# Patient Record
Sex: MALE | Race: WHITE | NOT HISPANIC OR LATINO | Employment: OTHER | RURAL
[De-identification: names, ages, dates, MRNs, and addresses within clinical notes are randomized per-mention and may not be internally consistent; named-entity substitution may affect disease eponyms.]

---

## 2017-05-25 ENCOUNTER — OFFICE VISIT (OUTPATIENT)
Dept: CARDIOLOGY | Facility: CLINIC | Age: 67
End: 2017-05-25

## 2017-05-25 VITALS
HEIGHT: 72 IN | BODY MASS INDEX: 33.51 KG/M2 | SYSTOLIC BLOOD PRESSURE: 120 MMHG | HEART RATE: 78 BPM | DIASTOLIC BLOOD PRESSURE: 60 MMHG | WEIGHT: 247.4 LBS

## 2017-05-25 DIAGNOSIS — I50.22 SYSTOLIC CHF, CHRONIC (HCC): ICD-10-CM

## 2017-05-25 DIAGNOSIS — I10 HTN (HYPERTENSION), BENIGN: ICD-10-CM

## 2017-05-25 DIAGNOSIS — I25.5 ISCHEMIC CARDIOMYOPATHY: Primary | ICD-10-CM

## 2017-05-25 PROCEDURE — 99213 OFFICE O/P EST LOW 20 MIN: CPT | Performed by: INTERNAL MEDICINE

## 2017-05-25 RX ORDER — LISINOPRIL 10 MG/1
10 TABLET ORAL DAILY
COMMUNITY
End: 2017-07-24 | Stop reason: SDUPTHER

## 2017-05-25 RX ORDER — ALBUTEROL SULFATE 2.5 MG/3ML
2.5 SOLUTION RESPIRATORY (INHALATION) DAILY PRN
Refills: 2 | COMMUNITY
Start: 2017-04-05

## 2017-05-25 RX ORDER — ASPIRIN 81 MG/1
81 TABLET ORAL DAILY
COMMUNITY
End: 2017-07-24 | Stop reason: SDUPTHER

## 2017-05-25 RX ORDER — HYDROCODONE BITARTRATE AND ACETAMINOPHEN 7.5; 325 MG/1; MG/1
1 TABLET ORAL DAILY PRN
Refills: 0 | COMMUNITY
Start: 2017-04-26 | End: 2021-07-22

## 2017-05-25 RX ORDER — NITROGLYCERIN 0.4 MG/1
0.4 TABLET SUBLINGUAL
COMMUNITY
End: 2017-07-24 | Stop reason: SDUPTHER

## 2017-07-24 DIAGNOSIS — I25.5 ISCHEMIC CARDIOMYOPATHY: ICD-10-CM

## 2017-07-24 DIAGNOSIS — I10 HTN (HYPERTENSION), BENIGN: ICD-10-CM

## 2017-07-24 DIAGNOSIS — E78.00 HYPERCHOLESTEREMIA: ICD-10-CM

## 2017-07-24 DIAGNOSIS — I50.22 SYSTOLIC CHF, CHRONIC (HCC): ICD-10-CM

## 2017-07-24 RX ORDER — CARVEDILOL 6.25 MG/1
6.25 TABLET ORAL 2 TIMES DAILY WITH MEALS
Qty: 60 TABLET | Refills: 11 | Status: SHIPPED | OUTPATIENT
Start: 2017-07-24 | End: 2018-02-08

## 2017-07-24 RX ORDER — ATORVASTATIN CALCIUM 10 MG/1
10 TABLET, FILM COATED ORAL NIGHTLY
Qty: 30 TABLET | Refills: 11 | Status: SHIPPED | OUTPATIENT
Start: 2017-07-24 | End: 2018-09-06 | Stop reason: SDUPTHER

## 2017-07-24 RX ORDER — ASPIRIN 81 MG/1
81 TABLET ORAL DAILY
Qty: 30 TABLET | Refills: 11 | Status: SHIPPED | OUTPATIENT
Start: 2017-07-24 | End: 2021-05-21

## 2017-07-24 RX ORDER — ISOSORBIDE DINITRATE 40 MG/1
40 TABLET ORAL DAILY
Qty: 30 TABLET | Refills: 11 | Status: SHIPPED | OUTPATIENT
Start: 2017-07-24 | End: 2018-03-22 | Stop reason: CLARIF

## 2017-07-24 RX ORDER — CLOPIDOGREL BISULFATE 75 MG/1
75 TABLET ORAL DAILY
Qty: 30 TABLET | Refills: 11 | Status: SHIPPED | OUTPATIENT
Start: 2017-07-24 | End: 2018-08-23

## 2017-07-24 RX ORDER — NITROGLYCERIN 0.4 MG/1
0.4 TABLET SUBLINGUAL
Qty: 30 TABLET | Refills: 3 | Status: SHIPPED | OUTPATIENT
Start: 2017-07-24 | End: 2020-04-21 | Stop reason: SDUPTHER

## 2017-07-24 RX ORDER — LISINOPRIL 10 MG/1
10 TABLET ORAL DAILY
Qty: 30 TABLET | Refills: 11 | Status: SHIPPED | OUTPATIENT
Start: 2017-07-24 | End: 2018-09-06 | Stop reason: SDUPTHER

## 2017-07-24 RX ORDER — FUROSEMIDE 20 MG/1
20 TABLET ORAL DAILY
Qty: 30 TABLET | Refills: 11 | Status: SHIPPED | OUTPATIENT
Start: 2017-07-24 | End: 2018-09-06 | Stop reason: SDUPTHER

## 2017-08-31 ENCOUNTER — CLINICAL SUPPORT NO REQUIREMENTS (OUTPATIENT)
Dept: CARDIOLOGY | Facility: CLINIC | Age: 67
End: 2017-08-31

## 2017-08-31 DIAGNOSIS — I25.5 ISCHEMIC CARDIOMYOPATHY: ICD-10-CM

## 2017-08-31 PROCEDURE — 93296 REM INTERROG EVL PM/IDS: CPT | Performed by: PHYSICIAN ASSISTANT

## 2017-08-31 PROCEDURE — 93295 DEV INTERROG REMOTE 1/2/MLT: CPT | Performed by: PHYSICIAN ASSISTANT

## 2018-02-08 ENCOUNTER — HOSPITAL ENCOUNTER (OUTPATIENT)
Dept: OTHER | Age: 68
Discharge: HOME | End: 2018-02-08
Attending: INTERNAL MEDICINE | Admitting: INTERNAL MEDICINE

## 2018-02-08 ENCOUNTER — OFFICE VISIT (OUTPATIENT)
Dept: CARDIOLOGY | Facility: CLINIC | Age: 68
End: 2018-02-08

## 2018-02-08 VITALS
HEART RATE: 70 BPM | WEIGHT: 245.9 LBS | HEIGHT: 72 IN | SYSTOLIC BLOOD PRESSURE: 120 MMHG | OXYGEN SATURATION: 95 % | DIASTOLIC BLOOD PRESSURE: 70 MMHG | BODY MASS INDEX: 33.31 KG/M2

## 2018-02-08 DIAGNOSIS — E78.00 HYPERCHOLESTEREMIA: ICD-10-CM

## 2018-02-08 DIAGNOSIS — I25.5 ISCHEMIC CARDIOMYOPATHY: Primary | ICD-10-CM

## 2018-02-08 DIAGNOSIS — I10 HTN (HYPERTENSION), BENIGN: ICD-10-CM

## 2018-02-08 LAB
A/G RATIO: 1.3 (ref 0.8–2)
ALBUMIN SERPL-MCNC: 4.4 G/DL (ref 3.4–4.8)
ALP BLD-CCNC: 102 U/L (ref 25–100)
ALT SERPL-CCNC: 12 U/L (ref 4–36)
ANION GAP SERPL CALCULATED.3IONS-SCNC: 12 MMOL/L (ref 3–16)
AST SERPL-CCNC: 15 U/L (ref 8–33)
BILIRUB SERPL-MCNC: 0.8 MG/DL (ref 0.3–1.2)
BUN BLDV-MCNC: 14 MG/DL (ref 6–20)
CALCIUM SERPL-MCNC: 9.3 MG/DL (ref 8.5–10.5)
CHLORIDE BLD-SCNC: 99 MMOL/L (ref 98–107)
CHOLESTEROL, TOTAL: 134 MG/DL (ref 0–200)
CO2: 26 MMOL/L (ref 20–30)
CREAT SERPL-MCNC: 1.3 MG/DL (ref 0.4–1.2)
GFR AFRICAN AMERICAN: >59
GFR NON-AFRICAN AMERICAN: 55
GLOBULIN: 3.3 G/DL
GLUCOSE BLD-MCNC: 101 MG/DL (ref 74–106)
HDLC SERPL-MCNC: 41 MG/DL (ref 40–60)
LDL CHOLESTEROL CALCULATED: 66 MG/DL
POTASSIUM SERPL-SCNC: 4.6 MMOL/L (ref 3.4–5.1)
SODIUM BLD-SCNC: 137 MMOL/L (ref 136–145)
TOTAL PROTEIN: 7.7 G/DL (ref 6.4–8.3)
TRIGL SERPL-MCNC: 133 MG/DL (ref 0–249)
VLDLC SERPL CALC-MCNC: 27 MG/DL

## 2018-02-08 PROCEDURE — 99214 OFFICE O/P EST MOD 30 MIN: CPT | Performed by: INTERNAL MEDICINE

## 2018-02-08 PROCEDURE — 93284 PRGRMG EVAL IMPLANTABLE DFB: CPT | Performed by: INTERNAL MEDICINE

## 2018-02-08 RX ORDER — MOMETASONE FUROATE AND FORMOTEROL FUMARATE DIHYDRATE 200; 5 UG/1; UG/1
2 AEROSOL RESPIRATORY (INHALATION) DAILY
Refills: 2 | COMMUNITY
Start: 2018-01-30 | End: 2019-10-10

## 2018-02-08 RX ORDER — CARVEDILOL 12.5 MG/1
12.5 TABLET ORAL 2 TIMES DAILY
Qty: 180 TABLET | Refills: 3 | Status: SHIPPED | OUTPATIENT
Start: 2018-02-08 | End: 2018-08-23 | Stop reason: DRUGHIGH

## 2018-02-08 NOTE — PROGRESS NOTES
[facility]  Office visit  Morgan Luna  1950  [phone]    VISIT DATE:  02/08/2018    PCP: Risa Jhaveri MD  [address] [place] [address] [address] [place] [address]    CC:  No chief complaint on file.      PROBLEM LIST:  1. Ischemic cardiomyopathy:  a. History of 4-vessel CABG in 1996, incomplete database.  b. History of post-CABG stenting, 2004, incomplete database.  c. Status post pacemaker defibrillator implantation, 2015, Dr. Fernandez.  2. Black lung disease/chronic obstructive pulmonary disease/ongoing tobacco.  3. Anxiety.  4. Benign hypertension.  5. Hypercholesterolemia.  6. Acid reflux.  7. Family history.  8. Surgical history:  a. Coronary artery bypass grafting.  b. Back surgery.  c. ICD implantation.    ASSESSMENT:   Diagnosis Plan   1. Ischemic cardiomyopathy     2. HTN (hypertension), benign     3. Hypercholesteremia       Device interrogation:  [employer] biventricular ICD  15% atrial pacing, sensed P-wave 3.8 mV, threshold 0.7 V at 0.5 ms, impedance 616 ohms  98% biventricular pacing.  Sensed R wave 13.3 mV, threshold 1 V at 0.5 ms, impedance 497 ohms  Sensed LV 13.7 mV, threshold 2 V at 2 ms, impedance 587 ohms  110 beat run of nonsustained ventricular tachycardia 203 bpm.    PLAN:  Coronary artery disease: Currently stable and asymptomatic.  Has been on dual antiplatelet therapy for 20 years.  Tolerating well without complications.  Discussed that we may discontinue this in the future.  Afterload is well-controlled.  Discussed titrating statin therapy as well.    Congestive heart failure, chronic, systolic: Currently euvolemic and compensated.  On Max tolerated medical therapy.  Continue routine follow-up in device clinic    Hyperlipidemia: Goal LDL less than 70.  Lipid panel pending today, nonfasting.    Nonsustained ventricular tachycardia: Titrating beta blockade.    Subjective  Reports stable functional capacity.  Denies chest pain, palpitations.  Has  stable shortness of breath and a class III pattern due to underlying cold minors pneumoconiosis.  Was able stop smoking about a year and half ago.  Has gained 30 pounds since that time.  He is compliant with medical therapy.  Blood pressures running less than 130/80 mmHg.    PHYSICAL EXAMINATION:  There were no vitals filed for this visit.  General Appearance:    Alert, cooperative, no distress, appears stated age   Head:    Normocephalic, without obvious abnormality, atraumatic   Eyes:    conjunctiva/corneas clear   Nose:   Nares normal, septum midline, mucosa normal, no drainage   Throat:   Lips, teeth and gums normal   Neck:   Supple, symmetrical, trachea midline, no carotid    bruit or JVD   Lungs:     Clear to auscultation bilaterally, respirations unlabored   Chest Wall:    No tenderness or deformity    Heart:    Regular rate and rhythm, S1 and S2 normal, no murmur, rub   or gallop, normal carotid impulse bilaterally without bruit.   Abdomen:     Soft, non-tender   Extremities:   Extremities normal, atraumatic, no cyanosis or edema   Pulses:   2+ and symmetric all extremities   Skin:   Skin color, texture, turgor normal, no rashes or lesions       Diagnostic Data:  Procedures  No results found for: CHLPL, TRIG, HDL, LDLDIRECT  No results found for: GLUCOSE, BUN, CREATININE, NA, K, CL, CO2, CREATININE, BUN  No results found for: HGBA1C  No results found for: WBC, HGB, HCT, PLT    Allergies  Allergies   Allergen Reactions   • Codeine      GI UPSET       Current Medications    Current Outpatient Prescriptions:   •  albuterol (PROVENTIL HFA;VENTOLIN HFA) 108 (90 BASE) MCG/ACT inhaler, Inhale 2 puffs every 4 (four) hours as needed for wheezing., Disp: , Rfl:   •  albuterol (PROVENTIL) (2.5 MG/3ML) 0.083% nebulizer solution, Daily As Needed., Disp: , Rfl: 2  •  aspirin 81 MG EC tablet, Take 1 tablet by mouth Daily., Disp: 30 tablet, Rfl: 11  •  atorvastatin (LIPITOR) 10 MG tablet, Take 1 tablet by mouth Every  Night., Disp: 30 tablet, Rfl: 11  •  carvedilol (COREG) 6.25 MG tablet, Take 1 tablet by mouth 2 (Two) Times a Day With Meals., Disp: 60 tablet, Rfl: 11  •  clopidogrel (PLAVIX) 75 MG tablet, Take 1 tablet by mouth Daily., Disp: 30 tablet, Rfl: 11  •  diazepam (VALIUM) 5 MG tablet, Take 5 mg by mouth Daily., Disp: , Rfl:   •  furosemide (LASIX) 20 MG tablet, Take 1 tablet by mouth Daily., Disp: 30 tablet, Rfl: 11  •  HYDROcodone-acetaminophen (NORCO) 7.5-325 MG per tablet, Daily As Needed., Disp: , Rfl: 0  •  isosorbide dinitrate (ISORDIL) 40 MG tablet, Take 1 tablet by mouth Daily., Disp: 30 tablet, Rfl: 11  •  lisinopril (PRINIVIL,ZESTRIL) 10 MG tablet, Take 1 tablet by mouth Daily., Disp: 30 tablet, Rfl: 11  •  Mometasone Furo-Formoterol Fum (DULERA IN), Inhale as needed., Disp: , Rfl:   •  nitroglycerin (NITROSTAT) 0.4 MG SL tablet, Place 1 tablet under the tongue Every 5 (Five) Minutes As Needed for Chest Pain. Take no more than 3 doses in 15 minutes., Disp: 30 tablet, Rfl: 3  •  O2 (OXYGEN), Inhale 2.5 L/min Daily. Pt uses oxygen at home only, Disp: , Rfl:   •  ranitidine (ZANTAC) 150 MG tablet, Take 150 mg by mouth 2 (two) times a day., Disp: , Rfl:   •  tamsulosin (FLOMAX) 0.4 MG capsule 24 hr capsule, Take 1 capsule by mouth Every Night., Disp: , Rfl:   •  tiotropium (SPIRIVA) 18 MCG per inhalation capsule, Place 1 capsule into inhaler and inhale 1 (one) time daily., Disp: , Rfl:           ROS  Review of Systems   Cardiovascular: Positive for irregular heartbeat and orthopnea.   Respiratory: Positive for shortness of breath, sleep disturbances due to breathing and snoring.    Neurological: Positive for dizziness.       SOCIAL HX  Social History     Social History   • Marital status:      Spouse name: N/A   • Number of children: N/A   • Years of education: N/A     Occupational History   • Not on file.     Social History Main Topics   • Smoking status: Former Smoker     Packs/day: 1.00     Quit date:  11/2016   • Smokeless tobacco: Never Used   • Alcohol use No   • Drug use: No   • Sexual activity: Defer     Other Topics Concern   • Not on file     Social History Narrative       FAMILY HX  No family history on file.          Vinayak Felix III, MD, FACC

## 2018-03-22 RX ORDER — ISOSORBIDE DINITRATE 20 MG/1
TABLET ORAL
Qty: 60 TABLET | Refills: 5 | Status: SHIPPED | OUTPATIENT
Start: 2018-03-22 | End: 2018-09-06 | Stop reason: SDUPTHER

## 2018-03-22 NOTE — TELEPHONE ENCOUNTER
Pharmacy called stating that isosorbide 40 mg will not be made anymore. Had to change to 20mg 2 daily

## 2018-05-10 ENCOUNTER — CLINICAL SUPPORT NO REQUIREMENTS (OUTPATIENT)
Dept: CARDIOLOGY | Facility: CLINIC | Age: 68
End: 2018-05-10

## 2018-05-10 DIAGNOSIS — I25.5 ISCHEMIC CARDIOMYOPATHY: ICD-10-CM

## 2018-05-10 PROCEDURE — 93296 REM INTERROG EVL PM/IDS: CPT | Performed by: INTERNAL MEDICINE

## 2018-05-10 PROCEDURE — 93295 DEV INTERROG REMOTE 1/2/MLT: CPT | Performed by: INTERNAL MEDICINE

## 2018-08-23 ENCOUNTER — OFFICE VISIT (OUTPATIENT)
Dept: CARDIOLOGY | Facility: CLINIC | Age: 68
End: 2018-08-23

## 2018-08-23 VITALS — HEIGHT: 72 IN | BODY MASS INDEX: 33.46 KG/M2 | HEART RATE: 71 BPM | WEIGHT: 247 LBS

## 2018-08-23 DIAGNOSIS — E78.00 HYPERCHOLESTEREMIA: ICD-10-CM

## 2018-08-23 DIAGNOSIS — I25.10 CORONARY ARTERY DISEASE INVOLVING NATIVE CORONARY ARTERY OF NATIVE HEART WITHOUT ANGINA PECTORIS: ICD-10-CM

## 2018-08-23 DIAGNOSIS — I10 HTN (HYPERTENSION), BENIGN: ICD-10-CM

## 2018-08-23 DIAGNOSIS — I50.22 SYSTOLIC CHF, CHRONIC (HCC): Primary | ICD-10-CM

## 2018-08-23 PROCEDURE — 93284 PRGRMG EVAL IMPLANTABLE DFB: CPT | Performed by: INTERNAL MEDICINE

## 2018-08-23 PROCEDURE — 99214 OFFICE O/P EST MOD 30 MIN: CPT | Performed by: INTERNAL MEDICINE

## 2018-08-23 RX ORDER — CARVEDILOL 6.25 MG/1
6.25 TABLET ORAL 2 TIMES DAILY WITH MEALS
COMMUNITY
End: 2018-09-06 | Stop reason: SDUPTHER

## 2018-08-23 NOTE — PROGRESS NOTES
[facility]  Office visit  Morgan Luna  1950  [phone]    VISIT DATE:  02/08/2018    PCP: Risa Morales MD  [address] [place] [address] [address] [place] [address]    CC:  Chief Complaint   Patient presents with   • Cardiomyopathy   • Shortness of Breath       PROBLEM LIST:  1. Ischemic cardiomyopathy:  a. History of 4-vessel CABG in 1996, incomplete database.  b. History of post-CABG stenting, 2004, incomplete database.  c. Status post pacemaker defibrillator implantation, 2015, Dr. Fernandez.  2. Black lung disease/chronic obstructive pulmonary disease/ongoing tobacco.  3. Anxiety.  4. Benign hypertension.  5. Hypercholesterolemia.  6. Acid reflux.  7. Family history.  8. Surgical history:  a. Coronary artery bypass grafting.  b. Back surgery.  c. ICD implantation.    ASSESSMENT:   Diagnosis Plan   1. Systolic CHF, chronic (CMS/HCC)     2. Hypercholesteremia     3. HTN (hypertension), benign     4. Coronary artery disease involving native coronary artery of native heart without angina pectoris       Device interrogation:  [employer] biventricular ICD  11% atrial pacing, sensed P-wave 3.4 mV, threshold 0.8V at 0.5 ms, impedance 593 ohms  99 % biventricular pacing.  Sensed R wave 11 mV, threshold 0.9 V at 0.5 ms, impedance 485 ohms  Sensed LV 13.1 mV, threshold 2 V at 2 ms, impedance 570 ohms  No significant arrhythmia  Estimated battery life 5 years    PLAN:  Coronary artery disease: Currently stable and asymptomatic.  Discontinuing dual and a platelet therapy, stopped clopidogrel.  Afterload is well-controlled.  Continue statin    Congestive heart failure, chronic, systolic: Currently euvolemic and compensated.  On Max tolerated medical therapy.  Continue routine follow-up in device clinic    Hyperlipidemia: Goal LDL less than 70.  Currently well controlled. Continue current medical therapy    Nonsustained ventricular tachycardia: Continue  "beta-blockade    Subjective  Reports stable functional capacity.  Denies chest pain, palpitations.  Has stable shortness of breath and a class III pattern due to underlying 's pneumoconiosis.  Was able stop smoking.  He is compliant with medical therapy.  Blood pressures running less than 130/80 mmHg.    PHYSICAL EXAMINATION:  Vitals:    08/23/18 1104   Pulse: 71   Weight: 112 kg (247 lb)   Height: 182.9 cm (72\")     General Appearance:    Alert, cooperative, no distress, appears stated age   Head:    Normocephalic, without obvious abnormality, atraumatic   Eyes:    conjunctiva/corneas clear   Nose:   Nares normal, septum midline, mucosa normal, no drainage   Throat:   Lips, teeth and gums normal   Neck:   Supple, symmetrical, trachea midline, no carotid    bruit or JVD   Lungs:     Clear to auscultation bilaterally, respirations unlabored   Chest Wall:    No tenderness or deformity    Heart:    Regular rate and rhythm, S1 and S2 normal, no murmur, rub   or gallop, normal carotid impulse bilaterally without bruit.   Abdomen:     Soft, non-tender   Extremities:   Extremities normal, atraumatic, no cyanosis or edema   Pulses:   2+ and symmetric all extremities   Skin:   Skin color, texture, turgor normal, no rashes or lesions       Diagnostic Data:  Procedures  No results found for: CHLPL, TRIG, HDL, LDLDIRECT  No results found for: GLUCOSE, BUN, CREATININE, NA, K, CL, CO2, CREATININE, BUN  No results found for: HGBA1C  No results found for: WBC, HGB, HCT, PLT    Allergies  Allergies   Allergen Reactions   • Codeine      GI UPSET       Current Medications    Current Outpatient Prescriptions:   •  albuterol (PROVENTIL HFA;VENTOLIN HFA) 108 (90 BASE) MCG/ACT inhaler, Inhale 2 puffs every 4 (four) hours as needed for wheezing., Disp: , Rfl:   •  albuterol (PROVENTIL) (2.5 MG/3ML) 0.083% nebulizer solution, Daily As Needed., Disp: , Rfl: 2  •  ANORO ELLIPTA 62.5-25 MCG/INH aerosol powder  inhaler, Inhale 1 puff " Daily., Disp: , Rfl: 0  •  aspirin 81 MG EC tablet, Take 1 tablet by mouth Daily., Disp: 30 tablet, Rfl: 11  •  atorvastatin (LIPITOR) 10 MG tablet, Take 1 tablet by mouth Every Night., Disp: 30 tablet, Rfl: 11  •  carvedilol (COREG) 6.25 MG tablet, Take 6.25 mg by mouth 2 (Two) Times a Day With Meals., Disp: , Rfl:   •  clopidogrel (PLAVIX) 75 MG tablet, Take 1 tablet by mouth Daily., Disp: 30 tablet, Rfl: 11  •  diazepam (VALIUM) 5 MG tablet, Take 5 mg by mouth Daily., Disp: , Rfl:   •  DULERA 200-5 MCG/ACT inhaler, Inhale 2 puffs Daily., Disp: , Rfl: 2  •  furosemide (LASIX) 20 MG tablet, Take 1 tablet by mouth Daily., Disp: 30 tablet, Rfl: 11  •  HYDROcodone-acetaminophen (NORCO) 7.5-325 MG per tablet, Take 1 tablet by mouth Daily As Needed., Disp: , Rfl: 0  •  isosorbide dinitrate (ISORDIL) 20 MG tablet, TAKE 2 TABS DAILY, Disp: 60 tablet, Rfl: 5  •  lisinopril (PRINIVIL,ZESTRIL) 10 MG tablet, Take 1 tablet by mouth Daily., Disp: 30 tablet, Rfl: 11  •  nitroglycerin (NITROSTAT) 0.4 MG SL tablet, Place 1 tablet under the tongue Every 5 (Five) Minutes As Needed for Chest Pain. Take no more than 3 doses in 15 minutes., Disp: 30 tablet, Rfl: 3  •  O2 (OXYGEN), Inhale 2.5 L/min Daily. Pt uses oxygen at home only, Disp: , Rfl:   •  ranitidine (ZANTAC) 150 MG tablet, Take 150 mg by mouth 2 (two) times a day., Disp: , Rfl:   •  tamsulosin (FLOMAX) 0.4 MG capsule 24 hr capsule, Take 1 capsule by mouth Every Night., Disp: , Rfl:           ROS  Review of Systems   Cardiovascular: Positive for dyspnea on exertion and orthopnea. Negative for irregular heartbeat.   Respiratory: Positive for shortness of breath, sleep disturbances due to breathing, snoring, sputum production and wheezing.    Neurological: Positive for dizziness.       SOCIAL HX  Social History     Social History   • Marital status:      Spouse name: N/A   • Number of children: N/A   • Years of education: N/A     Occupational History   • Not on file.      Social History Main Topics   • Smoking status: Former Smoker     Packs/day: 1.00     Quit date: 11/2016   • Smokeless tobacco: Never Used   • Alcohol use No   • Drug use: No   • Sexual activity: Defer     Other Topics Concern   • Not on file     Social History Narrative   • No narrative on file       FAMILY HX  No family history on file.          Vinayak Felix III, MD, FACC

## 2018-09-06 DIAGNOSIS — I10 HTN (HYPERTENSION), BENIGN: ICD-10-CM

## 2018-09-06 DIAGNOSIS — E78.00 HYPERCHOLESTEREMIA: ICD-10-CM

## 2018-09-06 DIAGNOSIS — I25.5 ISCHEMIC CARDIOMYOPATHY: ICD-10-CM

## 2018-09-06 DIAGNOSIS — I50.22 SYSTOLIC CHF, CHRONIC (HCC): ICD-10-CM

## 2018-09-06 RX ORDER — FUROSEMIDE 20 MG/1
20 TABLET ORAL DAILY
Qty: 30 TABLET | Refills: 5 | Status: SHIPPED | OUTPATIENT
Start: 2018-09-06 | End: 2019-03-19 | Stop reason: SDUPTHER

## 2018-09-06 RX ORDER — ATORVASTATIN CALCIUM 10 MG/1
10 TABLET, FILM COATED ORAL NIGHTLY
Qty: 30 TABLET | Refills: 5 | Status: SHIPPED | OUTPATIENT
Start: 2018-09-06 | End: 2019-03-19 | Stop reason: SDUPTHER

## 2018-09-06 RX ORDER — CARVEDILOL 6.25 MG/1
6.25 TABLET ORAL 2 TIMES DAILY WITH MEALS
Qty: 60 TABLET | Refills: 5 | Status: SHIPPED | OUTPATIENT
Start: 2018-09-06 | End: 2019-03-19 | Stop reason: SDUPTHER

## 2018-09-06 RX ORDER — ISOSORBIDE DINITRATE 20 MG/1
TABLET ORAL
Qty: 60 TABLET | Refills: 5 | Status: SHIPPED | OUTPATIENT
Start: 2018-09-06 | End: 2019-03-19 | Stop reason: SDUPTHER

## 2018-09-06 RX ORDER — LISINOPRIL 10 MG/1
10 TABLET ORAL DAILY
Qty: 30 TABLET | Refills: 5 | Status: SHIPPED | OUTPATIENT
Start: 2018-09-06 | End: 2019-03-19 | Stop reason: SDUPTHER

## 2018-10-25 ENCOUNTER — CLINICAL SUPPORT NO REQUIREMENTS (OUTPATIENT)
Dept: CARDIOLOGY | Facility: CLINIC | Age: 68
End: 2018-10-25

## 2018-10-25 DIAGNOSIS — I25.5 ISCHEMIC CARDIOMYOPATHY: ICD-10-CM

## 2018-10-25 PROCEDURE — 93295 DEV INTERROG REMOTE 1/2/MLT: CPT | Performed by: INTERNAL MEDICINE

## 2018-10-25 PROCEDURE — 93296 REM INTERROG EVL PM/IDS: CPT | Performed by: INTERNAL MEDICINE

## 2019-01-24 ENCOUNTER — CLINICAL SUPPORT NO REQUIREMENTS (OUTPATIENT)
Dept: CARDIOLOGY | Facility: CLINIC | Age: 69
End: 2019-01-24

## 2019-01-24 DIAGNOSIS — I25.5 ISCHEMIC CARDIOMYOPATHY: ICD-10-CM

## 2019-01-24 PROCEDURE — 93296 REM INTERROG EVL PM/IDS: CPT | Performed by: INTERNAL MEDICINE

## 2019-01-24 PROCEDURE — 93295 DEV INTERROG REMOTE 1/2/MLT: CPT | Performed by: INTERNAL MEDICINE

## 2019-03-19 ENCOUNTER — OFFICE VISIT (OUTPATIENT)
Dept: CARDIOLOGY | Facility: CLINIC | Age: 69
End: 2019-03-19

## 2019-03-19 VITALS
HEIGHT: 72 IN | OXYGEN SATURATION: 94 % | HEART RATE: 62 BPM | DIASTOLIC BLOOD PRESSURE: 70 MMHG | WEIGHT: 250.8 LBS | BODY MASS INDEX: 33.97 KG/M2 | SYSTOLIC BLOOD PRESSURE: 116 MMHG

## 2019-03-19 DIAGNOSIS — I10 HTN (HYPERTENSION), BENIGN: ICD-10-CM

## 2019-03-19 DIAGNOSIS — E78.00 HYPERCHOLESTEREMIA: ICD-10-CM

## 2019-03-19 DIAGNOSIS — I50.22 SYSTOLIC CHF, CHRONIC (HCC): Primary | ICD-10-CM

## 2019-03-19 DIAGNOSIS — I25.10 CORONARY ARTERY DISEASE INVOLVING NATIVE CORONARY ARTERY OF NATIVE HEART WITHOUT ANGINA PECTORIS: ICD-10-CM

## 2019-03-19 DIAGNOSIS — I25.5 ISCHEMIC CARDIOMYOPATHY: ICD-10-CM

## 2019-03-19 PROCEDURE — 99214 OFFICE O/P EST MOD 30 MIN: CPT | Performed by: INTERNAL MEDICINE

## 2019-03-19 RX ORDER — CARVEDILOL 6.25 MG/1
6.25 TABLET ORAL 2 TIMES DAILY WITH MEALS
Qty: 180 TABLET | Refills: 1 | Status: SHIPPED | OUTPATIENT
Start: 2019-03-19 | End: 2019-10-10

## 2019-03-19 RX ORDER — AMOXICILLIN AND CLAVULANATE POTASSIUM 875; 125 MG/1; MG/1
TABLET, FILM COATED ORAL
Refills: 0 | COMMUNITY
Start: 2019-03-14 | End: 2019-10-10

## 2019-03-19 RX ORDER — ISOSORBIDE DINITRATE 20 MG/1
TABLET ORAL
Qty: 180 TABLET | Refills: 1 | Status: SHIPPED | OUTPATIENT
Start: 2019-03-19 | End: 2019-11-04 | Stop reason: SDUPTHER

## 2019-03-19 RX ORDER — LISINOPRIL 10 MG/1
10 TABLET ORAL DAILY
Qty: 90 TABLET | Refills: 1 | Status: SHIPPED | OUTPATIENT
Start: 2019-03-19 | End: 2019-10-10

## 2019-03-19 RX ORDER — FUROSEMIDE 20 MG/1
20 TABLET ORAL DAILY
Qty: 90 TABLET | Refills: 1 | Status: SHIPPED | OUTPATIENT
Start: 2019-03-19 | End: 2020-04-21 | Stop reason: SDUPTHER

## 2019-03-19 RX ORDER — ATORVASTATIN CALCIUM 10 MG/1
10 TABLET, FILM COATED ORAL NIGHTLY
Qty: 90 TABLET | Refills: 1 | Status: SHIPPED | OUTPATIENT
Start: 2019-03-19 | End: 2019-11-04 | Stop reason: SDUPTHER

## 2019-03-19 NOTE — PROGRESS NOTES
[facility]  Office visit  Morgan Luna  1950  [phone]    VISIT DATE:  02/08/2018    PCP: Nini Jhaveri, Risa Staton MD  [address] [place] [address] [address] [place] [address]    CC:  Chief Complaint   Patient presents with   • Coronary Artery Disease   • Systolic CHF, chronic       PROBLEM LIST:  1. Ischemic cardiomyopathy:  a. History of 4-vessel CABG in 1996, incomplete database.  b. History of post-CABG stenting, 2004, incomplete database.  c. Status post pacemaker defibrillator implantation, 2015, Dr. Fernandez.  2. Black lung disease/chronic obstructive pulmonary disease/ongoing tobacco.  3. Anxiety.  4. Benign hypertension.  5. Hypercholesterolemia.  6. Acid reflux.  7. Family history.  8. Surgical history:  a. Coronary artery bypass grafting.  b. Back surgery.  c. ICD implantation.    ASSESSMENT:   Diagnosis Plan   1. Systolic CHF, chronic (CMS/HCC)     2. Coronary artery disease involving native coronary artery of native heart without angina pectoris     3. HTN (hypertension), benign     4. Hypercholesteremia       Device interrogation:  [employer] biventricular ICD  9 % atrial pacing, sensed P-wave 2.7 mV, threshold 0.8V at 0.5 ms, impedance 616 ohms  99 % biventricular pacing.  Sensed R wave 14.1 mV, threshold 0.9 V at 0.5 ms, impedance 490 ohms  Sensed LV 12.2 mV, threshold 1.8 V at 2 ms, impedance 605 ohms  2 mode switches less than 1 second in duration, 2 nonsustained ventricular tachycardia episodes, 3-8 beats in duration.  Estimated battery life 4.5 years    PLAN:  Coronary artery disease: Currently stable and asymptomatic.  Afterload is well-controlled.  Continue statin and low-dose aspirin    Congestive heart failure, chronic, systolic: Currently euvolemic and compensated.  On Max tolerated medical therapy.  Continue routine follow-up in device clinic.  Echo pending in setting of recent mild congestive heart failure exacerbation.    Hyperlipidemia: Goal LDL less than  70.  Currently well controlled. Continue current medical therapy    Nonsustained ventricular tachycardia: Continue beta-blockade    Subjective  Recent episode of worsening dyspnea and fluid retention after upper respiratory tract infection requiring a brief course of increase Lasix.  Was noted to have a change in baseline renal function, previous creatinine 1.3-1.5, most recently 1.9.  Has follow-up with nephrology..  Denies chest pain, palpitations.  Has stable shortness of breath and a class III pattern due to underlying 's pneumoconiosis.  Was able stop smoking.  He is compliant with medical therapy.  Compliant with supplemental oxygen as well.  Blood pressures running less than 130/80 mmHg.    PHYSICAL EXAMINATION:  There were no vitals filed for this visit.  General Appearance:    Alert, cooperative, no distress, appears stated age   Head:    Normocephalic, without obvious abnormality, atraumatic   Eyes:    conjunctiva/corneas clear   Nose:   Nares normal, septum midline, mucosa normal, no drainage   Throat:   Lips, teeth and gums normal   Neck:   Supple, symmetrical, trachea midline, no carotid    bruit or JVD   Lungs:     Clear to auscultation bilaterally, respirations unlabored   Chest Wall:    No tenderness or deformity    Heart:    Regular rate and rhythm, S1 and S2 normal, no murmur, rub   or gallop, normal carotid impulse bilaterally without bruit.   Abdomen:     Soft, non-tender   Extremities:   Extremities normal, atraumatic, no cyanosis or edema   Pulses:   2+ and symmetric all extremities   Skin:   Skin color, texture, turgor normal, no rashes or lesions       Diagnostic Data:  Procedures  No results found for: CHLPL, TRIG, HDL, LDLDIRECT  No results found for: GLUCOSE, BUN, CREATININE, NA, K, CL, CO2, CREATININE, BUN  No results found for: HGBA1C  No results found for: WBC, HGB, HCT, PLT    Allergies  Allergies   Allergen Reactions   • Codeine      GI UPSET       Current  Medications    Current Outpatient Medications:   •  albuterol (PROVENTIL HFA;VENTOLIN HFA) 108 (90 BASE) MCG/ACT inhaler, Inhale 2 puffs every 4 (four) hours as needed for wheezing., Disp: , Rfl:   •  albuterol (PROVENTIL) (2.5 MG/3ML) 0.083% nebulizer solution, Daily As Needed., Disp: , Rfl: 2  •  ANORO ELLIPTA 62.5-25 MCG/INH aerosol powder  inhaler, Inhale 1 puff Daily., Disp: , Rfl: 0  •  aspirin 81 MG EC tablet, Take 1 tablet by mouth Daily., Disp: 30 tablet, Rfl: 11  •  atorvastatin (LIPITOR) 10 MG tablet, Take 1 tablet by mouth Every Night., Disp: 30 tablet, Rfl: 5  •  carvedilol (COREG) 6.25 MG tablet, Take 1 tablet by mouth 2 (Two) Times a Day With Meals., Disp: 60 tablet, Rfl: 5  •  diazepam (VALIUM) 5 MG tablet, Take 5 mg by mouth Daily., Disp: , Rfl:   •  DULERA 200-5 MCG/ACT inhaler, Inhale 2 puffs Daily., Disp: , Rfl: 2  •  furosemide (LASIX) 20 MG tablet, Take 1 tablet by mouth Daily., Disp: 30 tablet, Rfl: 5  •  HYDROcodone-acetaminophen (NORCO) 7.5-325 MG per tablet, Take 1 tablet by mouth Daily As Needed., Disp: , Rfl: 0  •  isosorbide dinitrate (ISORDIL) 20 MG tablet, TAKE 2 TABS DAILY, Disp: 60 tablet, Rfl: 5  •  lisinopril (PRINIVIL,ZESTRIL) 10 MG tablet, Take 1 tablet by mouth Daily., Disp: 30 tablet, Rfl: 5  •  nitroglycerin (NITROSTAT) 0.4 MG SL tablet, Place 1 tablet under the tongue Every 5 (Five) Minutes As Needed for Chest Pain. Take no more than 3 doses in 15 minutes., Disp: 30 tablet, Rfl: 3  •  O2 (OXYGEN), Inhale 2.5 L/min Daily. Pt uses oxygen at home only, Disp: , Rfl:   •  ranitidine (ZANTAC) 150 MG tablet, Take 150 mg by mouth 2 (two) times a day., Disp: , Rfl:   •  tamsulosin (FLOMAX) 0.4 MG capsule 24 hr capsule, Take 1 capsule by mouth Every Night., Disp: , Rfl:           ROS  Review of Systems   Cardiovascular: Positive for orthopnea. Negative for irregular heartbeat.   Respiratory: Positive for shortness of breath, sleep disturbances due to breathing, snoring and sputum  production. Negative for wheezing.    Neurological: Positive for dizziness.       SOCIAL HX  Social History     Socioeconomic History   • Marital status:      Spouse name: Not on file   • Number of children: Not on file   • Years of education: Not on file   • Highest education level: Not on file   Social Needs   • Financial resource strain: Not on file   • Food insecurity - worry: Not on file   • Food insecurity - inability: Not on file   • Transportation needs - medical: Not on file   • Transportation needs - non-medical: Not on file   Occupational History   • Not on file   Tobacco Use   • Smoking status: Former Smoker     Packs/day: 1.00     Last attempt to quit: 2016     Years since quittin.3   • Smokeless tobacco: Never Used   Substance and Sexual Activity   • Alcohol use: No   • Drug use: No   • Sexual activity: Defer   Other Topics Concern   • Not on file   Social History Narrative   • Not on file       FAMILY HX  No family history on file.          Vinayak Felix III, MD, FACC

## 2019-03-25 ENCOUNTER — HOSPITAL ENCOUNTER (OUTPATIENT)
Dept: NON INVASIVE DIAGNOSTICS | Facility: HOSPITAL | Age: 69
Discharge: HOME | End: 2019-03-25
Payer: MEDICARE

## 2019-03-25 ENCOUNTER — OUTSIDE FACILITY SERVICE (OUTPATIENT)
Dept: CARDIOLOGY | Facility: CLINIC | Age: 69
End: 2019-03-25

## 2019-03-25 PROCEDURE — 93306 TTE W/DOPPLER COMPLETE: CPT | Performed by: INTERNAL MEDICINE

## 2019-03-25 PROCEDURE — 93306 TTE W/DOPPLER COMPLETE: CPT

## 2019-04-25 ENCOUNTER — CLINICAL SUPPORT NO REQUIREMENTS (OUTPATIENT)
Dept: CARDIOLOGY | Facility: CLINIC | Age: 69
End: 2019-04-25

## 2019-04-25 DIAGNOSIS — I25.5 ISCHEMIC CARDIOMYOPATHY: ICD-10-CM

## 2019-04-25 PROCEDURE — 93295 DEV INTERROG REMOTE 1/2/MLT: CPT | Performed by: INTERNAL MEDICINE

## 2019-04-25 PROCEDURE — 93296 REM INTERROG EVL PM/IDS: CPT | Performed by: INTERNAL MEDICINE

## 2019-07-25 ENCOUNTER — CLINICAL SUPPORT NO REQUIREMENTS (OUTPATIENT)
Dept: CARDIOLOGY | Facility: CLINIC | Age: 69
End: 2019-07-25

## 2019-07-25 DIAGNOSIS — I50.22 SYSTOLIC CHF, CHRONIC (HCC): ICD-10-CM

## 2019-07-25 DIAGNOSIS — I25.5 ISCHEMIC CARDIOMYOPATHY: Primary | ICD-10-CM

## 2019-07-25 PROCEDURE — 93295 DEV INTERROG REMOTE 1/2/MLT: CPT | Performed by: INTERNAL MEDICINE

## 2019-07-25 PROCEDURE — 93296 REM INTERROG EVL PM/IDS: CPT | Performed by: INTERNAL MEDICINE

## 2019-10-10 ENCOUNTER — OFFICE VISIT (OUTPATIENT)
Dept: CARDIOLOGY | Facility: CLINIC | Age: 69
End: 2019-10-10

## 2019-10-10 VITALS
OXYGEN SATURATION: 96 % | HEART RATE: 76 BPM | SYSTOLIC BLOOD PRESSURE: 118 MMHG | HEIGHT: 72 IN | WEIGHT: 248 LBS | BODY MASS INDEX: 33.59 KG/M2 | DIASTOLIC BLOOD PRESSURE: 64 MMHG

## 2019-10-10 DIAGNOSIS — I25.5 ISCHEMIC CARDIOMYOPATHY: Primary | ICD-10-CM

## 2019-10-10 DIAGNOSIS — E78.00 HYPERCHOLESTEREMIA: ICD-10-CM

## 2019-10-10 DIAGNOSIS — I10 HTN (HYPERTENSION), BENIGN: ICD-10-CM

## 2019-10-10 PROCEDURE — 99214 OFFICE O/P EST MOD 30 MIN: CPT | Performed by: INTERNAL MEDICINE

## 2019-10-10 PROCEDURE — 93284 PRGRMG EVAL IMPLANTABLE DFB: CPT | Performed by: INTERNAL MEDICINE

## 2019-10-10 RX ORDER — METOPROLOL SUCCINATE 25 MG/1
25 TABLET, EXTENDED RELEASE ORAL DAILY
Qty: 30 TABLET | Refills: 11 | Status: SHIPPED | OUTPATIENT
Start: 2019-10-10 | End: 2020-04-21 | Stop reason: SDUPTHER

## 2019-10-10 NOTE — PROGRESS NOTES
[facility]  Office visit  Morgan Luna  1950  [phone]    VISIT DATE:  10/10/2019      PCP: Nini Jhaveri, Risa Staton MD  [address] [place] [address] [address] [place] [address]    CC:  Chief Complaint   Patient presents with   • systolic CHF chronic   • Cardiomyopathy       PROBLEM LIST:  1. Ischemic cardiomyopathy:  a. History of 4-vessel CABG in 1996, incomplete database.  b. History of post-CABG stenting, 2004, incomplete database.  c. Status post pacemaker defibrillator implantation, 2015, Dr. Fernandez.  2. Black lung disease/chronic obstructive pulmonary disease/ongoing tobacco.  3. Anxiety.  4. Benign hypertension.  5. Hypercholesterolemia.  6. Acid reflux.  7. Family history.  8. Surgical history:  a. Coronary artery bypass grafting.  b. Back surgery.  c. ICD implantation.    March 2019 echo   Endocardial definition too poor to make an accurate estimation of LVEF,   would require IV echocontrast or alternative imaging modality.   Moderately increased left ventricular cavity size.   There is mild concentric left ventricular hypertrophy.   Diastolic filling parameters suggests grade I diastolic dysfunction .    ASSESSMENT:   Diagnosis Plan   1. Ischemic cardiomyopathy     2. HTN (hypertension), benign     3. Hypercholesteremia       Device interrogation:  [place] [employer] biventricular ICD  15 % atrial pacing, sensed P-wave 5.2 mV, threshold 0.6V at 0.5 ms, impedance 624 ohms  99 % biventricular pacing.  Sensed R wave 18.2 mV, threshold 0.9 V at 0.5 ms, impedance 515 ohms  Sensed LV 15 mV, threshold 1.8 V at 2 ms, impedance 630 ohms  2 episodes of nonsustained ventricular tachycardia, less than 8 seconds., did not require antitachycardia pacing.  One episode of transient rate controlled atrial flutter.    Estimated battery life 4 years    PLAN:  Coronary artery disease: Currently stable and asymptomatic.  Afterload is well-controlled.  Continue statin and low-dose aspirin    Congestive  "heart failure, chronic, systolic: Currently euvolemic and compensated.  On Max tolerated medical therapy.  Continue routine follow-up in device clinic.      Hyperlipidemia: Goal LDL less than 70.  Currently well controlled. Continue current medical therapy    Nonsustained ventricular tachycardia: Continue beta-blockade, transitioning to metoprolol succinate 25 mg p.o. Daily.    Paroxysmal atrial flutter: Minimal burden of arrhythmia, not recommending anticoagulation or antiarrhythmic therapy at this time.  Currently asymptomatic.    Subjective  Most of his limitations are related to underlying black lung, currently on 3 L nasal cannula oxygen.  Following with nephrology routinely, recent medication changes made by nephrology include switching carvedilol to metoprolol tartrate, and discontinuation of Imdur and lisinopril in order to improve renal perfusion.  He is compliant with medical therapy.  Compliant with supplemental oxygen as well.  Blood pressures running less than 130/80 mmHg.    PHYSICAL EXAMINATION:  Vitals:    10/10/19 1020   BP: 118/64   BP Location: Left arm   Patient Position: Sitting   Pulse: 76   SpO2: 96%   Weight: 112 kg (248 lb)   Height: 182.9 cm (72\")     General Appearance:    Alert, cooperative, no distress, appears stated age   Head:    Normocephalic, without obvious abnormality, atraumatic   Eyes:    conjunctiva/corneas clear   Nose:   Nares normal, septum midline, mucosa normal, no drainage   Throat:   Lips, teeth and gums normal   Neck:   Supple, symmetrical, trachea midline, no carotid    bruit or JVD   Lungs:     Clear to auscultation bilaterally, respirations unlabored   Chest Wall:    No tenderness or deformity    Heart:    Regular rate and rhythm, S1 and S2 normal, no murmur, rub   or gallop, normal carotid impulse bilaterally without bruit.   Abdomen:     Soft, non-tender   Extremities:   Extremities normal, atraumatic, no cyanosis or edema   Pulses:   2+ and symmetric all " extremities   Skin:   Skin color, texture, turgor normal, no rashes or lesions       Diagnostic Data:  Procedures  No results found for: CHLPL, TRIG, HDL, LDLDIRECT  No results found for: GLUCOSE, BUN, CREATININE, NA, K, CL, CO2, CREATININE, BUN  No results found for: HGBA1C  No results found for: WBC, HGB, HCT, PLT    Allergies  Allergies   Allergen Reactions   • Codeine      GI UPSET       Current Medications    Current Outpatient Medications:   •  albuterol (PROVENTIL HFA;VENTOLIN HFA) 108 (90 BASE) MCG/ACT inhaler, Inhale 2 puffs every 4 (four) hours as needed for wheezing., Disp: , Rfl:   •  albuterol (PROVENTIL) (2.5 MG/3ML) 0.083% nebulizer solution, Take 2.5 mg by nebulization Daily As Needed., Disp: , Rfl: 2  •  ANORO ELLIPTA 62.5-25 MCG/INH aerosol powder  inhaler, Inhale 1 puff Daily., Disp: , Rfl: 0  •  aspirin 81 MG EC tablet, Take 1 tablet by mouth Daily., Disp: 30 tablet, Rfl: 11  •  atorvastatin (LIPITOR) 10 MG tablet, Take 1 tablet by mouth Every Night., Disp: 90 tablet, Rfl: 1  •  diazepam (VALIUM) 5 MG tablet, Take 5 mg by mouth Daily., Disp: , Rfl:   •  furosemide (LASIX) 20 MG tablet, Take 1 tablet by mouth Daily., Disp: 90 tablet, Rfl: 1  •  HYDROcodone-acetaminophen (NORCO) 7.5-325 MG per tablet, Take 1 tablet by mouth Daily As Needed., Disp: , Rfl: 0  •  isosorbide dinitrate (ISORDIL) 20 MG tablet, TAKE 2 TABS DAILY, Disp: 180 tablet, Rfl: 1  •  metoprolol tartrate (LOPRESSOR) 25 MG tablet, Take 25 mg by mouth 2 (Two) Times a Day., Disp: , Rfl:   •  nitroglycerin (NITROSTAT) 0.4 MG SL tablet, Place 1 tablet under the tongue Every 5 (Five) Minutes As Needed for Chest Pain. Take no more than 3 doses in 15 minutes., Disp: 30 tablet, Rfl: 3  •  O2 (OXYGEN), Inhale 3 L/min Daily. Pt uses oxygen at home only , Disp: , Rfl:   •  ranitidine (ZANTAC) 150 MG tablet, Take 150 mg by mouth 2 (two) times a day., Disp: , Rfl:   •  tamsulosin (FLOMAX) 0.4 MG capsule 24 hr capsule, Take 1 capsule by mouth  Every Night., Disp: , Rfl:           ROS  Review of Systems   Cardiovascular: Positive for dyspnea on exertion, orthopnea and paroxysmal nocturnal dyspnea. Negative for irregular heartbeat.   Respiratory: Positive for cough, shortness of breath, sleep disturbances due to breathing and sputum production. Negative for snoring and wheezing.    Neurological: Positive for dizziness.       SOCIAL HX  Social History     Socioeconomic History   • Marital status:      Spouse name: Not on file   • Number of children: Not on file   • Years of education: Not on file   • Highest education level: Not on file   Tobacco Use   • Smoking status: Former Smoker     Packs/day: 1.00     Types: Cigarettes     Last attempt to quit: 2016     Years since quittin.9   • Smokeless tobacco: Never Used   Substance and Sexual Activity   • Alcohol use: No   • Drug use: No   • Sexual activity: Defer       FAMILY HX  History reviewed. No pertinent family history.          Vinayak Felix III, MD, FACC

## 2019-11-04 DIAGNOSIS — E78.00 HYPERCHOLESTEREMIA: ICD-10-CM

## 2019-11-04 RX ORDER — ISOSORBIDE DINITRATE 20 MG/1
TABLET ORAL
Qty: 180 TABLET | Refills: 0 | Status: SHIPPED | OUTPATIENT
Start: 2019-11-04 | End: 2020-03-02 | Stop reason: SDUPTHER

## 2019-11-04 RX ORDER — ATORVASTATIN CALCIUM 10 MG/1
10 TABLET, FILM COATED ORAL NIGHTLY
Qty: 90 TABLET | Refills: 0 | Status: SHIPPED | OUTPATIENT
Start: 2019-11-04 | End: 2020-03-02 | Stop reason: SDUPTHER

## 2020-01-15 ENCOUNTER — CLINICAL SUPPORT NO REQUIREMENTS (OUTPATIENT)
Dept: CARDIOLOGY | Facility: CLINIC | Age: 70
End: 2020-01-15

## 2020-01-15 DIAGNOSIS — I25.5 ISCHEMIC CARDIOMYOPATHY: ICD-10-CM

## 2020-01-15 PROCEDURE — 93295 DEV INTERROG REMOTE 1/2/MLT: CPT | Performed by: INTERNAL MEDICINE

## 2020-01-15 PROCEDURE — 93296 REM INTERROG EVL PM/IDS: CPT | Performed by: INTERNAL MEDICINE

## 2020-03-02 DIAGNOSIS — E78.00 HYPERCHOLESTEREMIA: ICD-10-CM

## 2020-03-02 RX ORDER — ATORVASTATIN CALCIUM 10 MG/1
10 TABLET, FILM COATED ORAL NIGHTLY
Qty: 90 TABLET | Refills: 0 | Status: SHIPPED | OUTPATIENT
Start: 2020-03-02 | End: 2020-04-21 | Stop reason: SDUPTHER

## 2020-03-02 RX ORDER — ISOSORBIDE DINITRATE 20 MG/1
TABLET ORAL
Qty: 180 TABLET | Refills: 0 | Status: SHIPPED | OUTPATIENT
Start: 2020-03-02 | End: 2020-04-21 | Stop reason: SDUPTHER

## 2020-04-21 ENCOUNTER — OFFICE VISIT (OUTPATIENT)
Dept: CARDIOLOGY | Facility: CLINIC | Age: 70
End: 2020-04-21

## 2020-04-21 VITALS
SYSTOLIC BLOOD PRESSURE: 102 MMHG | HEIGHT: 72 IN | OXYGEN SATURATION: 80 % | WEIGHT: 246 LBS | DIASTOLIC BLOOD PRESSURE: 73 MMHG | HEART RATE: 88 BPM | BODY MASS INDEX: 33.32 KG/M2

## 2020-04-21 DIAGNOSIS — I25.5 ISCHEMIC CARDIOMYOPATHY: ICD-10-CM

## 2020-04-21 DIAGNOSIS — E78.00 HYPERCHOLESTEREMIA: ICD-10-CM

## 2020-04-21 DIAGNOSIS — I10 HTN (HYPERTENSION), BENIGN: ICD-10-CM

## 2020-04-21 DIAGNOSIS — I25.10 CORONARY ARTERY DISEASE INVOLVING NATIVE CORONARY ARTERY OF NATIVE HEART WITHOUT ANGINA PECTORIS: Primary | ICD-10-CM

## 2020-04-21 DIAGNOSIS — I50.22 SYSTOLIC CHF, CHRONIC (HCC): ICD-10-CM

## 2020-04-21 PROCEDURE — 99442 PR PHYS/QHP TELEPHONE EVALUATION 11-20 MIN: CPT | Performed by: INTERNAL MEDICINE

## 2020-04-21 RX ORDER — FUROSEMIDE 20 MG/1
20 TABLET ORAL AS NEEDED
Qty: 90 TABLET | Refills: 1 | Status: SHIPPED | OUTPATIENT
Start: 2020-04-21 | End: 2020-11-06 | Stop reason: SDUPTHER

## 2020-04-21 RX ORDER — ATORVASTATIN CALCIUM 10 MG/1
10 TABLET, FILM COATED ORAL NIGHTLY
Qty: 90 TABLET | Refills: 3 | Status: SHIPPED | OUTPATIENT
Start: 2020-04-21 | End: 2021-04-08 | Stop reason: SDUPTHER

## 2020-04-21 RX ORDER — METOPROLOL SUCCINATE 25 MG/1
25 TABLET, EXTENDED RELEASE ORAL DAILY
Qty: 90 TABLET | Refills: 3 | Status: SHIPPED | OUTPATIENT
Start: 2020-04-21 | End: 2020-11-06 | Stop reason: SDUPTHER

## 2020-04-21 RX ORDER — ISOSORBIDE DINITRATE 20 MG/1
TABLET ORAL
Qty: 180 TABLET | Refills: 3 | Status: SHIPPED | OUTPATIENT
Start: 2020-04-21 | End: 2020-11-06 | Stop reason: SDUPTHER

## 2020-04-21 RX ORDER — NITROGLYCERIN 0.4 MG/1
0.4 TABLET SUBLINGUAL
Qty: 30 TABLET | Refills: 3 | Status: SHIPPED | OUTPATIENT
Start: 2020-04-21 | End: 2021-04-08 | Stop reason: SDUPTHER

## 2020-04-21 NOTE — PROGRESS NOTES
[facility]  Office visit  Morgan Luna  1950  [phone]    VISIT DATE:  4/21/2020    This patient has consented to a telehealth visit via telephone. The visit was scheduled as a routine visit to comply with patient safety concerns in accordance with CDC recommendations.  All vitals recorded within this visit are reported by the patient.  I spent 15 minutes in total including but not limited to the 11 minutes spent in direct conversation with this patient.     PCP: Risa Morales MD  [address] [place] [address] [address] [place] [address]    CC:  Chief Complaint   Patient presents with   • Cardiomyopathy       PROBLEM LIST:  1. Ischemic cardiomyopathy:  a. History of 4-vessel CABG in 1996, incomplete database.  b. History of post-CABG stenting, 2004, incomplete database.  c. Status post pacemaker defibrillator implantation, 2015, Dr. Fernandez.  2. Black lung disease/chronic obstructive pulmonary disease/ongoing tobacco.  3. Anxiety.  4. Benign hypertension.  5. Hypercholesterolemia.  6. Acid reflux.  7. Family history.  8. Surgical history:  a. Coronary artery bypass grafting.  b. Back surgery.  c. ICD implantation.    March 2019 echo   Endocardial definition too poor to make an accurate estimation of LVEF,   would require IV echocontrast or alternative imaging modality.   Moderately increased left ventricular cavity size.   There is mild concentric left ventricular hypertrophy.   Diastolic filling parameters suggests grade I diastolic dysfunction .    ASSESSMENT:   Diagnosis Plan   1. Coronary artery disease involving native coronary artery of native heart without angina pectoris     2. Systolic CHF, chronic (CMS/HCC)     3. Hypercholesteremia     4. HTN (hypertension), benign       PLAN:  Coronary artery disease: Currently stable and asymptomatic.  Afterload is well-controlled.  Continue statin and low-dose aspirin    Congestive heart failure, chronic, systolic: Currently euvolemic  "and compensated.  On Max tolerated medical therapy.  Continue routine follow-up in device clinic.      Hyperlipidemia: Goal LDL less than 70.  Currently well controlled. Continue current medical therapy    Nonsustained ventricular tachycardia: Continue beta-blockade.    Paroxysmal atrial flutter: Minimal burden of arrhythmia, not recommending anticoagulation or antiarrhythmic therapy at this time.  Currently asymptomatic.    Subjective  Most of his limitations are related to underlying black lung, currently on 3 L nasal cannula oxygen.  Following with nephrology routinely.  He is compliant with medical therapy.  Compliant with supplemental oxygen as well.  Blood pressures running less than 130/80 mmHg.  Maintaining social distancing and essentially homebound during coronavirus quarantine.    PHYSICAL EXAMINATION:  Vitals:    04/21/20 1109   BP: 102/73   BP Location: Right arm   Patient Position: Sitting   Pulse: 88   SpO2: (!) 80%   Weight: 112 kg (246 lb)   Height: 182.9 cm (72\")       Diagnostic Data:  Procedures  Lab Results   Component Value Date    CHLPL 134 02/08/2018    TRIG 133 02/08/2018    HDL 41 02/08/2018     No results found for: GLUCOSE, BUN, CREATININE, NA, K, CL, CO2, CREATININE, BUN  No results found for: HGBA1C  No results found for: WBC, HGB, HCT, PLT    Allergies  Allergies   Allergen Reactions   • Codeine      GI UPSET       Current Medications    Current Outpatient Medications:   •  albuterol (PROVENTIL HFA;VENTOLIN HFA) 108 (90 BASE) MCG/ACT inhaler, Inhale 2 puffs every 4 (four) hours as needed for wheezing., Disp: , Rfl:   •  albuterol (PROVENTIL) (2.5 MG/3ML) 0.083% nebulizer solution, Take 2.5 mg by nebulization Daily As Needed., Disp: , Rfl: 2  •  ANORO ELLIPTA 62.5-25 MCG/INH aerosol powder  inhaler, Inhale 1 puff Daily., Disp: , Rfl: 0  •  aspirin 81 MG EC tablet, Take 1 tablet by mouth Daily., Disp: 30 tablet, Rfl: 11  •  atorvastatin (LIPITOR) 10 MG tablet, Take 1 tablet by mouth " Every Night., Disp: 90 tablet, Rfl: 0  •  diazepam (VALIUM) 5 MG tablet, Take 5 mg by mouth Daily., Disp: , Rfl:   •  furosemide (LASIX) 20 MG tablet, Take 1 tablet by mouth Daily. (Patient taking differently: Take 20 mg by mouth As Needed.), Disp: 90 tablet, Rfl: 1  •  HYDROcodone-acetaminophen (NORCO) 7.5-325 MG per tablet, Take 1 tablet by mouth Daily As Needed., Disp: , Rfl: 0  •  isosorbide dinitrate (ISORDIL) 20 MG tablet, TAKE 2 TABS DAILY, Disp: 180 tablet, Rfl: 0  •  metoprolol succinate XL (TOPROL-XL) 25 MG 24 hr tablet, Take 1 tablet by mouth Daily., Disp: 30 tablet, Rfl: 11  •  nitroglycerin (NITROSTAT) 0.4 MG SL tablet, Place 1 tablet under the tongue Every 5 (Five) Minutes As Needed for Chest Pain. Take no more than 3 doses in 15 minutes., Disp: 30 tablet, Rfl: 3  •  O2 (OXYGEN), Inhale 3 L/min Daily. Pt uses oxygen at home only , Disp: , Rfl:   •  ranitidine (ZANTAC) 150 MG tablet, Take 150 mg by mouth 2 (two) times a day., Disp: , Rfl:   •  tamsulosin (FLOMAX) 0.4 MG capsule 24 hr capsule, Take 1 capsule by mouth Every Night., Disp: , Rfl:           ROS  Review of Systems   Cardiovascular: Positive for dyspnea on exertion, orthopnea and paroxysmal nocturnal dyspnea. Negative for irregular heartbeat.   Respiratory: Positive for cough, shortness of breath, sleep disturbances due to breathing and sputum production. Negative for snoring and wheezing.    Neurological: Positive for dizziness.       SOCIAL HX  Social History     Socioeconomic History   • Marital status:      Spouse name: Not on file   • Number of children: Not on file   • Years of education: Not on file   • Highest education level: Not on file   Tobacco Use   • Smoking status: Former Smoker     Packs/day: 1.00     Types: Cigarettes     Last attempt to quit: 11/2016     Years since quitting: 3.4   • Smokeless tobacco: Never Used   Substance and Sexual Activity   • Alcohol use: No   • Drug use: No   • Sexual activity: Defer        FAMILY HX  History reviewed. No pertinent family history.          Vinayak Felix III, MD, FACC

## 2020-07-23 ENCOUNTER — OFFICE VISIT (OUTPATIENT)
Dept: CARDIOLOGY | Facility: CLINIC | Age: 70
End: 2020-07-23

## 2020-07-23 VITALS
HEART RATE: 87 BPM | DIASTOLIC BLOOD PRESSURE: 70 MMHG | BODY MASS INDEX: 33.59 KG/M2 | SYSTOLIC BLOOD PRESSURE: 118 MMHG | WEIGHT: 248 LBS | OXYGEN SATURATION: 98 % | HEIGHT: 72 IN

## 2020-07-23 DIAGNOSIS — I25.5 ISCHEMIC CARDIOMYOPATHY: Primary | ICD-10-CM

## 2020-07-23 DIAGNOSIS — I50.22 SYSTOLIC CHF, CHRONIC (HCC): ICD-10-CM

## 2020-07-23 DIAGNOSIS — I10 HTN (HYPERTENSION), BENIGN: ICD-10-CM

## 2020-07-23 DIAGNOSIS — E78.00 HYPERCHOLESTEREMIA: ICD-10-CM

## 2020-07-23 DIAGNOSIS — I25.10 CORONARY ARTERY DISEASE INVOLVING NATIVE CORONARY ARTERY OF NATIVE HEART WITHOUT ANGINA PECTORIS: ICD-10-CM

## 2020-07-23 PROCEDURE — 93284 PRGRMG EVAL IMPLANTABLE DFB: CPT | Performed by: INTERNAL MEDICINE

## 2020-07-23 PROCEDURE — 99214 OFFICE O/P EST MOD 30 MIN: CPT | Performed by: INTERNAL MEDICINE

## 2020-07-23 RX ORDER — FAMOTIDINE 20 MG/1
1 TABLET, FILM COATED ORAL NIGHTLY PRN
COMMUNITY
Start: 2020-06-19

## 2020-07-23 NOTE — PROGRESS NOTES
[facility]  Office visit  Morgan Luna  1950  [phone]    VISIT DATE:  7/23/2020      PCP: Nini Jhaveri, Risa Staton MD  [address] [place] [address] [address] [place] [address]    CC:  Chief Complaint   Patient presents with   • Shortness of Breath   • Coronary Artery Disease       PROBLEM LIST:  1. Ischemic cardiomyopathy:  a. History of 4-vessel CABG in 1996, incomplete database.  b. History of post-CABG stenting, 2004, incomplete database.  c. Status post pacemaker defibrillator implantation, 2015, Dr. Fernandez.  2. Black lung disease/chronic obstructive pulmonary disease/ongoing tobacco.  3. Anxiety.  4. Benign hypertension.  5. Hypercholesterolemia.  6. Acid reflux.  7. Family history.  8. Surgical history:  a. Coronary artery bypass grafting.  b. Back surgery.  c. ICD implantation.    March 2019 echo   Endocardial definition too poor to make an accurate estimation of LVEF,   would require IV echocontrast or alternative imaging modality.   Moderately increased left ventricular cavity size.   There is mild concentric left ventricular hypertrophy.   Diastolic filling parameters suggests grade I diastolic dysfunction .    ASSESSMENT:   Diagnosis Plan   1. Ischemic cardiomyopathy     2. HTN (hypertension), benign     3. Hypercholesteremia     4. Systolic CHF, chronic (CMS/HCC)     5. Coronary artery disease involving native coronary artery of native heart without angina pectoris       Device interrogation:  [place] scientific biventricular ICD  9 % atrial pacing, sensed P-wave 3.7 mV, threshold 0.5 V at 0.5 ms, impedance 616 ohms  99 % biventricular pacing.  Sensed R wave 14.2 mV, threshold 1 V at 0.5 ms, impedance 500 ohms  Sensed LV 15 mV, threshold 2.2 V at 2 ms, impedance 591 ohms  Less than 1% mode switching.  Estimated battery life 3 years  Chronic positional diaphragmatic stimulation    PLAN:  Coronary artery disease: Currently stable and asymptomatic.  Afterload is well-controlled.   "Continue statin and low-dose aspirin    Congestive heart failure, chronic, systolic: Currently euvolemic and compensated.  On Max tolerated medical therapy.  Continue routine follow-up in device clinic.      Hyperlipidemia: Goal LDL less than 70.  Currently well controlled. Continue current medical therapy    Nonsustained ventricular tachycardia: Continue beta-blockade, continue metoprolol succinate 25 mg p.o. Daily.    Paroxysmal atrial flutter: Minimal burden of arrhythmia, not recommending anticoagulation or antiarrhythmic therapy at this time.  Currently asymptomatic.    Subjective  Most of his limitations are related to underlying black lung, currently on 3 L nasal cannula oxygen.  Following with nephrology routinely, previous medication changes made by nephrology include switching carvedilol to metoprolol tartrate, and discontinuation of Imdur and lisinopril in order to improve renal perfusion.  He is compliant with medical therapy.  Compliant with supplemental oxygen as well.  Blood pressures running less than 130/80 mmHg.    PHYSICAL EXAMINATION:  Vitals:    07/23/20 1007   BP: 118/70   BP Location: Left arm   Patient Position: Sitting   Pulse: 87   SpO2: 98%   Weight: 112 kg (248 lb)   Height: 182.9 cm (72\")     General Appearance:    Alert, cooperative, no distress, appears stated age   Head:    Normocephalic, without obvious abnormality, atraumatic   Eyes:    conjunctiva/corneas clear   Nose:   Nares normal, septum midline, mucosa normal, no drainage   Throat:   Lips, teeth and gums normal   Neck:   Supple, symmetrical, trachea midline, no carotid    bruit or JVD   Lungs:     Clear to auscultation bilaterally, respirations unlabored   Chest Wall:    No tenderness or deformity    Heart:    Regular rate and rhythm, S1 and S2 normal, no murmur, rub   or gallop, normal carotid impulse bilaterally without bruit.   Abdomen:     Soft, non-tender   Extremities:   Extremities normal, atraumatic, no cyanosis or " edema   Pulses:   2+ and symmetric all extremities   Skin:   Skin color, texture, turgor normal, no rashes or lesions       Diagnostic Data:  Procedures  Lab Results   Component Value Date    CHLPL 134 02/08/2018    TRIG 133 02/08/2018    HDL 41 02/08/2018     No results found for: GLUCOSE, BUN, CREATININE, NA, K, CL, CO2, CREATININE, BUN  No results found for: HGBA1C  No results found for: WBC, HGB, HCT, PLT    Allergies  Allergies   Allergen Reactions   • Codeine      GI UPSET       Current Medications    Current Outpatient Medications:   •  albuterol (PROVENTIL HFA;VENTOLIN HFA) 108 (90 BASE) MCG/ACT inhaler, Inhale 2 puffs every 4 (four) hours as needed for wheezing., Disp: , Rfl:   •  albuterol (PROVENTIL) (2.5 MG/3ML) 0.083% nebulizer solution, Take 2.5 mg by nebulization Daily As Needed., Disp: , Rfl: 2  •  ANORO ELLIPTA 62.5-25 MCG/INH aerosol powder  inhaler, Inhale 1 puff Daily., Disp: , Rfl: 0  •  aspirin 81 MG EC tablet, Take 1 tablet by mouth Daily., Disp: 30 tablet, Rfl: 11  •  atorvastatin (LIPITOR) 10 MG tablet, Take 1 tablet by mouth Every Night., Disp: 90 tablet, Rfl: 3  •  diazepam (VALIUM) 5 MG tablet, Take 5 mg by mouth Daily., Disp: , Rfl:   •  famotidine (PEPCID) 20 MG tablet, Take 1 tablet by mouth Daily., Disp: , Rfl:   •  furosemide (LASIX) 20 MG tablet, Take 1 tablet by mouth As Needed (edema)., Disp: 90 tablet, Rfl: 1  •  HYDROcodone-acetaminophen (NORCO) 7.5-325 MG per tablet, Take 1 tablet by mouth Daily As Needed., Disp: , Rfl: 0  •  isosorbide dinitrate (ISORDIL) 20 MG tablet, TAKE 2 TABS DAILY, Disp: 180 tablet, Rfl: 3  •  metoprolol succinate XL (TOPROL-XL) 25 MG 24 hr tablet, Take 1 tablet by mouth Daily., Disp: 90 tablet, Rfl: 3  •  nitroglycerin (NITROSTAT) 0.4 MG SL tablet, Place 1 tablet under the tongue Every 5 (Five) Minutes As Needed for Chest Pain. Take no more than 3 doses in 15 minutes., Disp: 30 tablet, Rfl: 3  •  O2 (OXYGEN), Inhale 3 L/min Daily. Pt uses oxygen at home  only , Disp: , Rfl:   •  tamsulosin (FLOMAX) 0.4 MG capsule 24 hr capsule, Take 1 capsule by mouth Every Night., Disp: , Rfl:           ROS  Review of Systems   Cardiovascular: Positive for dyspnea on exertion, orthopnea and paroxysmal nocturnal dyspnea. Negative for irregular heartbeat.   Respiratory: Positive for cough, shortness of breath, sleep disturbances due to breathing and sputum production. Negative for snoring and wheezing.    Neurological: Positive for dizziness.       SOCIAL HX  Social History     Socioeconomic History   • Marital status:      Spouse name: Not on file   • Number of children: Not on file   • Years of education: Not on file   • Highest education level: Not on file   Tobacco Use   • Smoking status: Former Smoker     Packs/day: 1.00     Types: Cigarettes     Last attempt to quit: 11/2016     Years since quitting: 3.7   • Smokeless tobacco: Never Used   Substance and Sexual Activity   • Alcohol use: No   • Drug use: No   • Sexual activity: Defer       FAMILY HX  History reviewed. No pertinent family history.          Vinayak Felix III, MD, FACC

## 2020-11-06 DIAGNOSIS — I50.22 SYSTOLIC CHF, CHRONIC (HCC): ICD-10-CM

## 2020-11-06 DIAGNOSIS — I25.5 ISCHEMIC CARDIOMYOPATHY: ICD-10-CM

## 2020-11-06 DIAGNOSIS — I10 HTN (HYPERTENSION), BENIGN: ICD-10-CM

## 2020-11-06 RX ORDER — ISOSORBIDE DINITRATE 20 MG/1
TABLET ORAL
Qty: 180 TABLET | Refills: 3 | Status: SHIPPED | OUTPATIENT
Start: 2020-11-06 | End: 2021-01-01 | Stop reason: SDUPTHER

## 2020-11-06 RX ORDER — FUROSEMIDE 20 MG/1
20 TABLET ORAL AS NEEDED
Qty: 90 TABLET | Refills: 1 | Status: SHIPPED | OUTPATIENT
Start: 2020-11-06 | End: 2021-04-08 | Stop reason: SDUPTHER

## 2020-11-06 RX ORDER — METOPROLOL SUCCINATE 25 MG/1
25 TABLET, EXTENDED RELEASE ORAL DAILY
Qty: 90 TABLET | Refills: 3 | Status: SHIPPED | OUTPATIENT
Start: 2020-11-06 | End: 2021-01-01 | Stop reason: SDUPTHER

## 2020-11-06 NOTE — TELEPHONE ENCOUNTER
Medication Refill Request    Medication:  Lasix 20 mg daily as needed  Isosorbide 20 mg 2 tablets daily  Toprol XL 25 mg daily      Pertinent Labs: (No updated labs for patient on file)  No results found for: GLUCOSE, BUN, CREATININE, EGFRIFNONA, EGFRIFAFRI, BCR, K, CO2, CALCIUM, ALBUMIN, BILIRUBIN, ALKPHOS, AST, ALT   Lab Results   Component Value Date    CHLPL 134 02/08/2018    TRIG 133 02/08/2018    HDL 41 02/08/2018    LDL 66 02/08/2018     No results found for: HGBA1C  No results found for: WBC, HGB, HCT, MCV, PLT  No results found for: TSH

## 2021-01-01 ENCOUNTER — OFFICE VISIT (OUTPATIENT)
Dept: CARDIOLOGY | Facility: CLINIC | Age: 71
End: 2021-01-01

## 2021-01-01 ENCOUNTER — TELEPHONE (OUTPATIENT)
Dept: CARDIOLOGY | Facility: HOSPITAL | Age: 71
End: 2021-01-01

## 2021-01-01 VITALS
OXYGEN SATURATION: 92 % | HEART RATE: 98 BPM | BODY MASS INDEX: 32.23 KG/M2 | WEIGHT: 238 LBS | HEIGHT: 72 IN | SYSTOLIC BLOOD PRESSURE: 98 MMHG | DIASTOLIC BLOOD PRESSURE: 58 MMHG

## 2021-01-01 DIAGNOSIS — I10 HTN (HYPERTENSION), BENIGN: ICD-10-CM

## 2021-01-01 DIAGNOSIS — E78.00 HYPERCHOLESTEREMIA: ICD-10-CM

## 2021-01-01 DIAGNOSIS — I25.10 CORONARY ARTERY DISEASE INVOLVING NATIVE CORONARY ARTERY OF NATIVE HEART WITHOUT ANGINA PECTORIS: Primary | ICD-10-CM

## 2021-01-01 DIAGNOSIS — I50.20 HEART FAILURE WITH REDUCED EJECTION FRACTION (HCC): ICD-10-CM

## 2021-01-01 PROCEDURE — 99214 OFFICE O/P EST MOD 30 MIN: CPT | Performed by: INTERNAL MEDICINE

## 2021-01-01 RX ORDER — ISOSORBIDE DINITRATE 20 MG/1
TABLET ORAL
Qty: 180 TABLET | Refills: 0 | Status: SHIPPED | OUTPATIENT
Start: 2021-01-01 | End: 2022-01-01 | Stop reason: SDUPTHER

## 2021-01-01 RX ORDER — METOPROLOL SUCCINATE 25 MG/1
25 TABLET, EXTENDED RELEASE ORAL DAILY
Qty: 90 TABLET | Refills: 3 | Status: SHIPPED | OUTPATIENT
Start: 2021-01-01

## 2021-03-22 PROCEDURE — 93295 DEV INTERROG REMOTE 1/2/MLT: CPT | Performed by: INTERNAL MEDICINE

## 2021-03-22 PROCEDURE — 93296 REM INTERROG EVL PM/IDS: CPT | Performed by: INTERNAL MEDICINE

## 2021-04-08 ENCOUNTER — OFFICE VISIT (OUTPATIENT)
Dept: CARDIOLOGY | Facility: CLINIC | Age: 71
End: 2021-04-08

## 2021-04-08 VITALS
WEIGHT: 258 LBS | OXYGEN SATURATION: 94 % | HEART RATE: 88 BPM | HEIGHT: 72 IN | DIASTOLIC BLOOD PRESSURE: 70 MMHG | BODY MASS INDEX: 34.95 KG/M2 | SYSTOLIC BLOOD PRESSURE: 130 MMHG

## 2021-04-08 DIAGNOSIS — I10 HTN (HYPERTENSION), BENIGN: ICD-10-CM

## 2021-04-08 DIAGNOSIS — E78.00 HYPERCHOLESTEREMIA: ICD-10-CM

## 2021-04-08 DIAGNOSIS — I25.5 ISCHEMIC CARDIOMYOPATHY: ICD-10-CM

## 2021-04-08 DIAGNOSIS — I25.10 CORONARY ARTERY DISEASE INVOLVING NATIVE CORONARY ARTERY OF NATIVE HEART WITHOUT ANGINA PECTORIS: Primary | ICD-10-CM

## 2021-04-08 DIAGNOSIS — I50.22 SYSTOLIC CHF, CHRONIC (HCC): ICD-10-CM

## 2021-04-08 PROCEDURE — 93284 PRGRMG EVAL IMPLANTABLE DFB: CPT | Performed by: INTERNAL MEDICINE

## 2021-04-08 PROCEDURE — 99214 OFFICE O/P EST MOD 30 MIN: CPT | Performed by: INTERNAL MEDICINE

## 2021-04-08 RX ORDER — ATORVASTATIN CALCIUM 10 MG/1
10 TABLET, FILM COATED ORAL NIGHTLY
Qty: 30 TABLET | Refills: 11 | Status: SHIPPED | OUTPATIENT
Start: 2021-04-08 | End: 2022-01-01 | Stop reason: SDUPTHER

## 2021-04-08 RX ORDER — FUROSEMIDE 20 MG/1
20 TABLET ORAL AS NEEDED
Qty: 30 TABLET | Refills: 11 | Status: SHIPPED | OUTPATIENT
Start: 2021-04-08

## 2021-04-08 RX ORDER — NITROGLYCERIN 0.4 MG/1
0.4 TABLET SUBLINGUAL
Qty: 30 TABLET | Refills: 3 | Status: ON HOLD | OUTPATIENT
Start: 2021-04-08 | End: 2022-01-01

## 2021-04-08 NOTE — PROGRESS NOTES
[facility]  Office visit  Morgan Luna  1950  [phone]    VISIT DATE:  4/8/2021      PCP: Risa Morales MD  [address] [place] [address] [address] [place] [id number]    CC:  Chief Complaint   Patient presents with   • Cardiomyopathy   • Shortness of Breath       PROBLEM LIST:  1. Ischemic cardiomyopathy:  a. History of 4-vessel CABG in 1996, incomplete database.  b. History of post-CABG stenting, 2004, incomplete database.  c. Status post pacemaker defibrillator implantation, 2015, Dr. Fernandez.  2. Black lung disease/chronic obstructive pulmonary disease/ongoing tobacco.  3. Anxiety.  4. Benign hypertension.  5. Hypercholesterolemia.  6. Acid reflux.  7. Family history.  8. Surgical history:  a. Coronary artery bypass grafting.  b. Back surgery.  c. ICD implantation.    March 2019 echo   Endocardial definition too poor to make an accurate estimation of LVEF,   would require IV echocontrast or alternative imaging modality.   Moderately increased left ventricular cavity size.   There is mild concentric left ventricular hypertrophy.   Diastolic filling parameters suggests grade I diastolic dysfunction .    ASSESSMENT:   Diagnosis Plan   1. Coronary artery disease involving native coronary artery of native heart without angina pectoris     2. Systolic CHF, chronic (CMS/HCC)  furosemide (LASIX) 20 MG tablet   3. HTN (hypertension), benign  furosemide (LASIX) 20 MG tablet   4. Hypercholesteremia  atorvastatin (LIPITOR) 10 MG tablet   5. Ischemic cardiomyopathy  furosemide (LASIX) 20 MG tablet     Device interrogation:  [place] scientific biventricular ICD  13 % atrial pacing, sensed P-wave 5.3 mV, threshold 1 V at 0.5 ms, impedance 660 ohms  99 % biventricular pacing.  Sensed R wave 11.7 mV, threshold 1.1 V at 0.5 ms, impedance 504 ohms  Sensed LV 15 mV, threshold 2.5 V at 2 ms, impedance 626 ohms  Less than 1% mode switching.  Brief episodes of A. fib, 2 brief episodes of NSVT  Estimated  "battery life 2.5 years  Chronic positional diaphragmatic stimulation    PLAN:  Coronary artery disease: Currently stable and asymptomatic.  Afterload is well-controlled.  Continue statin and low-dose aspirin    Congestive heart failure, chronic, systolic: Currently euvolemic and compensated.  On Max tolerated medical therapy.  Continue routine follow-up in device clinic.      Hyperlipidemia: Goal LDL less than 70.  Currently well controlled. Continue current medical therapy    Nonsustained ventricular tachycardia: Continue beta-blockade, continue metoprolol succinate 25 mg p.o. Daily.    Paroxysmal atrial flutter: Minimal burden of arrhythmia, not recommending anticoagulation or antiarrhythmic therapy at this time.  Currently asymptomatic.    Subjective  Most of his limitations are related to underlying black lung, currently on 3 L nasal cannula oxygen.  Following with nephrology routinely, previous medication changes made by nephrology include switching carvedilol to metoprolol tartrate, and discontinuation of Imdur and lisinopril in order to improve renal perfusion.  He is compliant with medical therapy.  Compliant with supplemental oxygen as well.  Blood pressures running less than 130/80 mmHg.    PHYSICAL EXAMINATION:  Vitals:    04/08/21 1259   BP: 130/70   BP Location: Left arm   Patient Position: Sitting   Pulse: 88   SpO2: 94%   Weight: 117 kg (258 lb)   Height: 182.9 cm (72\")     General Appearance:    Alert, cooperative, no distress, appears stated age   Head:    Normocephalic, without obvious abnormality, atraumatic   Eyes:    conjunctiva/corneas clear   Nose:   Nares normal, septum midline, mucosa normal, no drainage   Throat:   Lips, teeth and gums normal   Neck:   Supple, symmetrical, trachea midline, no carotid    bruit or JVD   Lungs:     Clear to auscultation bilaterally, respirations unlabored   Chest Wall:    No tenderness or deformity    Heart:    Regular rate and rhythm, S1 and S2 normal, no " murmur, rub   or gallop, normal carotid impulse bilaterally without bruit.   Abdomen:     Soft, non-tender   Extremities:   Extremities normal, atraumatic, no cyanosis or edema   Pulses:   2+ and symmetric all extremities   Skin:   Skin color, texture, turgor normal, no rashes or lesions       Diagnostic Data:  Procedures  Lab Results   Component Value Date    CHLPL 134 02/08/2018    TRIG 133 02/08/2018    HDL 41 02/08/2018     No results found for: GLUCOSE, BUN, CREATININE, NA, K, CL, CO2, CREATININE, BUN  No results found for: HGBA1C  No results found for: WBC, HGB, HCT, PLT    Allergies  Allergies   Allergen Reactions   • Codeine      GI UPSET       Current Medications    Current Outpatient Medications:   •  albuterol (PROVENTIL HFA;VENTOLIN HFA) 108 (90 BASE) MCG/ACT inhaler, Inhale 2 puffs every 4 (four) hours as needed for wheezing., Disp: , Rfl:   •  albuterol (PROVENTIL) (2.5 MG/3ML) 0.083% nebulizer solution, Take 2.5 mg by nebulization Daily As Needed., Disp: , Rfl: 2  •  ANORO ELLIPTA 62.5-25 MCG/INH aerosol powder  inhaler, Inhale 1 puff Daily., Disp: , Rfl: 0  •  aspirin 81 MG EC tablet, Take 1 tablet by mouth Daily., Disp: 30 tablet, Rfl: 11  •  atorvastatin (LIPITOR) 10 MG tablet, Take 1 tablet by mouth Every Night., Disp: 30 tablet, Rfl: 11  •  diazepam (VALIUM) 5 MG tablet, Take 5 mg by mouth Daily., Disp: , Rfl:   •  famotidine (PEPCID) 20 MG tablet, Take 1 tablet by mouth Daily., Disp: , Rfl:   •  furosemide (LASIX) 20 MG tablet, Take 1 tablet by mouth As Needed (edema)., Disp: 30 tablet, Rfl: 11  •  HYDROcodone-acetaminophen (NORCO) 7.5-325 MG per tablet, Take 1 tablet by mouth Daily As Needed., Disp: , Rfl: 0  •  isosorbide dinitrate (ISORDIL) 20 MG tablet, TAKE 2 TABS DAILY, Disp: 180 tablet, Rfl: 3  •  metoprolol succinate XL (TOPROL-XL) 25 MG 24 hr tablet, Take 1 tablet by mouth Daily., Disp: 90 tablet, Rfl: 3  •  nitroglycerin (NITROSTAT) 0.4 MG SL tablet, Place 1 tablet under the tongue  Every 5 (Five) Minutes As Needed for Chest Pain. Take no more than 3 doses in 15 minutes., Disp: 30 tablet, Rfl: 3  •  O2 (OXYGEN), Inhale 3 L/min Daily. Pt uses oxygen at home only , Disp: , Rfl:   •  tamsulosin (FLOMAX) 0.4 MG capsule 24 hr capsule, Take 1 capsule by mouth Every Night., Disp: , Rfl:           ROS  Review of Systems   Cardiovascular: Positive for dyspnea on exertion, orthopnea and paroxysmal nocturnal dyspnea. Negative for irregular heartbeat.   Respiratory: Positive for cough, shortness of breath, sleep disturbances due to breathing and sputum production. Negative for snoring and wheezing.    Neurological: Positive for dizziness.       SOCIAL HX  Social History     Socioeconomic History   • Marital status:      Spouse name: Not on file   • Number of children: Not on file   • Years of education: Not on file   • Highest education level: Not on file   Tobacco Use   • Smoking status: Former Smoker     Packs/day: 1.00     Types: Cigarettes     Quit date: 2016     Years since quittin.4   • Smokeless tobacco: Never Used   Substance and Sexual Activity   • Alcohol use: No   • Drug use: No   • Sexual activity: Defer       FAMILY HX  History reviewed. No pertinent family history.          Vinayak Felix III, MD, FACC

## 2021-05-21 NOTE — TELEPHONE ENCOUNTER
----- Message from Vinayak Felix III, MD sent at 5/21/2021  8:58 AM EDT -----  Reviewed recent remote pacemaker interrogation.  Episodes of atrial fibrillation are beginning to last longer.  Recommending that he start Eliquis 5 mg p.o. twice daily to prevent strokes.  Would have him stop his aspirin when he starts Eliquis.

## 2021-07-22 ENCOUNTER — HOSPITAL ENCOUNTER (OUTPATIENT)
Facility: HOSPITAL | Age: 71
Discharge: HOME | End: 2021-07-22
Payer: MEDICARE

## 2021-07-22 ENCOUNTER — OFFICE VISIT (OUTPATIENT)
Dept: CARDIOLOGY | Facility: CLINIC | Age: 71
End: 2021-07-22

## 2021-07-22 VITALS
OXYGEN SATURATION: 96 % | BODY MASS INDEX: 33.59 KG/M2 | WEIGHT: 248 LBS | HEIGHT: 72 IN | HEART RATE: 82 BPM | SYSTOLIC BLOOD PRESSURE: 108 MMHG | DIASTOLIC BLOOD PRESSURE: 62 MMHG

## 2021-07-22 DIAGNOSIS — I10 HTN (HYPERTENSION), BENIGN: ICD-10-CM

## 2021-07-22 DIAGNOSIS — I25.10 CORONARY ARTERY DISEASE INVOLVING NATIVE CORONARY ARTERY OF NATIVE HEART WITHOUT ANGINA PECTORIS: ICD-10-CM

## 2021-07-22 DIAGNOSIS — I50.22 SYSTOLIC CHF, CHRONIC (HCC): ICD-10-CM

## 2021-07-22 DIAGNOSIS — R07.89 CHEST PAIN, ATYPICAL: Primary | ICD-10-CM

## 2021-07-22 DIAGNOSIS — E78.00 HYPERCHOLESTEREMIA: ICD-10-CM

## 2021-07-22 PROCEDURE — 93000 ELECTROCARDIOGRAM COMPLETE: CPT | Performed by: INTERNAL MEDICINE

## 2021-07-22 PROCEDURE — 93005 ELECTROCARDIOGRAM TRACING: CPT

## 2021-07-22 PROCEDURE — 99214 OFFICE O/P EST MOD 30 MIN: CPT | Performed by: INTERNAL MEDICINE

## 2021-07-22 RX ORDER — BUDESONIDE, GLYCOPYRROLATE, AND FORMOTEROL FUMARATE 160; 9; 4.8 UG/1; UG/1; UG/1
2 AEROSOL, METERED RESPIRATORY (INHALATION) 2 TIMES DAILY
COMMUNITY

## 2021-07-22 RX ORDER — CEFDINIR 300 MG/1
300 CAPSULE ORAL 2 TIMES DAILY
COMMUNITY
End: 2021-09-01

## 2021-07-22 RX ORDER — SENNOSIDES 8.6 MG
1-2 CAPSULE ORAL AS NEEDED
Status: ON HOLD | COMMUNITY
End: 2022-01-01

## 2021-07-22 NOTE — PROGRESS NOTES
[facility]  Office visit  Morgan Luna  1950  [phone]    VISIT DATE:  7/22/2021      PCP: Risa Morales MD  [address] [place] [address] [address] [place] [address]    CC:  Chief Complaint   Patient presents with   • Coronary Artery Disease       PROBLEM LIST:  1. Ischemic cardiomyopathy:  a. History of 4-vessel CABG in 1996, incomplete database.  b. History of post-CABG stenting, 2004, incomplete database.  c. Status post pacemaker defibrillator implantation, 2015, Dr. Fernandez.  2. Black lung disease/chronic obstructive pulmonary disease/ongoing tobacco.  3. Anxiety.  4. Benign hypertension.  5. Hypercholesterolemia.  6. Acid reflux.  7. Family history.  8. Surgical history:  a. Coronary artery bypass grafting.  b. Back surgery.  c. ICD implantation.    March 2019 echo   Endocardial definition too poor to make an accurate estimation of LVEF,   would require IV echocontrast or alternative imaging modality.   Moderately increased left ventricular cavity size.   There is mild concentric left ventricular hypertrophy.   Diastolic filling parameters suggests grade I diastolic dysfunction .    ASSESSMENT:   Diagnosis Plan   1. Coronary artery disease involving native coronary artery of native heart without angina pectoris     2. HTN (hypertension), benign     3. Hypercholesteremia     4. Systolic CHF, chronic (CMS/HCC)       Device interrogation:  [place] scientific biventricular ICD      PLAN:  Coronary artery disease: Intermittent atypical angina.  Kelli scan myocardial perfusion imaging pending.  Afterload is well-controlled.  Continue statin and low-dose aspirin    Congestive heart failure, chronic, systolic: Currently euvolemic and compensated.  On Max tolerated medical therapy.  Continue routine follow-up in device clinic.      Hyperlipidemia: Goal LDL less than 70.  Currently well controlled. Continue current medical therapy    Nonsustained ventricular tachycardia: Continue  "beta-blockade, continue metoprolol succinate 25 mg p.o. Daily.    Paroxysmal atrial flutter: Minimal burden of arrhythmia, not recommending anticoagulation or antiarrhythmic therapy at this time.  Currently asymptomatic.    Subjective  Most of his limitations are related to underlying black lung, currently on 3 L nasal cannula oxygen.  Following with nephrology routinely, previous medication changes made by nephrology include switching carvedilol to metoprolol tartrate, and discontinuation of lisinopril in order to improve renal perfusion.  He is compliant with medical therapy.  Compliant with supplemental oxygen as well.  Blood pressures running less than 130/80 mmHg.  Recent COPD exacerbation treated with oral antibiotics and steroids.  Now returning back to baseline.  Reporting episodes of left precordial chest discomfort radiating to his left arm.  No obvious triggers.  Mild to moderate intensity.  Rest symptoms lasting less than 10 minutes.    PHYSICAL EXAMINATION:  Vitals:    07/22/21 1042   BP: 108/62   BP Location: Left arm   Patient Position: Sitting   Pulse: 82   SpO2: 96%   Weight: 112 kg (248 lb)   Height: 182.9 cm (72\")     General Appearance:    Alert, cooperative, no distress, appears stated age   Head:    Normocephalic, without obvious abnormality, atraumatic   Eyes:    conjunctiva/corneas clear   Nose:   Nares normal, septum midline, mucosa normal, no drainage   Throat:   Lips, teeth and gums normal   Neck:   Supple, symmetrical, trachea midline, no carotid    bruit or JVD   Lungs:     Clear to auscultation bilaterally, respirations unlabored   Chest Wall:    No tenderness or deformity    Heart:    Regular rate and rhythm, S1 and S2 normal, no murmur, rub   or gallop, normal carotid impulse bilaterally without bruit.   Abdomen:     Soft, non-tender   Extremities:   Extremities normal, atraumatic, no cyanosis or edema   Pulses:   2+ and symmetric all extremities   Skin:   Skin color, texture, turgor " normal, no rashes or lesions       Diagnostic Data:    ECG 12 Lead    Date/Time: 7/22/2021 11:24 AM  Performed by: Vinayak Felix III, MD  Authorized by: Vinayak Felix III, MD   Previous ECG: no previous ECG available  Rhythm: sinus rhythm  Rhythm comments: Atrial sensed, biventricular paced    Clinical impression: abnormal EKG          Lab Results   Component Value Date    CHLPL 134 02/08/2018    TRIG 133 02/08/2018    HDL 41 02/08/2018     No results found for: GLUCOSE, BUN, CREATININE, NA, K, CL, CO2, CREATININE, BUN  No results found for: HGBA1C  No results found for: WBC, HGB, HCT, PLT    Allergies  Allergies   Allergen Reactions   • Codeine      GI UPSET       Current Medications    Current Outpatient Medications:   •  Acetaminophen (TYLENOL 8 HOUR PO), Take  by mouth As Needed., Disp: , Rfl:   •  albuterol (PROVENTIL HFA;VENTOLIN HFA) 108 (90 BASE) MCG/ACT inhaler, Inhale 2 puffs every 4 (four) hours as needed for wheezing., Disp: , Rfl:   •  albuterol (PROVENTIL) (2.5 MG/3ML) 0.083% nebulizer solution, Take 2.5 mg by nebulization Daily As Needed., Disp: , Rfl: 2  •  apixaban (ELIQUIS) 5 MG tablet tablet, Take 1 tablet by mouth 2 (Two) Times a Day. (Stop aspirin when starting Eliquis), Disp: 60 tablet, Rfl: 5  •  atorvastatin (LIPITOR) 10 MG tablet, Take 1 tablet by mouth Every Night., Disp: 30 tablet, Rfl: 11  •  Budeson-Glycopyrrol-Formoterol (Breztri Aerosphere) 160-9-4.8 MCG/ACT aerosol inhaler, Inhale 2 puffs 2 (Two) Times a Day., Disp: , Rfl:   •  cefdinir (OMNICEF) 300 MG capsule, Take 300 mg by mouth 2 (Two) Times a Day., Disp: , Rfl:   •  famotidine (PEPCID) 20 MG tablet, Take 1 tablet by mouth Daily., Disp: , Rfl:   •  furosemide (LASIX) 20 MG tablet, Take 1 tablet by mouth As Needed (edema)., Disp: 30 tablet, Rfl: 11  •  isosorbide dinitrate (ISORDIL) 20 MG tablet, TAKE 2 TABS DAILY, Disp: 180 tablet, Rfl: 3  •  metoprolol succinate XL (TOPROL-XL) 25 MG 24 hr tablet, Take 1 tablet by mouth Daily.,  Disp: 90 tablet, Rfl: 3  •  nitroglycerin (NITROSTAT) 0.4 MG SL tablet, Place 1 tablet under the tongue Every 5 (Five) Minutes As Needed for Chest Pain. Take no more than 3 doses in 15 minutes., Disp: 30 tablet, Rfl: 3  •  O2 (OXYGEN), Inhale 3 L/min Daily. Pt uses oxygen at home only , Disp: , Rfl:   •  tamsulosin (FLOMAX) 0.4 MG capsule 24 hr capsule, Take 1 capsule by mouth Every Night., Disp: , Rfl:           ROS  Review of Systems   Cardiovascular: Positive for dyspnea on exertion, orthopnea and paroxysmal nocturnal dyspnea. Negative for irregular heartbeat.   Respiratory: Positive for cough, shortness of breath, sleep disturbances due to breathing and sputum production. Negative for snoring and wheezing.    Neurological: Positive for dizziness.       SOCIAL HX  Social History     Socioeconomic History   • Marital status:      Spouse name: Not on file   • Number of children: Not on file   • Years of education: Not on file   • Highest education level: Not on file   Tobacco Use   • Smoking status: Former Smoker     Packs/day: 1.00     Types: Cigarettes     Quit date: 2016     Years since quittin.7   • Smokeless tobacco: Never Used   Substance and Sexual Activity   • Alcohol use: No   • Drug use: No   • Sexual activity: Defer       FAMILY HX  History reviewed. No pertinent family history.          Vinayak Felix III, MD, FACC

## 2021-08-12 ENCOUNTER — HOSPITAL ENCOUNTER (OUTPATIENT)
Dept: NON INVASIVE DIAGNOSTICS | Facility: HOSPITAL | Age: 71
Discharge: HOME | End: 2021-08-12
Payer: MEDICARE

## 2021-08-12 ENCOUNTER — TELEPHONE (OUTPATIENT)
Dept: CARDIOLOGY | Facility: CLINIC | Age: 71
End: 2021-08-12

## 2021-08-12 ENCOUNTER — OUTSIDE FACILITY SERVICE (OUTPATIENT)
Dept: CARDIOLOGY | Facility: CLINIC | Age: 71
End: 2021-08-12

## 2021-08-12 LAB
LV EF: 35 %
LVEF MODALITY: NORMAL

## 2021-08-12 PROCEDURE — 78452 HT MUSCLE IMAGE SPECT MULT: CPT | Performed by: INTERNAL MEDICINE

## 2021-08-12 PROCEDURE — 3430000000 HC RX DIAGNOSTIC RADIOPHARMACEUTICAL: Performed by: INTERNAL MEDICINE

## 2021-08-12 PROCEDURE — 93018 CV STRESS TEST I&R ONLY: CPT | Performed by: INTERNAL MEDICINE

## 2021-08-12 PROCEDURE — 6360000002 HC RX W HCPCS: Performed by: INTERNAL MEDICINE

## 2021-08-12 PROCEDURE — 93017 CV STRESS TEST TRACING ONLY: CPT

## 2021-08-12 PROCEDURE — A9500 TC99M SESTAMIBI: HCPCS | Performed by: INTERNAL MEDICINE

## 2021-08-12 PROCEDURE — 93306 TTE W/DOPPLER COMPLETE: CPT

## 2021-08-12 PROCEDURE — 96374 THER/PROPH/DIAG INJ IV PUSH: CPT

## 2021-08-12 PROCEDURE — 78452 HT MUSCLE IMAGE SPECT MULT: CPT

## 2021-08-12 RX ADMIN — TETRAKIS(2-METHOXYISOBUTYLISOCYANIDE)COPPER(I) TETRAFLUOROBORATE 29.7 MILLICURIE: 1 INJECTION, POWDER, LYOPHILIZED, FOR SOLUTION INTRAVENOUS at 11:36

## 2021-08-12 RX ADMIN — TETRAKIS(2-METHOXYISOBUTYLISOCYANIDE)COPPER(I) TETRAFLUOROBORATE 10.15 MILLICURIE: 1 INJECTION, POWDER, LYOPHILIZED, FOR SOLUTION INTRAVENOUS at 09:16

## 2021-08-12 RX ADMIN — REGADENOSON 0.4 MG: 0.08 INJECTION, SOLUTION INTRAVENOUS at 10:46

## 2021-08-12 NOTE — TELEPHONE ENCOUNTER
----- Message from Vinayak Felix III, MD sent at 8/12/2021  1:35 PM EDT -----  Stress test revealed evidence of a previous heart attack but nothing concerning for new blockage.  Overall heart function is moderately depressed.  We will discuss further at follow-up.

## 2021-08-27 ENCOUNTER — TELEPHONE (OUTPATIENT)
Dept: CARDIOLOGY | Facility: CLINIC | Age: 71
End: 2021-08-27

## 2021-08-27 NOTE — TELEPHONE ENCOUNTER
Called Mr Luna to verify he is taking Eliquis.  Office note and medication list were conflicting.  Mrs Luna verified he is taking Eliquis.

## 2021-09-01 ENCOUNTER — APPOINTMENT (OUTPATIENT)
Dept: GENERAL RADIOLOGY | Facility: HOSPITAL | Age: 71
End: 2021-09-01

## 2021-09-01 ENCOUNTER — APPOINTMENT (OUTPATIENT)
Dept: CT IMAGING | Facility: HOSPITAL | Age: 71
End: 2021-09-01

## 2021-09-01 ENCOUNTER — HOSPITAL ENCOUNTER (INPATIENT)
Facility: HOSPITAL | Age: 71
LOS: 5 days | Discharge: HOME | End: 2021-09-06
Attending: EMERGENCY MEDICINE | Admitting: INTERNAL MEDICINE

## 2021-09-01 DIAGNOSIS — U07.1 COVID-19: Primary | ICD-10-CM

## 2021-09-01 DIAGNOSIS — E87.1 HYPONATREMIA: ICD-10-CM

## 2021-09-01 DIAGNOSIS — Z99.81 CHRONIC RESPIRATORY FAILURE WITH HYPOXIA, ON HOME OXYGEN THERAPY (HCC): ICD-10-CM

## 2021-09-01 DIAGNOSIS — J44.1 COPD WITH ACUTE EXACERBATION (HCC): ICD-10-CM

## 2021-09-01 DIAGNOSIS — J96.11 CHRONIC RESPIRATORY FAILURE WITH HYPOXIA, ON HOME OXYGEN THERAPY (HCC): ICD-10-CM

## 2021-09-01 DIAGNOSIS — Z72.0 TOBACCO USE: ICD-10-CM

## 2021-09-01 DIAGNOSIS — J60 BLACK LUNG DISEASE (HCC): ICD-10-CM

## 2021-09-01 PROBLEM — I50.20 HEART FAILURE WITH REDUCED EJECTION FRACTION (HCC): Status: ACTIVE | Noted: 2021-09-01

## 2021-09-01 LAB
ALBUMIN SERPL-MCNC: 3.6 G/DL (ref 3.5–5.2)
ALBUMIN/GLOB SERPL: 1.1 G/DL
ALP SERPL-CCNC: 100 U/L (ref 39–117)
ALT SERPL W P-5'-P-CCNC: 36 U/L (ref 1–41)
ANION GAP SERPL CALCULATED.3IONS-SCNC: 12 MMOL/L (ref 5–15)
ANION GAP SERPL CALCULATED.3IONS-SCNC: 14 MMOL/L (ref 5–15)
ARTERIAL PATENCY WRIST A: ABNORMAL
AST SERPL-CCNC: 63 U/L (ref 1–40)
ATMOSPHERIC PRESS: ABNORMAL MM[HG]
BASE EXCESS BLDA CALC-SCNC: -3.2 MMOL/L (ref 0–2)
BASOPHILS # BLD AUTO: 0.01 10*3/MM3 (ref 0–0.2)
BASOPHILS NFR BLD AUTO: 0.2 % (ref 0–1.5)
BDY SITE: ABNORMAL
BILIRUB SERPL-MCNC: 1.1 MG/DL (ref 0–1.2)
BODY TEMPERATURE: 37 C
BUN SERPL-MCNC: 13 MG/DL (ref 8–23)
BUN SERPL-MCNC: 14 MG/DL (ref 8–23)
BUN/CREAT SERPL: 10.9 (ref 7–25)
BUN/CREAT SERPL: 11.8 (ref 7–25)
CALCIUM SPEC-SCNC: 8.1 MG/DL (ref 8.6–10.5)
CALCIUM SPEC-SCNC: 8.3 MG/DL (ref 8.6–10.5)
CHLORIDE SERPL-SCNC: 83 MMOL/L (ref 98–107)
CHLORIDE SERPL-SCNC: 84 MMOL/L (ref 98–107)
CO2 BLDA-SCNC: 20.9 MMOL/L (ref 22–33)
CO2 SERPL-SCNC: 18 MMOL/L (ref 22–29)
CO2 SERPL-SCNC: 20 MMOL/L (ref 22–29)
COHGB MFR BLD: 0.7 % (ref 0–2)
CORTIS SERPL-MCNC: 28.88 MCG/DL
CREAT SERPL-MCNC: 1.19 MG/DL (ref 0.76–1.27)
CREAT SERPL-MCNC: 1.19 MG/DL (ref 0.76–1.27)
CRP SERPL-MCNC: 0.76 MG/DL (ref 0–0.5)
CRP SERPL-MCNC: 0.87 MG/DL (ref 0–0.5)
D DIMER PPP FEU-MCNC: 0.87 MCGFEU/ML (ref 0–0.56)
D-LACTATE SERPL-SCNC: 1.4 MMOL/L (ref 0.5–2)
DEPRECATED RDW RBC AUTO: 42.5 FL (ref 37–54)
EOSINOPHIL # BLD AUTO: 0 10*3/MM3 (ref 0–0.4)
EOSINOPHIL NFR BLD AUTO: 0 % (ref 0.3–6.2)
EPAP: 0
ERYTHROCYTE [DISTWIDTH] IN BLOOD BY AUTOMATED COUNT: 13.1 % (ref 12.3–15.4)
FERRITIN SERPL-MCNC: 1057 NG/ML (ref 30–400)
FERRITIN SERPL-MCNC: 1078 NG/ML (ref 30–400)
FIBRINOGEN PPP-MCNC: 419 MG/DL (ref 220–470)
FLUAV SUBTYP SPEC NAA+PROBE: NOT DETECTED
FLUBV RNA ISLT QL NAA+PROBE: NOT DETECTED
GFR SERPL CREATININE-BSD FRML MDRD: 60 ML/MIN/1.73
GFR SERPL CREATININE-BSD FRML MDRD: 60 ML/MIN/1.73
GLOBULIN UR ELPH-MCNC: 3.3 GM/DL
GLUCOSE SERPL-MCNC: 100 MG/DL (ref 65–99)
GLUCOSE SERPL-MCNC: 153 MG/DL (ref 65–99)
HBA1C MFR BLD: 5.9 % (ref 4.8–5.6)
HCO3 BLDA-SCNC: 20 MMOL/L (ref 20–26)
HCT VFR BLD AUTO: 38.7 % (ref 37.5–51)
HCT VFR BLD CALC: 42 %
HGB BLD-MCNC: 13.3 G/DL (ref 13–17.7)
HGB BLDA-MCNC: 13.7 G/DL (ref 13.5–17.5)
HOLD SPECIMEN: NORMAL
IMM GRANULOCYTES # BLD AUTO: 0.02 10*3/MM3 (ref 0–0.05)
IMM GRANULOCYTES NFR BLD AUTO: 0.3 % (ref 0–0.5)
INHALED O2 CONCENTRATION: 32 %
IPAP: 0
LDH SERPL-CCNC: 384 U/L (ref 135–225)
LDH SERPL-CCNC: 404 U/L (ref 135–225)
LYMPHOCYTES # BLD AUTO: 1.42 10*3/MM3 (ref 0.7–3.1)
LYMPHOCYTES NFR BLD AUTO: 21.7 % (ref 19.6–45.3)
MAGNESIUM SERPL-MCNC: 1.8 MG/DL (ref 1.6–2.4)
MCH RBC QN AUTO: 30.5 PG (ref 26.6–33)
MCHC RBC AUTO-ENTMCNC: 34.4 G/DL (ref 31.5–35.7)
MCV RBC AUTO: 88.8 FL (ref 79–97)
METHGB BLD QL: 0.3 % (ref 0–1.5)
MODALITY: ABNORMAL
MONOCYTES # BLD AUTO: 0.41 10*3/MM3 (ref 0.1–0.9)
MONOCYTES NFR BLD AUTO: 6.3 % (ref 5–12)
NEUTROPHILS NFR BLD AUTO: 4.67 10*3/MM3 (ref 1.7–7)
NEUTROPHILS NFR BLD AUTO: 71.5 % (ref 42.7–76)
NOTE: ABNORMAL
NRBC BLD AUTO-RTO: 0 /100 WBC (ref 0–0.2)
NT-PROBNP SERPL-MCNC: 2959 PG/ML (ref 0–900)
OSMOLALITY UR: 220 MOSM/KG (ref 300–1100)
OXYHGB MFR BLDV: 88.2 % (ref 94–99)
PAW @ PEAK INSP FLOW SETTING VENT: 0 CMH2O
PCO2 BLDA: 29.9 MM HG (ref 35–45)
PCO2 TEMP ADJ BLD: 29.9 MM HG (ref 35–48)
PH BLDA: 7.43 PH UNITS (ref 7.35–7.45)
PH, TEMP CORRECTED: 7.43 PH UNITS
PLATELET # BLD AUTO: 181 10*3/MM3 (ref 140–450)
PMV BLD AUTO: 10.2 FL (ref 6–12)
PO2 BLDA: 54.7 MM HG (ref 83–108)
PO2 TEMP ADJ BLD: 54.7 MM HG (ref 83–108)
POTASSIUM SERPL-SCNC: 3.5 MMOL/L (ref 3.5–5.2)
POTASSIUM SERPL-SCNC: 3.8 MMOL/L (ref 3.5–5.2)
PROCALCITONIN SERPL-MCNC: 0.1 NG/ML (ref 0–0.25)
PROT SERPL-MCNC: 6.9 G/DL (ref 6–8.5)
QT INTERVAL: 458 MS
QTC INTERVAL: 557 MS
RBC # BLD AUTO: 4.36 10*6/MM3 (ref 4.14–5.8)
SARS-COV-2 RNA PNL SPEC NAA+PROBE: DETECTED
SODIUM SERPL-SCNC: 115 MMOL/L (ref 136–145)
SODIUM SERPL-SCNC: 116 MMOL/L (ref 136–145)
SODIUM UR-SCNC: 57 MMOL/L
TOTAL RATE: 0 BREATHS/MINUTE
TROPONIN T SERPL-MCNC: <0.01 NG/ML (ref 0–0.03)
TSH SERPL DL<=0.05 MIU/L-ACNC: 1.26 UIU/ML (ref 0.27–4.2)
WBC # BLD AUTO: 6.53 10*3/MM3 (ref 3.4–10.8)
WHOLE BLOOD HOLD SPECIMEN: NORMAL
WHOLE BLOOD HOLD SPECIMEN: NORMAL

## 2021-09-01 PROCEDURE — 71250 CT THORAX DX C-: CPT

## 2021-09-01 PROCEDURE — 83520 IMMUNOASSAY QUANT NOS NONAB: CPT | Performed by: INTERNAL MEDICINE

## 2021-09-01 PROCEDURE — 80053 COMPREHEN METABOLIC PANEL: CPT | Performed by: EMERGENCY MEDICINE

## 2021-09-01 PROCEDURE — 83036 HEMOGLOBIN GLYCOSYLATED A1C: CPT | Performed by: INTERNAL MEDICINE

## 2021-09-01 PROCEDURE — 86140 C-REACTIVE PROTEIN: CPT | Performed by: INTERNAL MEDICINE

## 2021-09-01 PROCEDURE — 83935 ASSAY OF URINE OSMOLALITY: CPT | Performed by: INTERNAL MEDICINE

## 2021-09-01 PROCEDURE — 80048 BASIC METABOLIC PNL TOTAL CA: CPT | Performed by: INTERNAL MEDICINE

## 2021-09-01 PROCEDURE — 85384 FIBRINOGEN ACTIVITY: CPT | Performed by: INTERNAL MEDICINE

## 2021-09-01 PROCEDURE — 84484 ASSAY OF TROPONIN QUANT: CPT | Performed by: EMERGENCY MEDICINE

## 2021-09-01 PROCEDURE — 94799 UNLISTED PULMONARY SVC/PX: CPT

## 2021-09-01 PROCEDURE — 83615 LACTATE (LD) (LDH) ENZYME: CPT | Performed by: INTERNAL MEDICINE

## 2021-09-01 PROCEDURE — 84145 PROCALCITONIN (PCT): CPT | Performed by: EMERGENCY MEDICINE

## 2021-09-01 PROCEDURE — 83605 ASSAY OF LACTIC ACID: CPT | Performed by: EMERGENCY MEDICINE

## 2021-09-01 PROCEDURE — 82533 TOTAL CORTISOL: CPT | Performed by: INTERNAL MEDICINE

## 2021-09-01 PROCEDURE — XW033E5 INTRODUCTION OF REMDESIVIR ANTI-INFECTIVE INTO PERIPHERAL VEIN, PERCUTANEOUS APPROACH, NEW TECHNOLOGY GROUP 5: ICD-10-PCS | Performed by: INTERNAL MEDICINE

## 2021-09-01 PROCEDURE — 84300 ASSAY OF URINE SODIUM: CPT | Performed by: INTERNAL MEDICINE

## 2021-09-01 PROCEDURE — 83735 ASSAY OF MAGNESIUM: CPT | Performed by: INTERNAL MEDICINE

## 2021-09-01 PROCEDURE — 84443 ASSAY THYROID STIM HORMONE: CPT | Performed by: INTERNAL MEDICINE

## 2021-09-01 PROCEDURE — 36600 WITHDRAWAL OF ARTERIAL BLOOD: CPT

## 2021-09-01 PROCEDURE — 25010000002 METHYLPREDNISOLONE PER 40 MG: Performed by: EMERGENCY MEDICINE

## 2021-09-01 PROCEDURE — 99291 CRITICAL CARE FIRST HOUR: CPT | Performed by: INTERNAL MEDICINE

## 2021-09-01 PROCEDURE — 99284 EMERGENCY DEPT VISIT MOD MDM: CPT

## 2021-09-01 PROCEDURE — 85379 FIBRIN DEGRADATION QUANT: CPT | Performed by: INTERNAL MEDICINE

## 2021-09-01 PROCEDURE — 86140 C-REACTIVE PROTEIN: CPT | Performed by: EMERGENCY MEDICINE

## 2021-09-01 PROCEDURE — 82728 ASSAY OF FERRITIN: CPT | Performed by: EMERGENCY MEDICINE

## 2021-09-01 PROCEDURE — 71045 X-RAY EXAM CHEST 1 VIEW: CPT

## 2021-09-01 PROCEDURE — 83880 ASSAY OF NATRIURETIC PEPTIDE: CPT | Performed by: EMERGENCY MEDICINE

## 2021-09-01 PROCEDURE — 85025 COMPLETE CBC W/AUTO DIFF WBC: CPT | Performed by: EMERGENCY MEDICINE

## 2021-09-01 PROCEDURE — 83930 ASSAY OF BLOOD OSMOLALITY: CPT | Performed by: INTERNAL MEDICINE

## 2021-09-01 PROCEDURE — 94640 AIRWAY INHALATION TREATMENT: CPT

## 2021-09-01 PROCEDURE — 82805 BLOOD GASES W/O2 SATURATION: CPT

## 2021-09-01 PROCEDURE — 82375 ASSAY CARBOXYHB QUANT: CPT

## 2021-09-01 PROCEDURE — 83050 HGB METHEMOGLOBIN QUAN: CPT

## 2021-09-01 PROCEDURE — 87636 SARSCOV2 & INF A&B AMP PRB: CPT | Performed by: EMERGENCY MEDICINE

## 2021-09-01 PROCEDURE — 93005 ELECTROCARDIOGRAM TRACING: CPT | Performed by: EMERGENCY MEDICINE

## 2021-09-01 PROCEDURE — 83615 LACTATE (LD) (LDH) ENZYME: CPT | Performed by: EMERGENCY MEDICINE

## 2021-09-01 PROCEDURE — 82728 ASSAY OF FERRITIN: CPT | Performed by: INTERNAL MEDICINE

## 2021-09-01 RX ORDER — METHYLPREDNISOLONE SODIUM SUCCINATE 40 MG/ML
40 INJECTION, POWDER, LYOPHILIZED, FOR SOLUTION INTRAMUSCULAR; INTRAVENOUS ONCE
Status: COMPLETED | OUTPATIENT
Start: 2021-09-01 | End: 2021-09-01

## 2021-09-01 RX ORDER — TRAZODONE HYDROCHLORIDE 50 MG/1
50 TABLET ORAL NIGHTLY
COMMUNITY
End: 2021-01-01

## 2021-09-01 RX ORDER — ONDANSETRON 2 MG/ML
4 INJECTION INTRAMUSCULAR; INTRAVENOUS EVERY 6 HOURS PRN
Status: DISCONTINUED | OUTPATIENT
Start: 2021-09-01 | End: 2021-09-06 | Stop reason: HOSPADM

## 2021-09-01 RX ORDER — 3% SODIUM CHLORIDE 3 G/100ML
100 INJECTION, SOLUTION INTRAVENOUS ONCE
Status: DISCONTINUED | OUTPATIENT
Start: 2021-09-01 | End: 2021-09-01

## 2021-09-01 RX ORDER — TOLVAPTAN 15 MG/1
7.5 TABLET ORAL ONCE
Status: COMPLETED | OUTPATIENT
Start: 2021-09-01 | End: 2021-09-01

## 2021-09-01 RX ORDER — TAMSULOSIN HYDROCHLORIDE 0.4 MG/1
0.4 CAPSULE ORAL NIGHTLY
Status: DISCONTINUED | OUTPATIENT
Start: 2021-09-01 | End: 2021-09-06 | Stop reason: HOSPADM

## 2021-09-01 RX ORDER — NITROGLYCERIN 0.4 MG/1
0.4 TABLET SUBLINGUAL
Status: DISCONTINUED | OUTPATIENT
Start: 2021-09-01 | End: 2021-09-06 | Stop reason: HOSPADM

## 2021-09-01 RX ORDER — METOPROLOL SUCCINATE 25 MG/1
25 TABLET, EXTENDED RELEASE ORAL DAILY
Status: DISCONTINUED | OUTPATIENT
Start: 2021-09-02 | End: 2021-09-06 | Stop reason: HOSPADM

## 2021-09-01 RX ORDER — ATORVASTATIN CALCIUM 10 MG/1
10 TABLET, FILM COATED ORAL NIGHTLY
Status: DISCONTINUED | OUTPATIENT
Start: 2021-09-01 | End: 2021-09-06 | Stop reason: HOSPADM

## 2021-09-01 RX ORDER — DEXAMETHASONE SODIUM PHOSPHATE 4 MG/ML
6 INJECTION, SOLUTION INTRA-ARTICULAR; INTRALESIONAL; INTRAMUSCULAR; INTRAVENOUS; SOFT TISSUE DAILY
Status: DISCONTINUED | OUTPATIENT
Start: 2021-09-02 | End: 2021-09-03

## 2021-09-01 RX ORDER — ALBUTEROL SULFATE 90 UG/1
2 AEROSOL, METERED RESPIRATORY (INHALATION) ONCE
Status: COMPLETED | OUTPATIENT
Start: 2021-09-01 | End: 2021-09-01

## 2021-09-01 RX ORDER — SODIUM CHLORIDE 0.9 % (FLUSH) 0.9 %
10 SYRINGE (ML) INJECTION AS NEEDED
Status: DISCONTINUED | OUTPATIENT
Start: 2021-09-01 | End: 2021-09-06 | Stop reason: HOSPADM

## 2021-09-01 RX ORDER — ACETAMINOPHEN 325 MG/1
650 TABLET ORAL EVERY 4 HOURS PRN
Status: DISCONTINUED | OUTPATIENT
Start: 2021-09-01 | End: 2021-09-06 | Stop reason: HOSPADM

## 2021-09-01 RX ORDER — ISOSORBIDE DINITRATE 20 MG/1
20 TABLET ORAL
Status: DISCONTINUED | OUTPATIENT
Start: 2021-09-01 | End: 2021-09-06 | Stop reason: HOSPADM

## 2021-09-01 RX ORDER — ALBUTEROL SULFATE 90 UG/1
2 AEROSOL, METERED RESPIRATORY (INHALATION) EVERY 4 HOURS PRN
Status: DISCONTINUED | OUTPATIENT
Start: 2021-09-01 | End: 2021-09-06 | Stop reason: HOSPADM

## 2021-09-01 RX ORDER — BUMETANIDE 0.25 MG/ML
2.5 INJECTION INTRAMUSCULAR; INTRAVENOUS ONCE
Status: COMPLETED | OUTPATIENT
Start: 2021-09-01 | End: 2021-09-01

## 2021-09-01 RX ORDER — FAMOTIDINE 20 MG/1
20 TABLET, FILM COATED ORAL DAILY
Status: DISCONTINUED | OUTPATIENT
Start: 2021-09-02 | End: 2021-09-05

## 2021-09-01 RX ORDER — POLYETHYLENE GLYCOL 3350 17 G/17G
17 POWDER, FOR SOLUTION ORAL DAILY PRN
Status: ON HOLD | COMMUNITY
End: 2022-01-01

## 2021-09-01 RX ADMIN — METHYLPREDNISOLONE SODIUM SUCCINATE 40 MG: 40 INJECTION, POWDER, FOR SOLUTION INTRAMUSCULAR; INTRAVENOUS at 13:10

## 2021-09-01 RX ADMIN — TOLVAPTAN 7.5 MG: 15 TABLET ORAL at 20:28

## 2021-09-01 RX ADMIN — ATORVASTATIN CALCIUM 10 MG: 10 TABLET, FILM COATED ORAL at 20:29

## 2021-09-01 RX ADMIN — ISOSORBIDE DINITRATE 20 MG: 20 TABLET ORAL at 20:28

## 2021-09-01 RX ADMIN — BUMETANIDE 2.5 MG: 0.25 INJECTION, SOLUTION INTRAMUSCULAR; INTRAVENOUS at 15:11

## 2021-09-01 RX ADMIN — REMDESIVIR 200 MG: 100 INJECTION, POWDER, LYOPHILIZED, FOR SOLUTION INTRAVENOUS at 20:26

## 2021-09-01 RX ADMIN — APIXABAN 5 MG: 5 TABLET, FILM COATED ORAL at 20:29

## 2021-09-01 RX ADMIN — ALBUTEROL SULFATE 2 PUFF: 90 AEROSOL, METERED RESPIRATORY (INHALATION) at 12:35

## 2021-09-01 RX ADMIN — TAMSULOSIN HYDROCHLORIDE 0.4 MG: 0.4 CAPSULE ORAL at 20:29

## 2021-09-01 NOTE — H&P
INTENSIVIST   HOSPITAL VISIT NOTE     Hospital:  LOS: 0 days     Subjective   S     Mr. Morgan Luna, 71 y.o. male is followed for:Shortness of Breath       C-19 Pneumonitis  CHF    Heart failure with reduced ejection fraction (CMS/[facility])    As an Intensivist, we provide an integrated approach to the ICU patient and family, medical management of comorbid conditions, including but not limited to electrolytes, glycemic control, organ dysfunction, lead interdisciplinary rounds and coordinate the care with all other services, including those from other specialists.     HPI:  Morgan is a 71 y.o. male, who presented to this Hospital on 9/1/2021 because of diarrhea and increasing dyspnea.    Diarrhea (watery) has been present for about 2 weeks.    His BP has been running low.    Poor apetite. Not able to eat much over past few days. He has been drinking water and Pepsi.    SpO2 has been running between 89-92% over past 2-3 days.    His wife is also positive for SARS-CoV-2.    He is fully vaccinated (Pfizer), last dose March 2021.    Upon evaluation in the ED he was found to be hyponatremic and for that reason we were asked to admit him to the ICU..        PMH: He  has a past medical history of Acid reflux, Anxiety, Anxiety, Black lung disease (CMS/[facility]), COPD (chronic obstructive pulmonary disease) (CMS/[facility]), Current every day smoker, Hypercholesteremia, Hypertension, Ischemic cardiomyopathy (01/15/2015), and Presence of combination internal cardiac defibrillator (ICD) and pacemaker.   PSxH: He  has a past surgical history that includes Coronary artery bypass graft; Back surgery; and Other surgical history (2015).     Medications:  No current facility-administered medications on file prior to encounter.     Current Outpatient Medications on File Prior to Encounter   Medication Sig   • acetaminophen (Tylenol 8 Hour) 650 MG 8 hr tablet Take 1-2 tablets by mouth As Needed.   • albuterol (PROVENTIL HFA;VENTOLIN HFA) 108 (90 BASE)  MCG/ACT inhaler Inhale 2 puffs Every 4 (Four) Hours As Needed for Wheezing or Shortness of Air.   • albuterol (PROVENTIL) (2.5 MG/3ML) 0.083% nebulizer solution Take 2.5 mg by nebulization Daily As Needed for Wheezing or Shortness of Air.   • apixaban (ELIQUIS) 5 MG tablet tablet Take 1 tablet by mouth 2 (Two) Times a Day. (Stop aspirin when starting Eliquis)   • atorvastatin (LIPITOR) 10 MG tablet Take 1 tablet by mouth Every Night.   • Budeson-Glycopyrrol-Formoterol (Breztri Aerosphere) 160-9-4.8 MCG/ACT aerosol inhaler Inhale 2 puffs 2 (Two) Times a Day.   • famotidine (PEPCID) 20 MG tablet Take 1 tablet by mouth Daily.   • furosemide (LASIX) 20 MG tablet Take 1 tablet by mouth As Needed (edema). (Patient taking differently: Take 20 mg by mouth Daily As Needed (edema).)   • isosorbide dinitrate (ISORDIL) 20 MG tablet TAKE 2 TABS DAILY   • metoprolol succinate XL (TOPROL-XL) 25 MG 24 hr tablet Take 1 tablet by mouth Daily.   • polyethylene glycol (MIRALAX) 17 g packet Take 17 g by mouth Daily As Needed.   • tamsulosin (FLOMAX) 0.4 MG capsule 24 hr capsule Take 1 capsule by mouth Every Night.   • traZODone (DESYREL) 50 MG tablet Take 50 mg by mouth Every Night.   • nitroglycerin (NITROSTAT) 0.4 MG SL tablet Place 1 tablet under the tongue Every 5 (Five) Minutes As Needed for Chest Pain. Take no more than 3 doses in 15 minutes.   • [DISCONTINUED] cefdinir (OMNICEF) 300 MG capsule Take 300 mg by mouth 2 (Two) Times a Day.   • [DISCONTINUED] O2 (OXYGEN) Inhale 3 L/min Daily. Pt uses oxygen at home only         Allergies: He is allergic to codeine.   FH: His family history is not on file.   SH: He  reports that he quit smoking about 4 years ago. His smoking use included cigarettes. He smoked 1.00 pack per day. He has never used smokeless tobacco. He reports that he does not drink alcohol and does not use drugs.     The patient's relevant past medical, surgical and social history were reviewed and updated in Epic as  appropriate.        History     Last Reviewed by Baldo Cohn MD on 9/1/2021 at  2:07 PM    Sections Reviewed    Medical, Family, Surgical, Tobacco, Alcohol, Drug Use, Sexual Activity,   Social Documentation      Problem list reviewed by Baldo Cohn MD on 9/1/2021 at  2:07 PM  Allergies reviewed by Baldo Cohn MD on 9/1/2021 at  2:07 PM       Review of Systems    Constitutional: Positive for appetite change.   HENT: Negative for rhinorrhea.    Eyes: Negative.    Respiratory: Positive for cough and shortness of breath.    Gastrointestinal: Positive for diarrhea. Negative for abdominal pain, anal bleeding, blood in stool, constipation, nausea, rectal pain and vomiting.   Genitourinary: Positive for decreased urine volume. Negative for difficulty urinating, dysuria, enuresis, frequency, hematuria and urgency.   Musculoskeletal: Negative for back pain and myalgias.   Neurological: Negative for light-headedness and headaches.   Hematological: Negative.   Other systems reviewed and they are negative.    Objective   O     Vitals:  Temp: 98.7 °F (37.1 °C) (09/01/21 1200) Temp  Min: 98.7 °F (37.1 °C)  Max: 98.7 °F (37.1 °C)   Temp core:      BP: 129/76 (09/01/21 1300) BP  Min: 114/68  Max: 129/76   Pulse: 85 (09/01/21 1300) Pulse  Min: 85  Max: 87   Resp: 22 (09/01/21 1200) Resp  Min: 22  Max: 22   SpO2: 94 % (09/01/21 1300) SpO2  Min: 93 %  Max: 94 %   Device: nasal cannula (09/01/21 1235)    Flow Rate: 3 (09/01/21 1235) Flow (L/min)  Min: 3  Max: 3     Intake/Ouptut 24 hrs (7:00AM - 6:59 AM)  No intake or output data in the 24 hours ending 09/01/21 1939       Physical Examination    Telemetry:            Constitutional:  No acute distress.   Head: Normocephalic.   ENMT: No oral lesions.   Neck: No adenopathy. Supple.  Trachea midline.   Cardiovascular: RRR.   Normal heart sounds.  No murmurs, gallop or rub.   Respiratory: Normal breath sounds  Crackles.   Abdominal:  Soft with no tenderness.  No distension.   No  HSM.   Extremities: Warm.  Dry.  No cyanosis.  Edema   Neurological/Psych:   Alert, Oriented, Cooperative.  Best Eye Response: 4-->(E4) spontaneous (09/01/21 1230)  Best Motor Response: 6-->(M6) obeys commands (09/01/21 1230)  Best Verbal Response: 5-->(V5) oriented (09/01/21 1230)  Lyman Coma Scale Score: 15 (09/01/21 1230)     Results Reviewed:  Laboratory  Microbiology  Radiology  Pathology    Hematology:  Results from last 7 days   Lab Units 09/01/21  1235   WBC 10*3/mm3 6.53   HEMOGLOBIN g/dL 13.3   PLATELETS 10*3/mm3 181   NEUTROS ABS 10*3/mm3 4.67   LYMPHS ABS 10*3/mm3 1.42     Chemistry:  Estimated Creatinine Clearance: 71.2 mL/min (by C-G formula based on SCr of 1.19 mg/dL).  Results from last 7 days   Lab Units 09/01/21  1737 09/01/21  1235   SODIUM mmol/L 115* 116*   POTASSIUM mmol/L 3.5 3.8   CHLORIDE mmol/L 83* 84*   CO2 mmol/L 18.0* 20.0*   BUN mg/dL 14 13   CREATININE mg/dL 1.19 1.19   GLUCOSE mg/dL 153* 100*     Results from last 7 days   Lab Units 09/01/21  1737 09/01/21  1235   CALCIUM mg/dL 8.1* 8.3*   MAGNESIUM mg/dL  --  1.8     Results from last 7 days   Lab Units 09/01/21  1235   AST (SGOT) U/L 63*   ALT (SGPT) U/L 36   BILIRUBIN mg/dL 1.1   ALK PHOS U/L 100     Coagulation Labs:  Results from last 7 days   Lab Units 09/01/21  1235   FIBRINOGEN mg/dL 419      Cardiac Labs:  Results from last 7 days   Lab Units 09/01/21  1235   PROBNP pg/mL 2,959.0*   TROPONIN T ng/mL <0.010     Biomarkers:  Results from last 7 days   Lab Units 09/01/21  1235   CRP mg/dL 0.76*   PROCALCITONIN ng/mL 0.10   LACTATE mmol/L 1.4   LDH U/L 384*   D DIMER QUANT MCGFEU/mL 0.87*   FERRITIN ng/mL 1,057.00*       Interleukin:      COVID-19  Lab Results   Lab Value Date/Time    COVID19 Detected (C) 09/01/2021 1239       Arterial Blood Gases:  Results from last 7 days   Lab Units 09/01/21  1255   PH, ARTERIAL pH units 7.432   PCO2, ARTERIAL mm Hg 29.9*   PO2 ART mm Hg 54.7*   FIO2 % 32       Images:  CT Chest Without  Contrast Diagnostic    Result Date: 2021  Patchy groundglass opacity seen with some interseptal thickening suggesting findings typical for Covid-19 pneumonia. No pleural effusion with reactive lymph nodes in the mediastinum and hilar regions.  D:  2021 E:  2021  This report was finalized on 2021 5:04 PM by Dr. Shawnee Brenner MD.      XR Chest 1 View    Result Date: 2021  Heart is enlarged with increased pulmonary vascularity bilaterally and increased markings superimposed in the periphery in the mid and lower lung field. Superimposed infiltrate is of concern. Clinical correlation needed.  D:  2021 E:  2021  This report was finalized on 2021 5:04 PM by Dr. Shawnee Brenner MD.        Echo:      Results: Reviewed.  I reviewed the patient's new laboratory and imaging results.  I independently reviewed the patient's new images.    Medications: Reviewed.    Assessment/Plan   A / P     Assessment:    Morgan, admitted on 2021 with increasing dyspnea:      Covid-19 Pneumonitis, Severe as SpO2 < 94% on ambient air.  Acute, respiratory failure, type 1   On 3 lpm nasal cannula on admission  Vaccinated against Covid, Pfizer, second dose: 2021  Treatment: DEX and RDV  Hyponatremia  R/O Secondary to CHF  Pulmonary  COPD  CWP  Former Tobacco use  Cardiac  CAD s/p CABG  HTN  Dyslipidemia  Dilated cardiomyopathy  S/P ICD  GERD  Diarrhea X 2 weeks PTA  Antibiotics: None  Obesity I. Body mass index is 33.23 kg/m².   At risk for DM (pre-diabetes) based on his HbA1c. [HbA1C 5.7%-6.4%, eA-139 mg/dL].         Lab Results   Lab Value Date/Time    HGBA1C 5.90 (H) 2021 1235       Advance Directives: There are no questions and answers to display.        Plan:    Steroids for Covid. He already got Solumedrol 40 mg today, we will continue with DEX tomorrow. Start RDV. Currently on nasal cannula.  If his oxygen requirements increase then we will consider IL 6 RA or JK  inhibitor.  Tolvaptan and monitor Na levels.  Urine osm and Padmaja.  Check TSH, Cortisol and A1c    Plan of care and goals reviewed during interdisciplinary rounds.  I discussed the patient's findings and my recommendations with patient and nursing staff    Level of Risk is High due to:  illness with threat to life or bodily function.     Time: was greater than 35 minutes.    (This excludes time spent performing separately reportable procedures and services).  Patient was at high risk of imminent or life-threatening deterioration in his condition due to Respiratory Failure and hyponatremia.     [x]  Primary Attending  []  Consultant

## 2021-09-01 NOTE — ED NOTES
Report given to Amanda LAU on 2A. Patient ready for transport with [facility].      Kamari Fonseca, JUDI  09/01/21 1933

## 2021-09-01 NOTE — CASE MANAGEMENT/SOCIAL WORK
Discharge Planning Assessment  [facility]     Patient Name: Morgan Luna  MRN: [id number]  Today's Date: 9/1/2021    Admit Date: 9/1/2021    Discharge Needs Assessment     Row Name 09/01/21 1419       Living Environment    Lives With  spouse    Name(s) of Who Lives With Patient  Cadence Luna (Spouse) [phone] (Mobile)    Current Living Arrangements  home/apartment/condo    Potentially Unsafe Housing Conditions  unable to assess    Primary Care Provided by  self    Provides Primary Care For  no one    Family Caregiver if Needed  spouse    Family Caregiver Names  Cadence Luna (Spouse) [phone] (Mobile)    Quality of Family Relationships  helpful;involved;supportive    Able to Return to Prior Arrangements  yes       Resource/Environmental Concerns    Resource/Environmental Concerns  none    Transportation Concerns  car, none       Transition Planning    Patient/Family Anticipates Transition to  home    Patient/Family Anticipated Services at Transition  none    Transportation Anticipated  family or friend will provide       Discharge Needs Assessment    Readmission Within the Last 30 Days  no previous admission in last 30 days    Equipment Currently Used at Home  oxygen    Concerns to be Addressed  denies needs/concerns at this time    Anticipated Changes Related to Illness  none    Equipment Needed After Discharge  none    Provided Post Acute Provider List?  N/A    N/A Provider List Comment  denies need for outpt services    Provided Post Acute Provider Quality & Resource List?  N/A        Discharge Plan     Row Name 09/01/21 1427       Plan    Plan  initial    Plan Comments  Pt lives in [facility] with his wife. He is mostly independent with his ADL. He uses oxygen, 24/7, 3L from [facility]. Hx includes Black Lung Dx, HTN, HF, COPD, and CAD. Plan is home per wife. PCP is Risa Jhaveri.    Final Discharge Disposition Code  30 - still a patient        Continued Care and Services - Admitted  Since 9/1/2021    Coordination has not been started for this encounter.         Demographic Summary    No documentation.       Functional Status     Row Name 09/01/21 1419       Functional Status    Usual Activity Tolerance  moderate       Functional Status, IADL    Medications  assistive person    Meal Preparation  assistive person    Housekeeping  assistive person    Laundry  assistive person    Shopping  assistive person       Mental Status    General Appearance WDL  WDL       Mental Status Summary    Recent Changes in Mental Status/Cognitive Functioning  unable to assess       Employment/    Employment Status  retired        Psychosocial    No documentation.       Abuse/Neglect    No documentation.       Legal    No documentation.       Substance Abuse    No documentation.       Patient Forms    No documentation.           Angelique Patel RN

## 2021-09-01 NOTE — ED PROVIDER NOTES
Subjective   Patient is a 71 yr old male complaining of diarrhea for 2 weeks. He describes it as black and watery. He has been unable to eat solid foods for the past 2-3 days and only limited fluid intake, some water and pepsi. He reports diarrhea 2-3 times per day with no pain. He states the onset is very sudden and several times he has not been able to make it to the bathroom on time. He states no alcohol or illicit drug use. He reports SOB and having trouble sleeping recently due to the SOB. Patient states he has been vaccinated against Covid-19 and received the second dose of Pfizer on March 4th. He denies chest pain, covid exposures, or recent covid tests. States the diarrhea is no worse today than it has been on other days but he got tired of the symptoms so came to the ER today. Patient states this is the first time he has seen someone for the diarrhea.       History provided by:  Patient      Review of Systems   Constitutional: Positive for appetite change.   HENT: Negative for rhinorrhea.    Eyes: Negative.    Respiratory: Positive for cough and shortness of breath.    Gastrointestinal: Positive for diarrhea. Negative for abdominal pain, anal bleeding, blood in stool, constipation, nausea, rectal pain and vomiting.   Genitourinary: Positive for decreased urine volume. Negative for difficulty urinating, dysuria, enuresis, frequency, hematuria and urgency.   Musculoskeletal: Negative for back pain and myalgias.   Neurological: Negative for light-headedness and headaches.   Hematological: Negative.    All other systems reviewed and are negative.      Past Medical History:   Diagnosis Date   • Acid reflux    • Anxiety    • Anxiety    • Black lung disease (CMS/[facility])    • COPD (chronic obstructive pulmonary disease) (CMS/[facility])    • Current every day smoker    • Hypercholesteremia    • Hypertension    • Ischemic cardiomyopathy 01/15/2015    DR. BARNETT   • Presence of combination internal cardiac defibrillator (ICD)  and pacemaker        Allergies   Allergen Reactions   • Codeine      GI UPSET       Past Surgical History:   Procedure Laterality Date   • BACK SURGERY     • CORONARY ARTERY BYPASS GRAFT     • OTHER SURGICAL HISTORY  2015    ICD IMPLANTATION; Dr. Fernandez       History reviewed. No pertinent family history.    Social History     Socioeconomic History   • Marital status:      Spouse name: Not on file   • Number of children: Not on file   • Years of education: Not on file   • Highest education level: Not on file   Tobacco Use   • Smoking status: Former Smoker     Packs/day: 1.00     Types: Cigarettes     Quit date: 2016     Years since quittin.8   • Smokeless tobacco: Never Used   Substance and Sexual Activity   • Alcohol use: No   • Drug use: No   • Sexual activity: Defer           Objective   Physical Exam  Vitals and nursing note reviewed.   Constitutional:       General: He is not in acute distress.     Appearance: He is not ill-appearing.   HENT:      Head: Normocephalic and atraumatic.      Mouth/Throat:      Mouth: Mucous membranes are moist.      Pharynx: Oropharynx is clear.   Eyes:      Extraocular Movements: Extraocular movements intact.   Cardiovascular:      Rate and Rhythm: Normal rate and regular rhythm.   Pulmonary:      Effort: Pulmonary effort is normal. Tachypnea present.      Breath sounds: No decreased breath sounds.   Abdominal:      General: Bowel sounds are normal. There is no abdominal bruit. There are no signs of injury.      Palpations: Abdomen is soft. There is no mass.      Tenderness: There is no right CVA tenderness, left CVA tenderness, guarding or rebound.      Comments: Diminished bowel sounds in 4 quadrants   Musculoskeletal:      Right lower leg: Edema present.      Left lower leg: Edema present.   Skin:     General: Skin is warm and dry.      Capillary Refill: Capillary refill takes less than 2 seconds.      Coloration: Skin is not cyanotic.   Neurological:      General:  No focal deficit present.      Mental Status: He is alert.   Psychiatric:         Mood and Affect: Mood normal.         Behavior: Behavior normal.         Critical Care  Performed by: Daniel Monique MD  Authorized by: Daniel Monique MD     Critical care provider statement:     Critical care time (minutes):  45    Critical care was necessary to treat or prevent imminent or life-threatening deterioration of the following conditions:  Metabolic crisis and respiratory failure    Critical care was time spent personally by me on the following activities:  Development of treatment plan with patient or surrogate, discussions with consultants, evaluation of patient's response to treatment, examination of patient, obtaining history from patient or surrogate, ordering and performing treatments and interventions, ordering and review of laboratory studies, ordering and review of radiographic studies, pulse oximetry and re-evaluation of patient's condition               ED Course  ED Course as of Sep 01 2121   Wed Sep 01, 2021   1320 COVID19(!!): Detected [RS]   1321 pO2, Arterial(!): 54.7 [RS]   1327 Ferritin(!): 1,057.00 [RS]   1327 proBNP(!): 2,959.0 [RS]   1327 Sodium(!!): 116 [RS]   1330 Patient is a chronically ill gentleman on 3 L nasal cannula secondary to a combination of COPD, black lung, and ongoing tobacco use.  Who presents today with 3 weeks of symptoms with a positive test for COVID-19 as well as hyponatremia and multiple elevated inflammatory markers.  We will plan admit the patient for further evaluation and management.  Hospitalist paged for admission.    [RS]   1331 Hospitalist recommends ICU admission secondary to the sodium level.  Intensivist paged.    [RS]   1344 Patient discussed with Dr. Cohn who will admit.    [RS]      ED Course User Index  [RS] Daniel Monique MD                                           MDM  Number of Diagnoses or Management Options  Black lung disease  (CMS/HCC)  Chronic respiratory failure with hypoxia, on home oxygen therapy (CMS/HCC)  COPD with acute exacerbation (CMS/HCC)  COVID-19  Hyponatremia  Tobacco use  Diagnosis management comments: Recent Results (from the past 24 hour(s))  -ECG 12 Lead  Collection Time: 09/01/21 12:11 PM       Result                      Value             Ref Range           QT Interval                 458               ms                  QTC Interval                557               ms             -Comprehensive Metabolic Panel  Collection Time: 09/01/21 12:35 PM  Specimen: Blood       Result                      Value             Ref Range           Glucose                     100 (H)           65 - 99 mg/dL       BUN                         13                8 - 23 mg/dL        Creatinine                  1.19              0.76 - 1.27 *       Sodium                      116 (C)           136 - 145 mm*       Potassium                   3.8               3.5 - 5.2 mm*       Chloride                    84 (L)            98 - 107 mmo*       CO2                         20.0 (L)          22.0 - 29.0 *       Calcium                     8.3 (L)           8.6 - 10.5 m*       Total Protein               6.9               6.0 - 8.5 g/*       Albumin                     3.60              3.50 - 5.20 *       ALT (SGPT)                  36                1 - 41 U/L          AST (SGOT)                  63 (H)            1 - 40 U/L          Alkaline Phosphatase        100               39 - 117 U/L        Total Bilirubin             1.1               0.0 - 1.2 mg*       eGFR Non  Amer       60 (L)            >60 mL/min/1*       Globulin                    3.3               gm/dL               A/G Ratio                   1.1               g/dL                BUN/Creatinine Ratio        10.9              7.0 - 25.0          Anion Gap                   12.0              5.0 - 15.0 m*  -BNP  Collection Time: 09/01/21 12:35 PM  Specimen:  Blood       Result                      Value             Ref Range           proBNP                      2,959.0 (H)       0.0 - 900.0 *  -Troponin  Collection Time: 09/01/21 12:35 PM  Specimen: Blood       Result                      Value             Ref Range           Troponin T                  <0.010            0.000 - 0.03*  -Green Top (Gel)  Collection Time: 09/01/21 12:35 PM       Result                      Value             Ref Range           Extra Tube                                                    Hold for add-ons.  -Lavender Top  Collection Time: 09/01/21 12:35 PM       Result                      Value             Ref Range           Extra Tube                                                    hold for add-on  -Gold Top - SST  Collection Time: 09/01/21 12:35 PM       Result                      Value             Ref Range           Extra Tube                                                    Hold for add-ons.  -Gray Top  Collection Time: 09/01/21 12:35 PM       Result                      Value             Ref Range           Extra Tube                                                    Hold for add-ons.  -Light Blue Top  Collection Time: 09/01/21 12:35 PM       Result                      Value             Ref Range           Extra Tube                                                    hold for add-on  -CBC Auto Differential  Collection Time: 09/01/21 12:35 PM  Specimen: Blood       Result                      Value             Ref Range           WBC                         6.53              3.40 - 10.80*       RBC                         4.36              4.14 - 5.80 *       Hemoglobin                  13.3              13.0 - 17.7 *       Hematocrit                  38.7              37.5 - 51.0 %       MCV                         88.8              79.0 - 97.0 *       MCH                         30.5              26.6 - 33.0 *       MCHC                        34.4              31.5 -  35.7 *       RDW                         13.1              12.3 - 15.4 %       RDW-SD                      42.5              37.0 - 54.0 *       MPV                         10.2              6.0 - 12.0 fL       Platelets                   181               140 - 450 10*       Neutrophil %                71.5              42.7 - 76.0 %       Lymphocyte %                21.7              19.6 - 45.3 %       Monocyte %                  6.3               5.0 - 12.0 %        Eosinophil %                0.0 (L)           0.3 - 6.2 %         Basophil %                  0.2               0.0 - 1.5 %         Immature Grans %            0.3               0.0 - 0.5 %         Neutrophils, Absolute       4.67              1.70 - 7.00 *       Lymphocytes, Absolute       1.42              0.70 - 3.10 *       Monocytes, Absolute         0.41              0.10 - 0.90 *       Eosinophils, Absolute       0.00              0.00 - 0.40 *       Basophils, Absolute         0.01              0.00 - 0.20 *       Immature Grans, Absolu*     0.02              0.00 - 0.05 *       nRBC                        0.0               0.0 - 0.2 /1*  -Lactate Dehydrogenase  Collection Time: 09/01/21 12:35 PM  Specimen: Blood       Result                      Value             Ref Range           LDH                         384 (H)           135 - 225 U/L  -Procalcitonin  Collection Time: 09/01/21 12:35 PM  Specimen: Blood       Result                      Value             Ref Range           Procalcitonin               0.10              0.00 - 0.25 *  -C-reactive Protein  Collection Time: 09/01/21 12:35 PM  Specimen: Blood       Result                      Value             Ref Range           C-Reactive Protein          0.76 (H)          0.00 - 0.50 *  -Lactic Acid, Plasma  Collection Time: 09/01/21 12:35 PM  Specimen: Blood       Result                      Value             Ref Range           Lactate                     1.4               0.5 - 2.0  mm*  -Ferritin  Collection Time: 09/01/21 12:35 PM  Specimen: Blood       Result                      Value             Ref Range           Ferritin                    1,057.00 (H)      30.00 - 400.*  -Fibrinogen  Collection Time: 09/01/21 12:35 PM  Specimen: Blood       Result                      Value             Ref Range           Fibrinogen                  419               220 - 470 mg*  -D-dimer, Quantitative  Collection Time: 09/01/21 12:35 PM  Specimen: Blood       Result                      Value             Ref Range           D-Dimer, Quantitative       0.87 (H)          0.00 - 0.56 *  -Hemoglobin A1c  Collection Time: 09/01/21 12:35 PM  Specimen: Blood       Result                      Value             Ref Range           Hemoglobin A1C              5.90 (H)          4.80 - 5.60 %  -TSH  Collection Time: 09/01/21 12:35 PM  Specimen: Blood       Result                      Value             Ref Range           TSH                         1.260             0.270 - 4.20*  -Cortisol  Collection Time: 09/01/21 12:35 PM  Specimen: Blood       Result                      Value             Ref Range           Cortisol                    28.88               mcg/dL       -Magnesium  Collection Time: 09/01/21 12:35 PM  Specimen: Blood       Result                      Value             Ref Range           Magnesium                   1.8               1.6 - 2.4 mg*  -COVID-19 and FLU A/B PCR - Swab, Nasopharynx  Collection Time: 09/01/21 12:39 PM  Specimen: Nasopharynx; Swab       Result                      Value             Ref Range           COVID19                     Detected (C)      Not Detected*       Influenza A PCR             Not Detected      Not Detected        Influenza B PCR             Not Detected      Not Detected   -Blood Gas, Arterial With Co-Ox  Collection Time: 09/01/21 12:55 PM  Specimen: Arterial Blood       Result                      Value             Ref Range           Site                         Right Radial                          Ty's Test                N/A                                   pH, Arterial                7.432             7.350 - 7.45*       pCO2, Arterial              29.9 (L)          35.0 - 45.0 *       pO2, Arterial               54.7 (L)          83.0 - 108.0*       HCO3, Arterial              20.0              20.0 - 26.0 *       Base Excess, Arterial       -3.2 (L)          0.0 - 2.0 mm*       Hemoglobin, Blood Gas       13.7              13.5 - 17.5 *       Hematocrit, Blood Gas       42.0              %                   Oxyhemoglobin               88.2 (L)          94 - 99 %           Methemoglobin               0.30              0.00 - 1.50 %       Carboxyhemoglobin           0.7               0 - 2 %             CO2 Content                 20.9 (L)          22 - 33 mmol*       Temperature                 37.0              C                   Barometric Pressure fo*                                           Modality                    Nasal Cannula                         FIO2                        32                %                   Rate                        0                 Breaths/evelyn*       PIP                         0                 cmH2O               IPAP                        0                                     EPAP                        0                                     Note                                                              pH, Temp Corrected          7.432             pH Units            pCO2, Temperature Stephanie*     29.9 (L)          35 - 48 mm Hg       pO2, Temperature Corre*     54.7 (L)          83 - 108 mm *  -Basic Metabolic Panel  Collection Time: 09/01/21  5:37 PM  Specimen: Blood       Result                      Value             Ref Range           Glucose                     153 (H)           65 - 99 mg/dL       BUN                         14                8 - 23 mg/dL        Creatinine                  1.19               0.76 - 1.27 *       Sodium                      115 (C)           136 - 145 mm*       Potassium                   3.5               3.5 - 5.2 mm*       Chloride                    83 (L)            98 - 107 mmo*       CO2                         18.0 (L)          22.0 - 29.0 *       Calcium                     8.1 (L)           8.6 - 10.5 m*       eGFR Non  Amer       60 (L)            >60 mL/min/1*       BUN/Creatinine Ratio        11.8              7.0 - 25.0          Anion Gap                   14.0              5.0 - 15.0 m*  -Osmolality, Urine -  Collection Time: 09/01/21  8:21 PM  Specimen: Urine       Result                      Value             Ref Range           Osmolality, Urine           220 (L)           300-1,100 mO*  -Sodium, Urine, Random -  Collection Time: 09/01/21  8:21 PM  Specimen: Urine       Result                      Value             Ref Range           Sodium, Urine               57                mmol/L         Note: In addition to lab results from this visit, the labs listed above may include labs taken at another facility or during a different encounter within the last 24 hours. Please correlate lab times with ED admission and discharge times for further clarification of the services performed during this visit.    CT Chest Without Contrast Diagnostic   Final Result    Patchy groundglass opacity seen with some interseptal    thickening suggesting findings typical for Covid-19 pneumonia. No    pleural effusion with reactive lymph nodes in the mediastinum and hilar    regions.         D:  09/01/2021    E:  09/01/2021         This report was finalized on 9/1/2021 5:04 PM by Dr. Shawnee Brenner MD.          XR Chest 1 View   Final Result    Heart is enlarged with increased pulmonary vascularity    bilaterally and increased markings superimposed in the periphery in the    mid and lower lung field. Superimposed infiltrate is of concern.    Clinical correlation needed.      "    D:  09/01/2021    E:  09/01/2021         This report was finalized on 9/1/2021 5:04 PM by Dr. Shawnee Brenner MD.          XR Chest 1 View    (Results Pending)  ------------------------------------------------------------------            09/01/21 09/01/21 09/01/21 09/01/21               1300      2028 2034             2100     ------------------------------------------------------------------   BP:       129/76    142/78          142/78           102/66     BP Location:                           Right arm                    Patient Position:                             Lying                      Pulse:      85        79              80               79       Resp:                                 22                        Temp:                          97.5 °F (36.4 °C)                TempSrc:                             Oral                       SpO2:      94%                        91%             91%       Weight:                     107 kg (236 lb 12.4 oz)             Height:                         182.9 cm (72\")                 ------------------------------------------------------------------  Medications  sodium chloride 0.9 % flush 10 mL (has no administration in time range)  acetaminophen (TYLENOL) tablet 650 mg (has no administration in time range)  ondansetron (ZOFRAN) injection 4 mg (has no administration in time range)  dexamethasone (DECADRON) injection 6 mg (has no administration in time range)  albuterol sulfate HFA (PROVENTIL HFA;VENTOLIN HFA;PROAIR HFA) inhaler 2 puff (has no administration in time range)  apixaban (ELIQUIS) tablet 5 mg (5 mg Oral Given 9/1/21 2029)  atorvastatin (LIPITOR) tablet 10 mg (10 mg Oral Given 9/1/21 2029)  famotidine (PEPCID) tablet 20 mg (has no administration in time range)  isosorbide dinitrate (ISORDIL) tablet 20 mg (20 mg Oral Given 9/1/21 " 2028)  metoprolol succinate XL (TOPROL-XL) 24 hr tablet 25 mg (has no administration in time range)  nitroglycerin (NITROSTAT) SL tablet 0.4 mg (has no administration in time range)  tamsulosin (FLOMAX) 24 hr capsule 0.4 mg (0.4 mg Oral Given 9/1/21 2029)  remdesivir 200 mg in sodium chloride 0.9 % 290 mL IVPB (powder vial) (200 mg Intravenous New Bag 9/1/21 2026)    Followed by  remdesivir 100 mg in sodium chloride 0.9 % 270 mL IVPB (powder vial) (has no administration in time range)  Pharmacy Consult - Remdesivir (has no administration in time range)  albuterol sulfate HFA (PROVENTIL HFA;VENTOLIN HFA;PROAIR HFA) inhaler 2 puff (2 puffs Inhalation Given 9/1/21 1235)  methylPREDNISolone sodium succinate (SOLU-Medrol) injection 40 mg (40 mg Intravenous Given 9/1/21 1310)  bumetanide (BUMEX) injection 2.5 mg (2.5 mg Intravenous Given 9/1/21 1511)  tolvaptan (SAMSCA) tablet 7.5 mg (7.5 mg Oral Given 9/1/21 2028)  ECG/EMG Results (last 24 hours)     Procedure Component Value Units Date/Time    ECG 12 Lead ([id number]) Collected: 09/01/21 1211     Updated: 09/01/21 1525     QT Interval 458 ms      QTC Interval 557 ms     Narrative:      Test Reason : SOA Protocol  Blood Pressure :   */*   mmHG  Vent. Rate :  89 BPM     Atrial Rate :  89 BPM     P-R Int : 112 ms          QRS Dur : 154 ms      QT Int : 458 ms       P-R-T Axes : 108 -47 125 degrees     QTc Int : 557 ms    ** Poor data quality, interpretation may be adversely affected  Atrial-sensed ventricular-paced rhythm  Biventricular pacemaker detected  Abnormal ECG  No previous ECGs available  Confirmed by RACHNA PASTOR MD ([id number]) on 9/1/2021 3:25:18 PM    Referred By:            Confirmed By: RACHNA PASTOR MD      ECG 12 Lead   Final Result    Test Reason : SOA Protocol    Blood Pressure :   */*   mmHG    Vent. Rate :  89 BPM     Atrial Rate :  89 BPM       P-R Int : 112 ms          QRS Dur : 154 ms        QT Int : 458 ms       P-R-T Axes : 108 -47 125 degrees       QTc  Int : 557 ms        ** Poor data quality, interpretation may be adversely affected    Atrial-sensed ventricular-paced rhythm    Biventricular pacemaker detected    Abnormal ECG    No previous ECGs available    Confirmed by RACHNA MONIQUE MD ([id number]) on 9/1/2021 3:25:18 PM        Referred By:            Confirmed By: RACHNA MONIQUE MD            Amount and/or Complexity of Data Reviewed  Clinical lab tests: reviewed  Tests in the radiology section of CPT®: reviewed  Discuss the patient with other providers: yes  Independent visualization of images, tracings, or specimens: yes    Critical Care  Total time providing critical care: 30-74 minutes      Final diagnoses:   COVID-19   Black lung disease (CMS/HCC)   COPD with acute exacerbation (CMS/HCC)   Chronic respiratory failure with hypoxia, on home oxygen therapy (CMS/HCC)   Hyponatremia   Tobacco use       ED Disposition  ED Disposition     ED Disposition Condition Comment    Decision to Admit  Level of Care: Critical Care [6]   Diagnosis: COVID-19 [[id number]]   Admitting Physician: JAYE ROJAS [[id number]]   Certification: I Certify That Inpatient Hospital Services Are Medically Necessary For Greater Than 2 Midnights            No follow-up provider specified.       Medication List      No changes were made to your prescriptions during this visit.          Rachna Monique MD  09/01/21 0088

## 2021-09-02 ENCOUNTER — APPOINTMENT (OUTPATIENT)
Dept: GENERAL RADIOLOGY | Facility: HOSPITAL | Age: 71
End: 2021-09-02

## 2021-09-02 ENCOUNTER — APPOINTMENT (OUTPATIENT)
Dept: CARDIOLOGY | Facility: HOSPITAL | Age: 71
End: 2021-09-02

## 2021-09-02 LAB
ALBUMIN SERPL-MCNC: 3.5 G/DL (ref 3.5–5.2)
ALBUMIN/GLOB SERPL: 1.1 G/DL
ALP SERPL-CCNC: 103 U/L (ref 39–117)
ALT SERPL W P-5'-P-CCNC: 37 U/L (ref 1–41)
ANION GAP SERPL CALCULATED.3IONS-SCNC: 13 MMOL/L (ref 5–15)
ANION GAP SERPL CALCULATED.3IONS-SCNC: 14 MMOL/L (ref 5–15)
AST SERPL-CCNC: 68 U/L (ref 1–40)
BASOPHILS # BLD AUTO: 0.01 10*3/MM3 (ref 0–0.2)
BASOPHILS NFR BLD AUTO: 0.1 % (ref 0–1.5)
BILIRUB SERPL-MCNC: 1.1 MG/DL (ref 0–1.2)
BUN SERPL-MCNC: 15 MG/DL (ref 8–23)
BUN SERPL-MCNC: 16 MG/DL (ref 8–23)
BUN SERPL-MCNC: 19 MG/DL (ref 8–23)
BUN SERPL-MCNC: 21 MG/DL (ref 8–23)
BUN/CREAT SERPL: 11.9 (ref 7–25)
BUN/CREAT SERPL: 12.2 (ref 7–25)
BUN/CREAT SERPL: 14.1 (ref 7–25)
BUN/CREAT SERPL: 15.9 (ref 7–25)
CALCIUM SPEC-SCNC: 8.3 MG/DL (ref 8.6–10.5)
CALCIUM SPEC-SCNC: 8.6 MG/DL (ref 8.6–10.5)
CALCIUM SPEC-SCNC: 8.7 MG/DL (ref 8.6–10.5)
CALCIUM SPEC-SCNC: 8.7 MG/DL (ref 8.6–10.5)
CHLORIDE SERPL-SCNC: 85 MMOL/L (ref 98–107)
CHLORIDE SERPL-SCNC: 88 MMOL/L (ref 98–107)
CHLORIDE SERPL-SCNC: 89 MMOL/L (ref 98–107)
CHLORIDE SERPL-SCNC: 90 MMOL/L (ref 98–107)
CO2 SERPL-SCNC: 20 MMOL/L (ref 22–29)
CO2 SERPL-SCNC: 21 MMOL/L (ref 22–29)
CO2 SERPL-SCNC: 21 MMOL/L (ref 22–29)
CO2 SERPL-SCNC: 24 MMOL/L (ref 22–29)
CREAT SERPL-MCNC: 1.23 MG/DL (ref 0.76–1.27)
CREAT SERPL-MCNC: 1.32 MG/DL (ref 0.76–1.27)
CREAT SERPL-MCNC: 1.34 MG/DL (ref 0.76–1.27)
CREAT SERPL-MCNC: 1.35 MG/DL (ref 0.76–1.27)
D DIMER PPP FEU-MCNC: 0.9 MCGFEU/ML (ref 0–0.56)
DEPRECATED RDW RBC AUTO: 43.1 FL (ref 37–54)
EOSINOPHIL # BLD AUTO: 0 10*3/MM3 (ref 0–0.4)
EOSINOPHIL NFR BLD AUTO: 0 % (ref 0.3–6.2)
ERYTHROCYTE [DISTWIDTH] IN BLOOD BY AUTOMATED COUNT: 13.2 % (ref 12.3–15.4)
FIBRINOGEN PPP-MCNC: 394 MG/DL (ref 220–470)
GFR SERPL CREATININE-BSD FRML MDRD: 52 ML/MIN/1.73
GFR SERPL CREATININE-BSD FRML MDRD: 52 ML/MIN/1.73
GFR SERPL CREATININE-BSD FRML MDRD: 53 ML/MIN/1.73
GFR SERPL CREATININE-BSD FRML MDRD: 58 ML/MIN/1.73
GLOBULIN UR ELPH-MCNC: 3.3 GM/DL
GLUCOSE BLDC GLUCOMTR-MCNC: 129 MG/DL (ref 70–130)
GLUCOSE SERPL-MCNC: 100 MG/DL (ref 65–99)
GLUCOSE SERPL-MCNC: 106 MG/DL (ref 65–99)
GLUCOSE SERPL-MCNC: 119 MG/DL (ref 65–99)
GLUCOSE SERPL-MCNC: 130 MG/DL (ref 65–99)
HCT VFR BLD AUTO: 40.7 % (ref 37.5–51)
HGB BLD-MCNC: 13.9 G/DL (ref 13–17.7)
IL6 SERPL-MCNC: 36.5 PG/ML (ref 0–13)
IMM GRANULOCYTES # BLD AUTO: 0.02 10*3/MM3 (ref 0–0.05)
IMM GRANULOCYTES NFR BLD AUTO: 0.3 % (ref 0–0.5)
LYMPHOCYTES # BLD AUTO: 1.52 10*3/MM3 (ref 0.7–3.1)
LYMPHOCYTES NFR BLD AUTO: 20.5 % (ref 19.6–45.3)
MAGNESIUM SERPL-MCNC: 1.8 MG/DL (ref 1.6–2.4)
MCH RBC QN AUTO: 30.5 PG (ref 26.6–33)
MCHC RBC AUTO-ENTMCNC: 34.2 G/DL (ref 31.5–35.7)
MCV RBC AUTO: 89.5 FL (ref 79–97)
MONOCYTES # BLD AUTO: 0.43 10*3/MM3 (ref 0.1–0.9)
MONOCYTES NFR BLD AUTO: 5.8 % (ref 5–12)
NEUTROPHILS NFR BLD AUTO: 5.45 10*3/MM3 (ref 1.7–7)
NEUTROPHILS NFR BLD AUTO: 73.3 % (ref 42.7–76)
NRBC BLD AUTO-RTO: 0 /100 WBC (ref 0–0.2)
NT-PROBNP SERPL-MCNC: 2578 PG/ML (ref 0–900)
OSMOLALITY SERPL: 257 MOSM/KG (ref 275–295)
PHOSPHATE SERPL-MCNC: 3.5 MG/DL (ref 2.5–4.5)
PLATELET # BLD AUTO: 206 10*3/MM3 (ref 140–450)
PMV BLD AUTO: 10.4 FL (ref 6–12)
POTASSIUM SERPL-SCNC: 3.8 MMOL/L (ref 3.5–5.2)
POTASSIUM SERPL-SCNC: 3.9 MMOL/L (ref 3.5–5.2)
POTASSIUM SERPL-SCNC: 3.9 MMOL/L (ref 3.5–5.2)
POTASSIUM SERPL-SCNC: 4.7 MMOL/L (ref 3.5–5.2)
PROCALCITONIN SERPL-MCNC: 0.11 NG/ML (ref 0–0.25)
PROT SERPL-MCNC: 6.8 G/DL (ref 6–8.5)
RBC # BLD AUTO: 4.55 10*6/MM3 (ref 4.14–5.8)
SODIUM SERPL-SCNC: 118 MMOL/L (ref 136–145)
SODIUM SERPL-SCNC: 122 MMOL/L (ref 136–145)
SODIUM SERPL-SCNC: 125 MMOL/L (ref 136–145)
SODIUM SERPL-SCNC: 126 MMOL/L (ref 136–145)
TROPONIN T SERPL-MCNC: <0.01 NG/ML (ref 0–0.03)
WBC # BLD AUTO: 7.43 10*3/MM3 (ref 3.4–10.8)

## 2021-09-02 PROCEDURE — 71045 X-RAY EXAM CHEST 1 VIEW: CPT

## 2021-09-02 PROCEDURE — 93306 TTE W/DOPPLER COMPLETE: CPT

## 2021-09-02 PROCEDURE — 85384 FIBRINOGEN ACTIVITY: CPT | Performed by: INTERNAL MEDICINE

## 2021-09-02 PROCEDURE — 99253 IP/OBS CNSLTJ NEW/EST LOW 45: CPT | Performed by: INTERNAL MEDICINE

## 2021-09-02 PROCEDURE — 25010000003 MAGNESIUM SULFATE 4 GM/100ML SOLUTION: Performed by: INTERNAL MEDICINE

## 2021-09-02 PROCEDURE — 84484 ASSAY OF TROPONIN QUANT: CPT | Performed by: INTERNAL MEDICINE

## 2021-09-02 PROCEDURE — 84100 ASSAY OF PHOSPHORUS: CPT | Performed by: INTERNAL MEDICINE

## 2021-09-02 PROCEDURE — 85025 COMPLETE CBC W/AUTO DIFF WBC: CPT | Performed by: INTERNAL MEDICINE

## 2021-09-02 PROCEDURE — 84145 PROCALCITONIN (PCT): CPT | Performed by: INTERNAL MEDICINE

## 2021-09-02 PROCEDURE — 80048 BASIC METABOLIC PNL TOTAL CA: CPT | Performed by: INTERNAL MEDICINE

## 2021-09-02 PROCEDURE — 80053 COMPREHEN METABOLIC PANEL: CPT | Performed by: INTERNAL MEDICINE

## 2021-09-02 PROCEDURE — 93306 TTE W/DOPPLER COMPLETE: CPT | Performed by: INTERNAL MEDICINE

## 2021-09-02 PROCEDURE — 83735 ASSAY OF MAGNESIUM: CPT | Performed by: INTERNAL MEDICINE

## 2021-09-02 PROCEDURE — 99291 CRITICAL CARE FIRST HOUR: CPT | Performed by: INTERNAL MEDICINE

## 2021-09-02 PROCEDURE — 82962 GLUCOSE BLOOD TEST: CPT

## 2021-09-02 PROCEDURE — 25010000002 DEXAMETHASONE PER 1 MG: Performed by: INTERNAL MEDICINE

## 2021-09-02 PROCEDURE — 85379 FIBRIN DEGRADATION QUANT: CPT | Performed by: INTERNAL MEDICINE

## 2021-09-02 PROCEDURE — 83880 ASSAY OF NATRIURETIC PEPTIDE: CPT | Performed by: INTERNAL MEDICINE

## 2021-09-02 RX ORDER — CHOLECALCIFEROL (VITAMIN D3) 125 MCG
5 CAPSULE ORAL NIGHTLY PRN
Status: DISCONTINUED | OUTPATIENT
Start: 2021-09-02 | End: 2021-09-02

## 2021-09-02 RX ORDER — MAGNESIUM SULFATE HEPTAHYDRATE 40 MG/ML
4 INJECTION, SOLUTION INTRAVENOUS AS NEEDED
Status: DISCONTINUED | OUTPATIENT
Start: 2021-09-02 | End: 2021-09-04

## 2021-09-02 RX ORDER — CHOLECALCIFEROL (VITAMIN D3) 125 MCG
10 CAPSULE ORAL NIGHTLY PRN
Status: DISCONTINUED | OUTPATIENT
Start: 2021-09-02 | End: 2021-09-06 | Stop reason: HOSPADM

## 2021-09-02 RX ORDER — BUMETANIDE 0.25 MG/ML
2.5 INJECTION INTRAMUSCULAR; INTRAVENOUS ONCE
Status: COMPLETED | OUTPATIENT
Start: 2021-09-02 | End: 2021-09-02

## 2021-09-02 RX ADMIN — TAMSULOSIN HYDROCHLORIDE 0.4 MG: 0.4 CAPSULE ORAL at 20:42

## 2021-09-02 RX ADMIN — FAMOTIDINE 20 MG: 20 TABLET, FILM COATED ORAL at 08:21

## 2021-09-02 RX ADMIN — MAGNESIUM SULFATE HEPTAHYDRATE 4 G: 40 INJECTION, SOLUTION INTRAVENOUS at 08:30

## 2021-09-02 RX ADMIN — Medication 10 MG: at 21:57

## 2021-09-02 RX ADMIN — METOPROLOL SUCCINATE 25 MG: 25 TABLET, EXTENDED RELEASE ORAL at 08:21

## 2021-09-02 RX ADMIN — Medication 5 MG: at 02:11

## 2021-09-02 RX ADMIN — BUMETANIDE 2.5 MG: 0.25 INJECTION, SOLUTION INTRAMUSCULAR; INTRAVENOUS at 11:46

## 2021-09-02 RX ADMIN — APIXABAN 5 MG: 5 TABLET, FILM COATED ORAL at 20:42

## 2021-09-02 RX ADMIN — ISOSORBIDE DINITRATE 20 MG: 20 TABLET ORAL at 08:21

## 2021-09-02 RX ADMIN — APIXABAN 5 MG: 5 TABLET, FILM COATED ORAL at 08:22

## 2021-09-02 RX ADMIN — REMDESIVIR 100 MG: 100 INJECTION, POWDER, LYOPHILIZED, FOR SOLUTION INTRAVENOUS at 17:16

## 2021-09-02 RX ADMIN — ATORVASTATIN CALCIUM 10 MG: 10 TABLET, FILM COATED ORAL at 20:42

## 2021-09-02 RX ADMIN — DEXAMETHASONE SODIUM PHOSPHATE 6 MG: 4 INJECTION, SOLUTION INTRA-ARTICULAR; INTRALESIONAL; INTRAMUSCULAR; INTRAVENOUS; SOFT TISSUE at 08:20

## 2021-09-02 RX ADMIN — ISOSORBIDE DINITRATE 20 MG: 20 TABLET ORAL at 17:16

## 2021-09-02 NOTE — CONSULTS
[facility] at [facility]  Cardiovascular Consultation Note    Reason for consultation: Acute on chronic hypoxic respiratory failure secondary to Covid and CHF redo ischemic cardiomyopathy #3 CAD    History of Present Illness:  71-year-old male patient of Dr. Felix who has had previous bypass surgery and has a nonischemic cardiomyopathy.  He also has a BiV ICD.  Patient also has hyperlipidemia hypertension.  The patient has been fully vaccinated.  He presents with increasing shortness of breath was found to have Covid 19 pneumonia and elevated BNP.  The patient states he had significant diarrhea when he came into the hospital.  States he recently been placed on antibiotics.  The patient sleeps on 2 pillows and sleeps with his oxygen.  He states he had increased shortness of breath.  States he has intermittent lower extremity edema.  He denies tightness heaviness squeezing pressure chest jaw throat arms.  He denies palpitations.  No firing of his device.  The patient received IV Bumex and breathing is improved.    Cardiac risk factors: Age greater than 45 #2 male sex #3 previous CAD #4 hyperlipidemia #5 hypertension    Past medical and surgical history, social and family history reviewed in EMR.    REVIEW OF SYSTEMS:     Past Medical History:   Diagnosis Date   • Acid reflux    • Anxiety    • Anxiety    • Black lung disease (CMS/HCC)    • COPD (chronic obstructive pulmonary disease) (CMS/[facility], located within [facility])    • Current every day smoker    • Hypercholesteremia    • Hypertension    • Ischemic cardiomyopathy 01/15/2015    DR. BARNETT   • Presence of combination internal cardiac defibrillator (ICD) and pacemaker      Past Surgical History:   Procedure Laterality Date   • BACK SURGERY     • CORONARY ARTERY BYPASS GRAFT     • OTHER SURGICAL HISTORY  2015    ICD IMPLANTATION; Dr. Barnett     Social History     Socioeconomic History   • Marital status:      Spouse name: Not on file   • Number of children: Not on  file   • Years of education: Not on file   • Highest education level: Not on file   Tobacco Use   • Smoking status: Former Smoker     Packs/day: 1.00     Types: Cigarettes     Quit date: 2016     Years since quittin.8   • Smokeless tobacco: Never Used   Substance and Sexual Activity   • Alcohol use: No   • Drug use: No   • Sexual activity: Defer     History reviewed. No pertinent family history.    H&P ROS reviewed and pertinent CV ROS as noted in HPI.    Cardiac: Patient has nonischemic cardiomyopathy and has a BiV ICD.  Ischemic heart disease with prior bypass surgery.  Respiratory: As comanage pneumoconiosis, on chronic O2.  COPD.  Presents with increased shortness of breath and hypoxia  Lower Extremities: Intermittent lower extremity edema  GI: Patient had significant diarrhea.  He also complained of increased abdominal girth  Neuro: No stroke TIA or neurologic event  Hematology: No bleeding diathesis ecchymosis or petechia      Physical Exam: General Pleasant obese male sitting in a recliner with oxygen with some resting tachypnea but denies overt dyspnea       HEENT: No JVP, no bruits, nasal O2 present, no icterus       Respiratory: Equal bilateral symmetrical expansion with bibasilar crackles       Cardiovascular: Regular rate and rhythm with occasional ectopic no significant murmur, has trace pitting edema       GI: Obese positive bowel sounds no fluid wave       Lower Extremities: No obvious lesions       Neuro: Facial expressions are symmetrical moves all 4 extremities       Skin: Warm and dry with trace edema to palpation       Psych: Pleasant affect and oriented x3    Results Review: The BNP on admission was 2959 is now down to thousand 570 2:08 0.5 L of diuresis.  Sodium was quite low on admission at 118 is now up to 122.  Urine and serum osmolality both were low.  The GFR is 52.  Review of EKG shows sensed atrial and paced ventricular response.  Troponin is negative.  Hemoglobin is normal.            Vital Sign Min/Max for last 24 hours  Temp  Min: 97.5 °F (36.4 °C)  Max: 98.7 °F (37.1 °C)   BP  Min: 102/59  Max: 142/78   Pulse  Min: 73  Max: 97   Resp  Min: 20  Max: 26   SpO2  Min: 89 %  Max: 94 %   No data recorded      Intake/Output Summary (Last 24 hours) at 9/2/2021 1005  Last data filed at 9/2/2021 0821  Gross per 24 hour   Intake 270 ml   Output 3150 ml   Net -2880 ml             Current Facility-Administered Medications:   •  acetaminophen (TYLENOL) tablet 650 mg, 650 mg, Oral, Q4H PRN, Baldo Cohn MD  •  albuterol sulfate HFA (PROVENTIL HFA;VENTOLIN HFA;PROAIR HFA) inhaler 2 puff, 2 puff, Inhalation, Q4H PRN, Baldo Cohn MD  •  apixaban (ELIQUIS) tablet 5 mg, 5 mg, Oral, BID, Baldo Cohn MD, 5 mg at 09/02/21 0822  •  atorvastatin (LIPITOR) tablet 10 mg, 10 mg, Oral, Nightly, Baldo Cohn MD, 10 mg at 09/01/21 2029  •  dexamethasone (DECADRON) injection 6 mg, 6 mg, Intravenous, Daily, Baldo Cohn MD, 6 mg at 09/02/21 0820  •  famotidine (PEPCID) tablet 20 mg, 20 mg, Oral, Daily, Baldo Cohn MD, 20 mg at 09/02/21 0821  •  isosorbide dinitrate (ISORDIL) tablet 20 mg, 20 mg, Oral, BID - Nitrates, Baldo Cohn MD, 20 mg at 09/02/21 0821  •  magnesium sulfate 4g/100mL (PREMIX) infusion, 4 g, Intravenous, PRN, Baldo Cohn MD, 4 g at 09/02/21 0830  •  metoprolol succinate XL (TOPROL-XL) 24 hr tablet 25 mg, 25 mg, Oral, Daily, Baldo Cohn MD, 25 mg at 09/02/21 0821  •  nitroglycerin (NITROSTAT) SL tablet 0.4 mg, 0.4 mg, Sublingual, Q5 Min PRN, Baldo Cohn MD  •  ondansetron (ZOFRAN) injection 4 mg, 4 mg, Intravenous, Q6H PRN, Baldo Cohn MD  •  Pharmacy Consult - Remdesivir, , Does not apply, Continuous PRN, Baldo Cohn MD  •  [COMPLETED] remdesivir 200 mg in sodium chloride 0.9 % 290 mL IVPB (powder vial), 200 mg, Intravenous, Q24H, 200 mg at 09/01/21 2026 **FOLLOWED BY** remdesivir 100 mg in sodium chloride 0.9 % 270 mL IVPB (powder vial), 100 mg, Intravenous,  Daily With Lunch, Baldo Cohn MD  •  sodium chloride 0.9 % flush 10 mL, 10 mL, Intravenous, PRN, Daniel Monique MD  •  tamsulosin (FLOMAX) 24 hr capsule 0.4 mg, 0.4 mg, Oral, Nightly, Baldo Cohn MD, 0.4 mg at 09/01/21 2029    Lab Review:   Results from last 7 days   Lab Units 09/02/21  0603 09/01/21  1235   WBC 10*3/mm3 7.43 6.53   HEMOGLOBIN g/dL 13.9 13.3   PLATELETS 10*3/mm3 206 181     Results from last 7 days   Lab Units 09/02/21  0603 09/01/21  2346 09/01/21  1737 09/01/21  1235 09/01/21  1235   SODIUM mmol/L 122* 118* 115*   < > 116*   POTASSIUM mmol/L 4.7 3.9 3.5   < > 3.8   CO2 mmol/L 21.0* 20.0* 18.0*   < > 20.0*   BUN mg/dL 16 15 14   < > 13   CREATININE mg/dL 1.34* 1.23 1.19   < > 1.19   MAGNESIUM mg/dL 1.8  --   --   --  1.8   PHOSPHORUS mg/dL 3.5  --   --   --   --    GLUCOSE mg/dL 100* 119* 153*   < > 100*    < > = values in this interval not displayed.     Estimated Creatinine Clearance: 62.1 mL/min (A) (by C-G formula based on SCr of 1.34 mg/dL (H)).    Results from last 7 days   Lab Units 09/01/21  1235   HEMOGLOBIN A1C % 5.90*     .lipd  Lab Results   Component Value Date    CHLPL 134 02/08/2018    TRIG 133 02/08/2018    HDL 41 02/08/2018    AST 68 (H) 09/02/2021    ALT 37 09/02/2021       Radiology Reports:  Imaging Results (Last 72 Hours)     Procedure Component Value Units Date/Time    XR Chest 1 View [[id number]] Collected: 09/02/21 0817     Updated: 09/02/21 0854    Narrative:      EXAMINATION: XR CHEST 1 VW-09/02/2021:     INDICATION: CHF, Covid-19; U07.1-COVID-19; K32-Gvymjukojx's  pneumoconiosis; J44.1-Chronic obstructive pulmonary disease with (acute)  exacerbation; J96.11-Chronic respiratory failure with hypoxia;  Z99.81-Dependence on supplemental oxygen; E87.4-Gjav-dfcljzyaiv and  hyponatremia; Z72.0-Tobacco use.     COMPARISON: 09/01/2021.     FINDINGS: Left sided triple-lead ICD is again noted. The heart is normal  in size. The vasculature appears normal. Patchy  bibasilar interstitial  disease appears stable. The upper lungs remain clear. No pneumothorax or  effusion is seen.       Impression:      Stable bibasilar pulmonary interstitial disease.     D:  09/02/2021  E:  09/02/2021              CT Chest Without Contrast Diagnostic [[id number]] Collected: 09/01/21 1350     Updated: 09/01/21 1708    Narrative:      EXAMINATION: CT CHEST WO CONTRAST DIAGNOSTIC-      INDICATION: COVID Evaluation, Cough, Fever; U07.1-COVID-19;  V57-Ylfxlvwlno's pneumoconiosis; J44.1-Chronic obstructive pulmonary  disease with (acute) exacerbation; J96.11-Chronic respiratory failure  with hypoxia; Z99.81-Dependence on supplemental oxygen;  E87.1-Idpx-sleglhjekd and hyponatremia; Z72.0-Tobacco use, shortness of  breath, chest pain.     TECHNIQUE: Multiple axial CT imaging was obtained of the chest without  the administration of intravenous contrast.     The radiation dose reduction device was turned on for each scan per the  ALARA (As Low as Reasonably Achievable) protocol.     COMPARISON: NONE.     FINDINGS: The lung parenchyma reveals emphysematous and chronic changes  seen bilaterally with patchy groundglass opacity seen in the periphery  of the upper mid and lower lung field. Findings typical in appearance  for Covid-19 pneumonia. No pleural effusion or pneumothorax. No  pulmonary mass or nodule. The thyroid is homogeneous. No mediastinal  mass. Reactive adenopathy in the mediastinum and hilar regions. Coronary  artery calcification identified. Cardiac chambers are within normal  limits. No pericardial effusion. The visualized upper abdomen reveals  stones in the gallbladder.       Impression:      Patchy groundglass opacity seen with some interseptal  thickening suggesting findings typical for Covid-19 pneumonia. No  pleural effusion with reactive lymph nodes in the mediastinum and hilar  regions.     D:  09/01/2021  E:  09/01/2021     This report was finalized on 9/1/2021 5:04 PM by   Shawnee Brenner MD.       XR Chest 1 View [[id number]] Collected: 09/01/21 1330     Updated: 09/01/21 1707    Narrative:      EXAMINATION: XR CHEST 1 VW- 09/01/2021     INDICATION: SOA triage protocol      COMPARISON: NONE     FINDINGS: Portable chest reveals pacer leads in satisfactory position.  Patchy airspace disease seen in the periphery of the mid and lower lung  fields. There is increased pulmonary vascularity bilaterally.  Degenerative changes seen within the spine. Patient status post median  sternotomy.          Impression:      Heart is enlarged with increased pulmonary vascularity  bilaterally and increased markings superimposed in the periphery in the  mid and lower lung field. Superimposed infiltrate is of concern.  Clinical correlation needed.     D:  09/01/2021  E:  09/01/2021     This report was finalized on 9/1/2021 5:04 PM by Dr. Shawnee Brenner MD.             Assessment/Plan: 1 this patient has  An ischemic cardiomyopathy and presents presents with acute on chronic hypoxic respiratory failure he clearly has Covid but his BNP is also elevated.  The patient's had an excellent diuretic response overnight and feels good.  He still has bilateral crackleas and BNP is still elevated.  He would benefit from another dose of diuretics and since he is hyponatremic would use tolvaptan 7.5 mg.  I will defer this to the intensivist.  Will obtain 2D echocardiogram  2 CAD-patient denies any General  Dr. Felix will resume care in the morning        Mak Lewis MD  09/02/21  10:05 EDT

## 2021-09-02 NOTE — PROGRESS NOTES
INTENSIVIST   HOSPITAL VISIT NOTE     Hospital:  LOS: 1 day     Subjective   S     Mr. Morgan Luna, 71 y.o. male is followed for:Shortness of Breath       C-19 Pneumonitis  CHF    Heart failure with reduced ejection fraction (CMS/formerly [facility])    As an Intensivist, we provide an integrated approach to the ICU patient and family, medical management of comorbid conditions, including but not limited to electrolytes, glycemic control, organ dysfunction, lead interdisciplinary rounds and coordinate the care with all other services, including those from other specialists.     Interval History:  No diarrhea.    Breathing better.    Temp  Min: 97.5 °F (36.4 °C)  Max: 98.7 °F (37.1 °C)       The patient's relevant past medical, surgical and social history were reviewed and updated in Epic as appropriate.      History     Last Reviewed by Baldo Cohn MD on 9/1/2021 at  2:07 PM    Sections Reviewed    Medical, Family, Surgical, Tobacco, Alcohol, Drug Use, Sexual Activity,   Social Documentation      Problem list reviewed by Baldo Cohn MD on 9/1/2021 at  2:07 PM  Allergies reviewed by Baldo Cohn MD on 9/1/2021 at  2:07 PM       Objective   O     Vitals:  Temp: 98 °F (36.7 °C) (09/02/21 0400) Temp  Min: 97.5 °F (36.4 °C)  Max: 98.7 °F (37.1 °C)   Temp core:      BP: 127/63 (09/02/21 0821) BP  Min: 102/59  Max: 142/78   Pulse: 92 (09/02/21 0821) Pulse  Min: 73  Max: 97   Resp: 26 (09/02/21 0600) Resp  Min: 20  Max: 26   SpO2: (!) 89 % (09/02/21 0650) SpO2  Min: 89 %  Max: 94 %   Device: humidified, nasal cannula (09/02/21 0650)    Flow Rate: 4 (09/02/21 0650) Flow (L/min)  Min: 3  Max: 4     Intake/Ouptut 24 hrs (7:00AM - 6:59 AM)    Intake & Output (last 3 days)       08/30 0701 - 08/31 0700 08/31 0701 - 09/01 0700 09/01 0701 - 09/02 0700 09/02 0701 - 09/03 0700    IV Piggyback   270     Total Intake(mL/kg)   270 (2.5)     Urine (mL/kg/hr)   2850 300 (0.8)    Total Output   2850 300    Net   -2580 -300                     Physical Examination    Telemetry:  Rhythm: paced rhythm (09/02/21 0600)         Constitutional:  No acute distress.   Head: Normocephalic.   ENMT: No oral lesions.   Neck: No adenopathy. Supple.  Trachea midline.   Cardiovascular: RRR.   Normal heart sounds.  No murmurs, gallop or rub.   Respiratory: Normal breath sounds  Crackles.   Abdominal:  Soft with no tenderness.  No distension.   No HSM.   Extremities: Warm.  Dry.  No cyanosis.  Edema   Neurological/Psych:   Alert, Oriented, Cooperative.  Best Eye Response: 4-->(E4) spontaneous (09/02/21 0600)  Best Motor Response: 6-->(M6) obeys commands (09/02/21 0600)  Best Verbal Response: 5-->(V5) oriented (09/02/21 0600)  [place] Coma Scale Score: 15 (09/02/21 0600)     Results Reviewed:  Laboratory  Microbiology  Radiology  Pathology    Hematology:  Results from last 7 days   Lab Units 09/02/21 0603 09/01/21  1235   WBC 10*3/mm3 7.43 6.53   HEMOGLOBIN g/dL 13.9 13.3   PLATELETS 10*3/mm3 206 181   NEUTROS ABS 10*3/mm3 5.45 4.67   LYMPHS ABS 10*3/mm3 1.52 1.42     Chemistry:  Estimated Creatinine Clearance: 62.1 mL/min (A) (by C-G formula based on SCr of 1.34 mg/dL (H)).  Results from last 7 days   Lab Units 09/02/21 0603 09/01/21 2346 09/01/21  1737 09/01/21  1235 09/01/21  1235   SODIUM mmol/L 122* 118* 115*   < > 116*   POTASSIUM mmol/L 4.7 3.9 3.5   < > 3.8   CHLORIDE mmol/L 88* 85* 83*  --  84*   CO2 mmol/L 21.0* 20.0* 18.0*   < > 20.0*   BUN mg/dL 16 15 14   < > 13   CREATININE mg/dL 1.34* 1.23 1.19   < > 1.19   GLUCOSE mg/dL 100* 119* 153*   < > 100*    < > = values in this interval not displayed.     Results from last 7 days   Lab Units 09/02/21 0603 09/01/21 2346 09/01/21  1737 09/01/21  1235 09/01/21  1235   CALCIUM mg/dL 8.7 8.3* 8.1*   < > 8.3*   MAGNESIUM mg/dL 1.8  --   --   --  1.8   PHOSPHORUS mg/dL 3.5  --   --   --   --     < > = values in this interval not displayed.     Results from last 7 days   Lab Units 09/02/21  0603 09/01/21  1235    AST (SGOT) U/L 68* 63*   ALT (SGPT) U/L 37 36   BILIRUBIN mg/dL 1.1 1.1   ALK PHOS U/L 103 100     Coagulation Labs:  Results from last 7 days   Lab Units 09/02/21  0603 09/01/21  1235   FIBRINOGEN mg/dL 394 419      Cardiac Labs:  Results from last 7 days   Lab Units 09/02/21  0603 09/01/21  1235   PROBNP pg/mL 2,578.0* 2,959.0*   TROPONIN T ng/mL <0.010 <0.010     Biomarkers:  Results from last 7 days   Lab Units 09/02/21  0603 09/01/21  1737 09/01/21  1235   CRP mg/dL  --  0.87* 0.76*   PROCALCITONIN ng/mL 0.11  --  0.10   LACTATE mmol/L  --   --  1.4   LDH U/L  --  404* 384*   D DIMER QUANT MCGFEU/mL 0.90*  --  0.87*   FERRITIN ng/mL  --  1,078.00* 1,057.00*     Hyponatremia Evaluation:    Lab Results   Component Value Date     (L) 09/02/2021    BUN 16 09/02/2021    GLUCOSE 100 (H) 09/02/2021    ALBUMIN 3.50 09/02/2021    OSMOLALITYSE 257 (L) 09/01/2021       Lab Results   Lab Value Date/Time    NAUR 57 09/01/2021 2021    OSMOUR 220 (L) 09/01/2021 2021       Interleukin:      COVID-19  Lab Results   Lab Value Date/Time    COVID19 Detected (C) 09/01/2021 1239       Arterial Blood Gases:  Results from last 7 days   Lab Units 09/01/21  1255   PH, ARTERIAL pH units 7.432   PCO2, ARTERIAL mm Hg 29.9*   PO2 ART mm Hg 54.7*   FIO2 % 32       Images:  CT Chest Without Contrast Diagnostic    Result Date: 9/1/2021  Patchy groundglass opacity seen with some interseptal thickening suggesting findings typical for Covid-19 pneumonia. No pleural effusion with reactive lymph nodes in the mediastinum and hilar regions.  D:  09/01/2021 E:  09/01/2021  This report was finalized on 9/1/2021 5:04 PM by Dr. Shawnee Brenner MD.      XR Chest 1 View    Result Date: 9/2/2021  Stable bibasilar pulmonary interstitial disease.  D:  09/02/2021 E:  09/02/2021         XR Chest 1 View    Result Date: 9/1/2021  Heart is enlarged with increased pulmonary vascularity bilaterally and increased markings superimposed in the periphery in  the mid and lower lung field. Superimposed infiltrate is of concern. Clinical correlation needed.  D:  2021 E:  2021  This report was finalized on 2021 5:04 PM by Dr. Shawnee Brenner MD.        Echo:      Results: Reviewed.  I reviewed the patient's new laboratory and imaging results.  I independently reviewed the patient's new images.    Medications: Reviewed.    Assessment/Plan   A / P     Assessment:    Morgan, admitted on 2021 with increasing dyspnea:      Covid-19 Pneumonitis, Severe as SpO2 < 94% on ambient air.  Acute, respiratory failure, type 1   On 3 lpm nasal cannula on admission. At present, he requires 3-4 lpm nasal cannula.  Vaccinated against Covid, Pfizer, second dose: 2021  Treatment: DEX and RDV  Hyponatremia  R/O Secondary to CHF. However Padmaja was > 20 mEq/L    Results from last 7 days   Lab Units 21  0603 21  2346 21  1737 21  1235   SODIUM mmol/L 122* 118* 115* 116*       Pulmonary  COPD  CWP  Former Tobacco use  Cardiac  CAD s/p CABG  HTN  Dyslipidemia on statin  Dilated cardiomyopathy  Anticoagulation with APIxaban   S/P ICD  GERD  Diarrhea X 2 weeks PTA } None since admission  Antibiotics: None  Obesity I. Body mass index is 32.11 kg/m².   At risk for DM (pre-diabetes) based on his HbA1c. [HbA1C 5.7%-6.4%, eA-139 mg/dL].         Lab Results   Lab Value Date/Time    HGBA1C 5.90 (H) 2021 1235       Advance Directives: Code Status and Medical Interventions:   Ordered at: 21     Code Status:    CPR     Medical Interventions (Level of Support Prior to Arrest):    Full        Plan:    He achieved good diuresis last night. sCr went up some. Na improving within goals.  Continue DEX and RDV. He continue on O2 per N/C. No indications for TCZ or Baricitinib.  Bumex today.  Follow up Na levels and see if need for another dose of Tolvaptan.  Next dose of Tolvaptan - if needed - would be tonight ~ 8 pm  Disposition: Keep in ICU.    Labs and CXR in AM    Plan of care and goals reviewed during interdisciplinary rounds.  I discussed the patient's findings and my recommendations with patient and nursing staff    Level of Risk is High due to:  illness with threat to life or bodily function.     Time: was greater than 35 minutes.    (This excludes time spent performing separately reportable procedures and services).  Patient was at high risk of imminent or life-threatening deterioration in his condition due to Respiratory Failure and hyponatremia.     [x]  Primary Attending  []  Consultant

## 2021-09-02 NOTE — PLAN OF CARE
Problem: Adult Inpatient Plan of Care  Goal: Plan of Care Review  Outcome: Ongoing, Progressing  Flowsheets (Taken 9/2/2021 0454)  Progress: no change  Plan of Care Reviewed With: patient  Outcome Summary: Patient admitted from ED with Covid 19, Hyponatremia, and fluid volume overload. Diuretics given in ED. Remdesevir given. Q6 BMPs, as well as multiple labs this am. ON 4L NC. Paced on the monitor, with frequent PVCs. Adequated UOP. Will continue to monitor.   Goal Outcome Evaluation:  Plan of Care Reviewed With: patient        Progress: no change  Outcome Summary: Patient admitted from ED with Covid 19, Hyponatremia, and fluid volume overload. Diuretics given in ED. Remdesevir given. Q6 BMPs, as well as multiple labs this am. ON 4L NC. Paced on the monitor, with frequent PVCs. Adequated UOP. Will continue to monitor.

## 2021-09-02 NOTE — PLAN OF CARE
Problem: Fall Injury Risk  Goal: Absence of Fall and Fall-Related Injury  Outcome: Ongoing, Progressing  Intervention: Promote Injury-Free Environment  Recent Flowsheet Documentation  Taken 9/2/2021 1800 by Ilda Mcleod RN  Safety Promotion/Fall Prevention: safety round/check completed  Taken 9/2/2021 0800 by Ilda Mcleod RN  Safety Promotion/Fall Prevention: safety round/check completed   Goal Outcome Evaluation:              Outcome Summary: Pt remains on 4 liters NC with no complaints of SOA. Pt does get fatiqued with an exertion.Serial labs continue with improvement to Sodium level 122-126 . Magnesium replaced. Bumex given with over 2 liters of UOP. Pt is paced . Tolerating diet. Up to chair for most of the daY.Back to bed with assist of 1. VSS., will continue to monitor.

## 2021-09-02 NOTE — PROGRESS NOTES
"                  Clinical Nutrition     Reason for Visit: MST score 2+, Unintentional weight loss, Reduced oral intake    Patient Name: Morgan Luna  YOB: 1950  MRN: [id number]  Date of Encounter: 09/02/21 09:32 EDT  Admission date: 9/1/2021    Nutrition Assessment   Admission Problem List:    C-19 Pneumonitis  CHF    Heart failure with reduced ejection fraction (CMS/HCC)    Hyponatremia     Volume overload     Diarrhea (onset prior to admission)     Applicable PMH:    PMH:   He  has a past medical history of Acid reflux, Anxiety, Anxiety, Black lung disease (CMS/HCC), COPD (chronic obstructive pulmonary disease) (CMS/[facility]), Current every day smoker, Hypercholesteremia, Hypertension, Ischemic cardiomyopathy (01/15/2015), and Presence of combination internal cardiac defibrillator (ICD) and pacemaker.  PSxH:  He  has a past surgical history that includes Coronary artery bypass graft; Back surgery; and Other surgical history (2015).    Reported/Observed/Food/Nutrition Related History:     COVID sx started about two weeks ago; main sx diarrhea.      Patient reports diarrhea has subsided since admission. He feels he is eating better. He is not sure about weight loss.   Menu options reviewed for today's meals     Anthropometrics   Height: Height: 182.9 cm (72\")  Weight: Weight: 107 kg (236 lb 12.4 oz) (09/02/21 0600)  BMI: BMI (Calculated): 32.1  BMI classification: Obese Class I: 30-34.9kg/m2   IBW: 178#     UBW: 240#   Weight change: patient not sure about weight changes.  He feels he might have lost some weight in the past two weeks with on going diarrhea.     Weight Weight (kg) Weight (lbs) Weight Method VISIT REPORT   9/2/2021 107.4 kg 236 lb 12.4 oz - -   9/1/2021 107.4 kg 236 lb 12.4 oz Bed scale -   9/1/2021 111.131 kg 245 lb Stated -   7/22/2021 112.492 kg 248 lb - Report   4/8/2021 117.028 kg 258 lb - Report   7/23/2020 112.492 kg 248 lb - Report   4/21/2020 111.585 kg 246 lb - Report   10/10/2019 " 112.492 kg 248 lb - Report   3/19/2019 113.762 kg 250 lb 12.8 oz - Report   8/23/2018 112.038 kg 247 lb - Report   2/8/2018 111.54 kg 245 lb 14.4 oz - Report   5/25/2017 112.22 kg 247 lb 6.4 oz - Report   10/19/2016 104.327 kg 230 lb - Report       Labs reviewed     Results from last 7 days   Lab Units 09/02/21  0603   SODIUM mmol/L 122*   POTASSIUM mmol/L 4.7   CHLORIDE mmol/L 88*   CO2 mmol/L 21.0*   BUN mg/dL 16   CREATININE mg/dL 1.34*   CALCIUM mg/dL 8.7   BILIRUBIN mg/dL 1.1   ALK PHOS U/L 103   ALT (SGPT) U/L 37   AST (SGOT) U/L 68*   GLUCOSE mg/dL 100*           Lab Results   Lab Value Date/Time    HGBA1C 5.90 (H) 09/01/2021 1235       Medications reviewed   Pertinent:  Decadron, Remdesivir,     Intake/Ouptut 24 hrs (7:00AM - 6:59 AM)     Intake & Output (last day)       09/01 0701 - 09/02 0700 09/02 0701 - 09/03 0700    IV Piggyback 270     Total Intake(mL/kg) 270 (2.5)     Urine (mL/kg/hr) 2850 300 (1.1)    Total Output 2850 300    Net -2580 -300                Nutrition Focused Physical Exam Findings     Unable to perform exam due to: COVID isolation       Current Nutrition Prescription     PO: Diet Regular; Cardiac, Consistent Carbohydrate  Oral Nutrition Supplement:     Evaluation of Received Nutrient/Fluid Intake:  Insufficient data  Patient reports he ate well for breakfast meal      Nutrition Diagnosis   Date: 9/3 Updated:   Problem Predicted suboptimal energy intake   Etiology COVID virus    Signs/Symptoms isuf intake    Status:     Nutrition Intervention   1.  Follow treatment progress, Care plan reviewed, Interview for preferences, Menu provided, Encourage intake   2. Patient reports good appetite, will continue to monitor intake and need for ONS.       Goal:   General: Nutrition support treatment  PO: Establish PO    Additional goals:  Consume >67% of meals     Monitoring/Evaluation:   Per protocol, PO intake, Pertinent labs    Will Continue to follow per protocol      Erin Viveros RD  Time  Spent: 30min

## 2021-09-02 NOTE — PLAN OF CARE
Goal Outcome Evaluation:  Plan of Care Reviewed With: patient        Progress: no change  Outcome Summary: Patient admitted from ED with Covid 19, Hyponatremia, and fluid volume overload. Diuretics given in ED. Remdesevir given. Q6 BMPs, as well as multiple labs this am. ON 4L NC. Paced on the monitor, with frequent PVCs. Adequated UOP. Will continue to monitor.

## 2021-09-03 ENCOUNTER — APPOINTMENT (OUTPATIENT)
Dept: GENERAL RADIOLOGY | Facility: HOSPITAL | Age: 71
End: 2021-09-03

## 2021-09-03 LAB
ANION GAP SERPL CALCULATED.3IONS-SCNC: 13 MMOL/L (ref 5–15)
BASOPHILS # BLD AUTO: 0.01 10*3/MM3 (ref 0–0.2)
BASOPHILS NFR BLD AUTO: 0.1 % (ref 0–1.5)
BUN SERPL-MCNC: 23 MG/DL (ref 8–23)
BUN/CREAT SERPL: 17.2 (ref 7–25)
CALCIUM SPEC-SCNC: 8.6 MG/DL (ref 8.6–10.5)
CHLORIDE SERPL-SCNC: 92 MMOL/L (ref 98–107)
CO2 SERPL-SCNC: 24 MMOL/L (ref 22–29)
CREAT SERPL-MCNC: 1.34 MG/DL (ref 0.76–1.27)
CRP SERPL-MCNC: 1.61 MG/DL (ref 0–0.5)
D DIMER PPP FEU-MCNC: 0.73 MCGFEU/ML (ref 0–0.56)
DEPRECATED RDW RBC AUTO: 43.6 FL (ref 37–54)
EOSINOPHIL # BLD AUTO: 0 10*3/MM3 (ref 0–0.4)
EOSINOPHIL NFR BLD AUTO: 0 % (ref 0.3–6.2)
ERYTHROCYTE [DISTWIDTH] IN BLOOD BY AUTOMATED COUNT: 13.6 % (ref 12.3–15.4)
FERRITIN SERPL-MCNC: 993.6 NG/ML (ref 30–400)
GFR SERPL CREATININE-BSD FRML MDRD: 52 ML/MIN/1.73
GLUCOSE SERPL-MCNC: 98 MG/DL (ref 65–99)
HCT VFR BLD AUTO: 41.9 % (ref 37.5–51)
HGB BLD-MCNC: 14.3 G/DL (ref 13–17.7)
IMM GRANULOCYTES # BLD AUTO: 0.07 10*3/MM3 (ref 0–0.05)
IMM GRANULOCYTES NFR BLD AUTO: 0.6 % (ref 0–0.5)
LDH SERPL-CCNC: 419 U/L (ref 135–225)
LYMPHOCYTES # BLD AUTO: 1.62 10*3/MM3 (ref 0.7–3.1)
LYMPHOCYTES NFR BLD AUTO: 13.7 % (ref 19.6–45.3)
MAGNESIUM SERPL-MCNC: 2.5 MG/DL (ref 1.6–2.4)
MCH RBC QN AUTO: 29.9 PG (ref 26.6–33)
MCHC RBC AUTO-ENTMCNC: 34.1 G/DL (ref 31.5–35.7)
MCV RBC AUTO: 87.5 FL (ref 79–97)
MONOCYTES # BLD AUTO: 0.56 10*3/MM3 (ref 0.1–0.9)
MONOCYTES NFR BLD AUTO: 4.7 % (ref 5–12)
NEUTROPHILS NFR BLD AUTO: 80.9 % (ref 42.7–76)
NEUTROPHILS NFR BLD AUTO: 9.56 10*3/MM3 (ref 1.7–7)
NRBC BLD AUTO-RTO: 0 /100 WBC (ref 0–0.2)
NT-PROBNP SERPL-MCNC: 1385 PG/ML (ref 0–900)
PLATELET # BLD AUTO: 238 10*3/MM3 (ref 140–450)
PMV BLD AUTO: 10.5 FL (ref 6–12)
POTASSIUM SERPL-SCNC: 3.4 MMOL/L (ref 3.5–5.2)
POTASSIUM SERPL-SCNC: 3.9 MMOL/L (ref 3.5–5.2)
RBC # BLD AUTO: 4.79 10*6/MM3 (ref 4.14–5.8)
SODIUM SERPL-SCNC: 129 MMOL/L (ref 136–145)
WBC # BLD AUTO: 11.82 10*3/MM3 (ref 3.4–10.8)

## 2021-09-03 PROCEDURE — 83735 ASSAY OF MAGNESIUM: CPT | Performed by: NURSE PRACTITIONER

## 2021-09-03 PROCEDURE — 84132 ASSAY OF SERUM POTASSIUM: CPT | Performed by: INTERNAL MEDICINE

## 2021-09-03 PROCEDURE — 85379 FIBRIN DEGRADATION QUANT: CPT | Performed by: INTERNAL MEDICINE

## 2021-09-03 PROCEDURE — 99232 SBSQ HOSP IP/OBS MODERATE 35: CPT | Performed by: INTERNAL MEDICINE

## 2021-09-03 PROCEDURE — 83880 ASSAY OF NATRIURETIC PEPTIDE: CPT | Performed by: INTERNAL MEDICINE

## 2021-09-03 PROCEDURE — 86140 C-REACTIVE PROTEIN: CPT | Performed by: INTERNAL MEDICINE

## 2021-09-03 PROCEDURE — 80048 BASIC METABOLIC PNL TOTAL CA: CPT | Performed by: INTERNAL MEDICINE

## 2021-09-03 PROCEDURE — 85025 COMPLETE CBC W/AUTO DIFF WBC: CPT | Performed by: INTERNAL MEDICINE

## 2021-09-03 PROCEDURE — 99231 SBSQ HOSP IP/OBS SF/LOW 25: CPT | Performed by: INTERNAL MEDICINE

## 2021-09-03 PROCEDURE — 71045 X-RAY EXAM CHEST 1 VIEW: CPT

## 2021-09-03 PROCEDURE — 25010000002 DEXAMETHASONE PER 1 MG: Performed by: INTERNAL MEDICINE

## 2021-09-03 PROCEDURE — 83615 LACTATE (LD) (LDH) ENZYME: CPT | Performed by: INTERNAL MEDICINE

## 2021-09-03 PROCEDURE — 82728 ASSAY OF FERRITIN: CPT | Performed by: INTERNAL MEDICINE

## 2021-09-03 RX ORDER — POTASSIUM CHLORIDE 750 MG/1
40 CAPSULE, EXTENDED RELEASE ORAL ONCE
Status: COMPLETED | OUTPATIENT
Start: 2021-09-03 | End: 2021-09-03

## 2021-09-03 RX ORDER — BUMETANIDE 0.25 MG/ML
2.5 INJECTION INTRAMUSCULAR; INTRAVENOUS ONCE
Status: COMPLETED | OUTPATIENT
Start: 2021-09-03 | End: 2021-09-03

## 2021-09-03 RX ADMIN — APIXABAN 5 MG: 5 TABLET, FILM COATED ORAL at 20:23

## 2021-09-03 RX ADMIN — Medication 10 MG: at 20:23

## 2021-09-03 RX ADMIN — APIXABAN 5 MG: 5 TABLET, FILM COATED ORAL at 09:14

## 2021-09-03 RX ADMIN — POTASSIUM CHLORIDE 40 MEQ: 750 CAPSULE, EXTENDED RELEASE ORAL at 06:27

## 2021-09-03 RX ADMIN — METOPROLOL SUCCINATE 25 MG: 25 TABLET, EXTENDED RELEASE ORAL at 09:14

## 2021-09-03 RX ADMIN — BUMETANIDE 2.5 MG: 0.25 INJECTION, SOLUTION INTRAMUSCULAR; INTRAVENOUS at 15:47

## 2021-09-03 RX ADMIN — DEXAMETHASONE SODIUM PHOSPHATE 6 MG: 4 INJECTION, SOLUTION INTRA-ARTICULAR; INTRALESIONAL; INTRAMUSCULAR; INTRAVENOUS; SOFT TISSUE at 09:15

## 2021-09-03 RX ADMIN — FAMOTIDINE 20 MG: 20 TABLET, FILM COATED ORAL at 09:14

## 2021-09-03 RX ADMIN — ISOSORBIDE DINITRATE 20 MG: 20 TABLET ORAL at 09:14

## 2021-09-03 RX ADMIN — TAMSULOSIN HYDROCHLORIDE 0.4 MG: 0.4 CAPSULE ORAL at 20:23

## 2021-09-03 RX ADMIN — ATORVASTATIN CALCIUM 10 MG: 10 TABLET, FILM COATED ORAL at 20:23

## 2021-09-03 RX ADMIN — REMDESIVIR 100 MG: 100 INJECTION, POWDER, LYOPHILIZED, FOR SOLUTION INTRAVENOUS at 13:17

## 2021-09-03 RX ADMIN — ISOSORBIDE DINITRATE 20 MG: 20 TABLET ORAL at 18:07

## 2021-09-03 NOTE — PLAN OF CARE
Goal Outcome Evaluation:  Plan of Care Reviewed With: patient        Progress: no change  Outcome Summary: Pt remains on 4L nc without any resp issues.Na 129, increasing. WBC and CRP also increasing. BNP down 1385. 1000 UOP + 1 occ. Pt did have several runs of vtach. K replaced this am.

## 2021-09-03 NOTE — CASE MANAGEMENT/SOCIAL WORK
Continued Stay Note  [facility]     Patient Name: Morgan Luna  MRN: [id number]  Today's Date: 9/3/2021    Admit Date: 9/1/2021    Discharge Plan     Row Name 09/03/21 0571       Plan    Plan  Home    Plan Comments  Patient in isolation, currently on nasal cannula at 4L.  Patient independent PTA.  Plan is home upon discharge.  Patient has home oxygen. Attempted to call wife about patients home oxygen and her mailbox is not set up, also tried home number and received message x 2 that call could not be completed as dialed.  Attempted to speak with patient over the phone but he was on phone with his wife.  Patients RN in room at the time and confirmed with patients wife over the phone that his oxygen is provide by [employer]  Rn also relayed message to wife that they will need to have a full portable tank when patient was ready for discharge. CM will continue to follow.        Discharge Codes    No documentation.       Expected Discharge Date and Time     Expected Discharge Date Expected Discharge Time    Sep 9, 2021             Fatmata Ramos RN

## 2021-09-03 NOTE — PROGRESS NOTES
INTENSIVIST   HOSPITAL VISIT NOTE     Hospital:  LOS: 2 days     Subjective   S     Mr. Morgan Luna, 71 y.o. male is followed for:Shortness of Breath       C-19 Pneumonitis  CHF    Heart failure with reduced ejection fraction (CMS/HCC)    As an Intensivist, we provide an integrated approach to the ICU patient and family, medical management of comorbid conditions, including but not limited to electrolytes, glycemic control, organ dysfunction, lead interdisciplinary rounds and coordinate the care with all other services, including those from other specialists.     Interval History:  Back hurts. (chronic problem).    On 4 lpm N/C.    He feels better.    Temp  Min: 97 °F (36.1 °C)  Max: 98.2 °F (36.8 °C)       The patient's relevant past medical, surgical and social history were reviewed and updated in Epic as appropriate.      History     Last Reviewed by Baldo Cohn MD on 9/1/2021 at  2:07 PM    Sections Reviewed    Medical, Family, Surgical, Tobacco, Alcohol, Drug Use, Sexual Activity,   Social Documentation      Problem list reviewed by Baldo Cohn MD on 9/1/2021 at  2:07 PM  Allergies reviewed by Baldo Cohn MD on 9/1/2021 at  2:07 PM       Objective   O     Vitals:  Temp: 97.6 °F (36.4 °C) (09/03/21 1200) Temp  Min: 97 °F (36.1 °C)  Max: 98.2 °F (36.8 °C)   Temp core:      BP: 132/70 (09/03/21 1300) BP  Min: 101/55  Max: 135/76   Pulse: 85 (09/03/21 1300) Pulse  Min: 76  Max: 101   Resp: 20 (09/03/21 1300) Resp  Min: 20  Max: 22   SpO2: 92 % (09/03/21 1300) SpO2  Min: 85 %  Max: 93 %   Device: humidified, nasal cannula (09/03/21 1400)    Flow Rate: 4 (09/03/21 1400) Flow (L/min)  Min: 4  Max: 4     Intake/Ouptut 24 hrs (7:00AM - 6:59 AM)    Intake & Output (last 3 days)       08/31 0701 - 09/01 0700 09/01 0701 - 09/02 0700 09/02 0701 - 09/03 0700 09/03 0701 - 09/04 0700    P.O.   910 360    I.V. (mL/kg)   111 (1)     IV Piggyback  270      Total Intake(mL/kg)  270 (2.5) 1021 (9.5) 360 (3.4)    Urine  (mL/kg/hr)  2850 3350 (1.3) 600 (0.7)    Stool   0     Total Output  2850 3350 600    Net  -6177 -2643 -867            Urine Unmeasured Occurrence   1 x     Stool Unmeasured Occurrence   1 x             Physical Examination    Telemetry:  Rhythm: paced rhythm (09/03/21 1400)         Constitutional:  No acute distress.   Head: Normocephalic.   ENMT: No oral lesions.   Neck: No adenopathy. Supple.  Trachea midline.   Cardiovascular: RRR.   Normal heart sounds.  No murmurs, gallop or rub.   Respiratory: Normal breath sounds  Crackles.   Abdominal:  Soft with no tenderness.  No distension.   No HSM.   Extremities: Warm.  Dry.  No cyanosis.  Edema   Neurological/Psych:   Alert, Oriented, Cooperative.  Best Eye Response: 4-->(E4) spontaneous (09/03/21 1400)  Best Motor Response: 6-->(M6) obeys commands (09/03/21 1400)  Best Verbal Response: 5-->(V5) oriented (09/03/21 1400)  Simon Coma Scale Score: 15 (09/03/21 1400)     Results Reviewed:  Laboratory  Microbiology  Radiology  Pathology    Hematology:  Results from last 7 days   Lab Units 09/03/21  0305 09/02/21  0603 09/01/21  1235   WBC 10*3/mm3 11.82* 7.43 6.53   HEMOGLOBIN g/dL 14.3 13.9 13.3   PLATELETS 10*3/mm3 238 206 181   NEUTROS ABS 10*3/mm3 9.56* 5.45 4.67   LYMPHS ABS 10*3/mm3 1.62 1.52 1.42     Chemistry:  Estimated Creatinine Clearance: 62.1 mL/min (A) (by C-G formula based on SCr of 1.34 mg/dL (H)).  Results from last 7 days   Lab Units 09/03/21  0305 09/02/21  1725 09/02/21  1144 09/02/21  0603 09/02/21  0603   SODIUM mmol/L 129* 126* 125*   < > 122*   POTASSIUM mmol/L 3.4* 3.8 3.9   < > 4.7   CHLORIDE mmol/L 92* 89* 90*  --  88*   CO2 mmol/L 24.0 24.0 21.0*   < > 21.0*   BUN mg/dL 23 21 19   < > 16   CREATININE mg/dL 1.34* 1.32* 1.35*   < > 1.34*   GLUCOSE mg/dL 98 130* 106*   < > 100*    < > = values in this interval not displayed.     Results from last 7 days   Lab Units 09/03/21  0305 09/02/21  1725 09/02/21  1144 09/02/21  0603 09/02/21  0603  09/01/21 1737 09/01/21  1235   CALCIUM mg/dL 8.6 8.6 8.7   < > 8.7   < > 8.3*   MAGNESIUM mg/dL 2.5*  --   --   --  1.8  --  1.8   PHOSPHORUS mg/dL  --   --   --   --  3.5  --   --     < > = values in this interval not displayed.     Results from last 7 days   Lab Units 09/02/21 0603 09/01/21  1235   AST (SGOT) U/L 68* 63*   ALT (SGPT) U/L 37 36   BILIRUBIN mg/dL 1.1 1.1   ALK PHOS U/L 103 100       Cardiac Labs:  Results from last 7 days   Lab Units 09/03/21 0305 09/02/21 0603 09/01/21  1235   PROBNP pg/mL 1,385.0* 2,578.0* 2,959.0*   TROPONIN T ng/mL  --  <0.010 <0.010     Biomarkers:  Results from last 7 days   Lab Units 09/03/21  0305 09/02/21 0603 09/01/21 1737 09/01/21  1235   CRP mg/dL 1.61*  --  0.87* 0.76*   PROCALCITONIN ng/mL  --  0.11  --  0.10   LACTATE mmol/L  --   --   --  1.4   LDH U/L 419*  --  404* 384*   D DIMER QUANT MCGFEU/mL 0.73* 0.90*  --  0.87*   FERRITIN ng/mL 993.60*  --  1,078.00* 1,057.00*     Interleukin:  Results from last 7 days   Lab Units 09/01/21  1235   INTERLEUKIN 6 pg/mL 36.5*     COVID-19  Lab Results   Lab Value Date/Time    COVID19 Detected (C) 09/01/2021 1239       Arterial Blood Gases:  Results from last 7 days   Lab Units 09/01/21  1255   PH, ARTERIAL pH units 7.432   PCO2, ARTERIAL mm Hg 29.9*   PO2 ART mm Hg 54.7*   FIO2 % 32       Images:  XR Chest 1 View    Result Date: 9/3/2021  Stable bibasilar interstitial disease. No new chest pathology is seen.   D:  09/03/2021 E:  09/03/2021      XR Chest 1 View    Result Date: 9/2/2021  Stable bibasilar pulmonary interstitial disease.  D:  09/02/2021 E:  09/02/2021     This report was finalized on 9/2/2021 9:29 PM by Dr. Vaughn Stiles MD.        Echo:      Results: Reviewed.  I reviewed the patient's new laboratory and imaging results.  I independently reviewed the patient's new images.    Medications: Reviewed.    Assessment/Plan   A / P     Assessment:    Morgan, admitted on 9/1/2021 with increasing dyspnea:      Covid-19  Pneumonitis, Severe as SpO2 < 94% on ambient air.  Acute, respiratory failure, type 1   On 3 lpm nasal cannula on admission. At present, he requires 3-4 lpm nasal cannula.  Vaccinated against Covid, Pfizer, second dose: 2021  Treatment: DEX and RDV  Hyponatremia  R/O Secondary to CHF. However Padmaja was > 20 mEq/L    Results from last 7 days   Lab Units 21  0305 21  1725 21  1144 21  0603 21  2346 21  1737 21  1235   SODIUM mmol/L 129* 126* 125* 122* 118* 115* 116*       Pulmonary  COPD  CWP  Former Tobacco use  Cardiac  CAD s/p CABG  HTN  Dyslipidemia on statin  Dilated cardiomyopathy  Anticoagulation with APIxaban   S/P ICD  GERD  Diarrhea X 2 weeks PTA } None since admission  Antibiotics: None  Obesity I. Body mass index is 32.11 kg/m².   At risk for DM (pre-diabetes) based on his HbA1c. [HbA1C 5.7%-6.4%, eA-139 mg/dL].     Results from last 7 days   Lab Units 21   GLUCOSE mg/dL 129     Lab Results   Lab Value Date/Time    HGBA1C 5.90 (H) 2021 1235       Advance Directives: Code Status and Medical Interventions:   Ordered at: 21     Code Status:    CPR     Medical Interventions (Level of Support Prior to Arrest):    Full        Plan:    ProBNP decreasing. Good diuresis. sCr stable at 1.34  Continue DEX and RDV. He continue on O2 per N/C. No indications for TCZ or Baricitinib.  Another dose of Bumex today.  Na improving. No further doses of Tolvaptan at this point (he receive one dose).  ECHO per Cardiology  Disposition: Transfer to Telemetry Unit   Labs in AM    Plan of care and goals reviewed during interdisciplinary rounds.  I discussed the patient's findings and my recommendations with patient and nursing staff    Level of Risk is High due to:  illness with threat to life or bodily function.     OK to floor.    Hospitalist Team will assist with medical management, and assume Primary Attending, once on the Floor..    Thank  you.    [x]  Primary Attending  []  Consultant

## 2021-09-03 NOTE — PROGRESS NOTES
[facility]  IP Progress Note      Chief Complaint/Reason for service: Heart failure with preserved EF to CAD    Subjective   Subjective: The patient states he feels a lot better.  His breathing is improved.  He keeps asking when he can go home.  He denies chest pain    Past medical, surgical, social and family history reviewed in the patient's electronic medical record.    Objective     Vital Sign Min/Max for last 24 hours  Temp  Min: 97 °F (36.1 °C)  Max: 98.2 °F (36.8 °C)   BP  Min: 99/65  Max: 128/69   Pulse  Min: 75  Max: 92   Resp  Min: 20  Max: 22   SpO2  Min: 85 %  Max: 92 %   Flow (L/min)  Min: 4  Max: 4      Intake/Output Summary (Last 24 hours) at 9/3/2021 0809  Last data filed at 9/3/2021 0600  Gross per 24 hour   Intake 1021 ml   Output 3350 ml   Net -2329 ml             Current Facility-Administered Medications:   •  acetaminophen (TYLENOL) tablet 650 mg, 650 mg, Oral, Q4H PRN, Baldo Cohn MD  •  albuterol sulfate HFA (PROVENTIL HFA;VENTOLIN HFA;PROAIR HFA) inhaler 2 puff, 2 puff, Inhalation, Q4H PRN, Baldo Cohn MD  •  apixaban (ELIQUIS) tablet 5 mg, 5 mg, Oral, BID, Baldo Cohn MD, 5 mg at 09/02/21 2042  •  atorvastatin (LIPITOR) tablet 10 mg, 10 mg, Oral, Nightly, aBldo Cohn MD, 10 mg at 09/02/21 2042  •  dexamethasone (DECADRON) injection 6 mg, 6 mg, Intravenous, Daily, Baldo Cohn MD, 6 mg at 09/02/21 0820  •  famotidine (PEPCID) tablet 20 mg, 20 mg, Oral, Daily, Baldo Cohn MD, 20 mg at 09/02/21 0821  •  isosorbide dinitrate (ISORDIL) tablet 20 mg, 20 mg, Oral, BID - Nitrates, Baldo Cohn MD, 20 mg at 09/02/21 1716  •  magnesium sulfate 4g/100mL (PREMIX) infusion, 4 g, Intravenous, PRN, Baldo Cohn MD, 4 g at 09/02/21 0830  •  melatonin tablet 10 mg, 10 mg, Oral, Nightly PRN, Rosemary Jackson, APRN, 10 mg at 09/02/21 2157  •  metoprolol succinate XL (TOPROL-XL) 24 hr tablet 25 mg, 25 mg, Oral, Daily, Baldo Cohn MD, 25 mg at  09/02/21 0821  •  nitroglycerin (NITROSTAT) SL tablet 0.4 mg, 0.4 mg, Sublingual, Q5 Min PRN, Baldo Cohn MD  •  ondansetron (ZOFRAN) injection 4 mg, 4 mg, Intravenous, Q6H PRN, Baldo Cohn MD  •  Pharmacy Consult - Remdesivir, , Does not apply, Continuous PRN, Baldo Cohn MD  •  [COMPLETED] remdesivir 200 mg in sodium chloride 0.9 % 290 mL IVPB (powder vial), 200 mg, Intravenous, Q24H, 200 mg at 09/01/21 2026 **FOLLOWED BY** remdesivir 100 mg in sodium chloride 0.9 % 270 mL IVPB (powder vial), 100 mg, Intravenous, Daily With Lunch, Baldo Cohn MD, 100 mg at 09/02/21 1716  •  sodium chloride 0.9 % flush 10 mL, 10 mL, Intravenous, PRN, Daniel Monique MD  •  tamsulosin (FLOMAX) 24 hr capsule 0.4 mg, 0.4 mg, Oral, Nightly, Baldo Cohn MD, 0.4 mg at 09/02/21 2042    Physical Exam: General obese male in bed sat 89% no overt dyspnea or tachypnea        HEENT: No JVP, nasal O2 is present       Respiratory: Equal bilateral symmetrical expansion with basilar crackles       Cardiovascular: Irregular rate and rhythm with no significant murmur and trace edema       GI: Obese and soft       Lower Extremities: No lesions       Neuro: Facial expressions are symmetrical moves all 4 extremities       Skin: Warm and dry trace edema       Psych: Pleasant affect    Results Review: Output exceeds intake overnight 2.3 L.  Heart rate 70-80.  Blood pressures good.  Sodium is up to 129.  Potassium is 3.4.  GFR is 52.  BNP is 1383 down from 2519    Radiology Results:  Imaging Results (Last 72 Hours)     Procedure Component Value Units Date/Time    XR Chest 1 View [[id number]] Resulted: 09/03/21 0347     Updated: 09/03/21 0417    XR Chest 1 View [[id number]] Collected: 09/02/21 0817     Updated: 09/02/21 2132    Narrative:      EXAMINATION: XR CHEST 1 VW-09/02/2021:     INDICATION: CHF, Covid-19; U07.1-COVID-19; A80-Ruldfzpccl's  pneumoconiosis; J44.1-Chronic obstructive pulmonary disease with (acute)  exacerbation;  J96.11-Chronic respiratory failure with hypoxia;  Z99.81-Dependence on supplemental oxygen; E87.4-Eflz-gusgxfzccs and  hyponatremia; Z72.0-Tobacco use.     COMPARISON: 09/01/2021.     FINDINGS: Left sided triple-lead ICD is again noted. The heart is normal  in size. The vasculature appears normal. Patchy bibasilar interstitial  disease appears stable. The upper lungs remain clear. No pneumothorax or  effusion is seen.       Impression:      Stable bibasilar pulmonary interstitial disease.     D:  09/02/2021  E:  09/02/2021            This report was finalized on 9/2/2021 9:29 PM by Dr. Vaughn Stiles MD.       CT Chest Without Contrast Diagnostic [[id number]] Collected: 09/01/21 1350     Updated: 09/01/21 1708    Narrative:      EXAMINATION: CT CHEST WO CONTRAST DIAGNOSTIC-      INDICATION: COVID Evaluation, Cough, Fever; U07.1-COVID-19;  B44-Qorpmpwsbf's pneumoconiosis; J44.1-Chronic obstructive pulmonary  disease with (acute) exacerbation; J96.11-Chronic respiratory failure  with hypoxia; Z99.81-Dependence on supplemental oxygen;  E87.2-Opqj-zdzenkgkrf and hyponatremia; Z72.0-Tobacco use, shortness of  breath, chest pain.     TECHNIQUE: Multiple axial CT imaging was obtained of the chest without  the administration of intravenous contrast.     The radiation dose reduction device was turned on for each scan per the  ALARA (As Low as Reasonably Achievable) protocol.     COMPARISON: NONE.     FINDINGS: The lung parenchyma reveals emphysematous and chronic changes  seen bilaterally with patchy groundglass opacity seen in the periphery  of the upper mid and lower lung field. Findings typical in appearance  for Covid-19 pneumonia. No pleural effusion or pneumothorax. No  pulmonary mass or nodule. The thyroid is homogeneous. No mediastinal  mass. Reactive adenopathy in the mediastinum and hilar regions. Coronary  artery calcification identified. Cardiac chambers are within normal  limits. No pericardial effusion. The  visualized upper abdomen reveals  stones in the gallbladder.       Impression:      Patchy groundglass opacity seen with some interseptal  thickening suggesting findings typical for Covid-19 pneumonia. No  pleural effusion with reactive lymph nodes in the mediastinum and hilar  regions.     D:  09/01/2021  E:  09/01/2021     This report was finalized on 9/1/2021 5:04 PM by Dr. Shawnee Brenner MD.       XR Chest 1 View [[id number]] Collected: 09/01/21 1330     Updated: 09/01/21 1707    Narrative:      EXAMINATION: XR CHEST 1 VW- 09/01/2021     INDICATION: SOA triage protocol      COMPARISON: NONE     FINDINGS: Portable chest reveals pacer leads in satisfactory position.  Patchy airspace disease seen in the periphery of the mid and lower lung  fields. There is increased pulmonary vascularity bilaterally.  Degenerative changes seen within the spine. Patient status post median  sternotomy.          Impression:      Heart is enlarged with increased pulmonary vascularity  bilaterally and increased markings superimposed in the periphery in the  mid and lower lung field. Superimposed infiltrate is of concern.  Clinical correlation needed.     D:  09/01/2021  E:  09/01/2021     This report was finalized on 9/1/2021 5:04 PM by Dr. Shawnee Brenner MD.             EKG: Paced rhythm    ECHO: Review of echocardiogram is very hard to see the endocardial definition but the EF appears to be preserved and at least around 50%    Tele: A. fib    Assessment   Assessment/Plan: 1 heart failure with preserved EF-the patient had an excellent diuretic response and his BNP is gone down  2 CAD-no angina  3 hyponatremia improved  Mak Lewis MD  09/03/21  08:09 EDT

## 2021-09-04 LAB
ANION GAP SERPL CALCULATED.3IONS-SCNC: 14 MMOL/L (ref 5–15)
BASOPHILS # BLD AUTO: 0.01 10*3/MM3 (ref 0–0.2)
BASOPHILS NFR BLD AUTO: 0.1 % (ref 0–1.5)
BH CV ECHO MEAS - AO MAX PG (FULL): 1.9 MMHG
BH CV ECHO MEAS - AO MAX PG: 3.6 MMHG
BH CV ECHO MEAS - AO MEAN PG (FULL): 1.2 MMHG
BH CV ECHO MEAS - AO MEAN PG: 2.2 MMHG
BH CV ECHO MEAS - AO ROOT AREA (BSA CORRECTED): 1.5
BH CV ECHO MEAS - AO ROOT AREA: 9.8 CM^2
BH CV ECHO MEAS - AO ROOT DIAM: 3.5 CM
BH CV ECHO MEAS - AO V2 MAX: 94.6 CM/SEC
BH CV ECHO MEAS - AO V2 MEAN: 69.9 CM/SEC
BH CV ECHO MEAS - AO V2 VTI: 20.2 CM
BH CV ECHO MEAS - AVA(I,A): 2 CM^2
BH CV ECHO MEAS - AVA(I,D): 2 CM^2
BH CV ECHO MEAS - AVA(V,A): 2.1 CM^2
BH CV ECHO MEAS - AVA(V,D): 2.1 CM^2
BH CV ECHO MEAS - BSA(HAYCOCK): 2.4 M^2
BH CV ECHO MEAS - BSA: 2.3 M^2
BH CV ECHO MEAS - BZI_BMI: 32 KILOGRAMS/M^2
BH CV ECHO MEAS - BZI_METRIC_HEIGHT: 182.9 CM
BH CV ECHO MEAS - BZI_METRIC_WEIGHT: 107.1 KG
BH CV ECHO MEAS - EDV(CUBED): 137.1 ML
BH CV ECHO MEAS - EDV(MOD-SP2): 196 ML
BH CV ECHO MEAS - EDV(MOD-SP4): 192 ML
BH CV ECHO MEAS - EDV(TEICH): 127 ML
BH CV ECHO MEAS - EF(CUBED): 58.1 %
BH CV ECHO MEAS - EF(MOD-BP): 54 %
BH CV ECHO MEAS - EF(MOD-SP2): 37.2 %
BH CV ECHO MEAS - EF(MOD-SP4): 66.1 %
BH CV ECHO MEAS - EF(TEICH): 49.4 %
BH CV ECHO MEAS - ESV(CUBED): 57.5 ML
BH CV ECHO MEAS - ESV(MOD-SP2): 123 ML
BH CV ECHO MEAS - ESV(MOD-SP4): 65 ML
BH CV ECHO MEAS - ESV(TEICH): 64.3 ML
BH CV ECHO MEAS - FS: 25.1 %
BH CV ECHO MEAS - IVS/LVPW: 0.88
BH CV ECHO MEAS - IVSD: 1.2 CM
BH CV ECHO MEAS - LA DIMENSION: 3.6 CM
BH CV ECHO MEAS - LA/AO: 1
BH CV ECHO MEAS - LAD MAJOR: 5.5 CM
BH CV ECHO MEAS - LAT PEAK E' VEL: 6.5 CM/SEC
BH CV ECHO MEAS - LATERAL E/E' RATIO: 8.4
BH CV ECHO MEAS - LV DIASTOLIC VOL/BSA (35-75): 84 ML/M^2
BH CV ECHO MEAS - LV MASS(C)D: 255.5 GRAMS
BH CV ECHO MEAS - LV MASS(C)DI: 111.8 GRAMS/M^2
BH CV ECHO MEAS - LV MAX PG: 1.7 MMHG
BH CV ECHO MEAS - LV MEAN PG: 0.94 MMHG
BH CV ECHO MEAS - LV SYSTOLIC VOL/BSA (12-30): 28.4 ML/M^2
BH CV ECHO MEAS - LV V1 MAX: 64.5 CM/SEC
BH CV ECHO MEAS - LV V1 MEAN: 44.7 CM/SEC
BH CV ECHO MEAS - LV V1 VTI: 13.1 CM
BH CV ECHO MEAS - LVIDD: 5.2 CM
BH CV ECHO MEAS - LVIDS: 3.9 CM
BH CV ECHO MEAS - LVLD AP2: 9.2 CM
BH CV ECHO MEAS - LVLD AP4: 8.2 CM
BH CV ECHO MEAS - LVLS AP2: 8 CM
BH CV ECHO MEAS - LVLS AP4: 7.1 CM
BH CV ECHO MEAS - LVOT AREA (M): 3.1 CM^2
BH CV ECHO MEAS - LVOT AREA: 3 CM^2
BH CV ECHO MEAS - LVOT DIAM: 2 CM
BH CV ECHO MEAS - LVPWD: 1.3 CM
BH CV ECHO MEAS - MED PEAK E' VEL: 7.7 CM/SEC
BH CV ECHO MEAS - MEDIAL E/E' RATIO: 7.1
BH CV ECHO MEAS - MV A MAX VEL: 56.1 CM/SEC
BH CV ECHO MEAS - MV DEC TIME: 0.14 SEC
BH CV ECHO MEAS - MV E MAX VEL: 55.8 CM/SEC
BH CV ECHO MEAS - MV E/A: 0.99
BH CV ECHO MEAS - MV MAX PG: 2.5 MMHG
BH CV ECHO MEAS - MV MEAN PG: 1.4 MMHG
BH CV ECHO MEAS - MV V2 MAX: 79.1 CM/SEC
BH CV ECHO MEAS - MV V2 MEAN: 56.2 CM/SEC
BH CV ECHO MEAS - MV V2 VTI: 22.6 CM
BH CV ECHO MEAS - MVA(VTI): 1.7 CM^2
BH CV ECHO MEAS - PA MAX PG: 3 MMHG
BH CV ECHO MEAS - PA V2 MAX: 86.9 CM/SEC
BH CV ECHO MEAS - SI(AO): 86.9 ML/M^2
BH CV ECHO MEAS - SI(CUBED): 34.8 ML/M^2
BH CV ECHO MEAS - SI(LVOT): 17.3 ML/M^2
BH CV ECHO MEAS - SI(MOD-SP2): 31.9 ML/M^2
BH CV ECHO MEAS - SI(MOD-SP4): 55.6 ML/M^2
BH CV ECHO MEAS - SI(TEICH): 27.4 ML/M^2
BH CV ECHO MEAS - SV(AO): 198.6 ML
BH CV ECHO MEAS - SV(CUBED): 79.6 ML
BH CV ECHO MEAS - SV(LVOT): 39.5 ML
BH CV ECHO MEAS - SV(MOD-SP2): 73 ML
BH CV ECHO MEAS - SV(MOD-SP4): 127 ML
BH CV ECHO MEAS - SV(TEICH): 62.7 ML
BH CV ECHO MEASUREMENTS AVERAGE E/E' RATIO: 7.86
BUN SERPL-MCNC: 28 MG/DL (ref 8–23)
BUN/CREAT SERPL: 20.4 (ref 7–25)
CALCIUM SPEC-SCNC: 8.9 MG/DL (ref 8.6–10.5)
CHLORIDE SERPL-SCNC: 96 MMOL/L (ref 98–107)
CO2 SERPL-SCNC: 24 MMOL/L (ref 22–29)
CREAT SERPL-MCNC: 1.37 MG/DL (ref 0.76–1.27)
CRP SERPL-MCNC: 1.97 MG/DL (ref 0–0.5)
DEPRECATED RDW RBC AUTO: 46 FL (ref 37–54)
EOSINOPHIL # BLD AUTO: 0 10*3/MM3 (ref 0–0.4)
EOSINOPHIL NFR BLD AUTO: 0 % (ref 0.3–6.2)
ERYTHROCYTE [DISTWIDTH] IN BLOOD BY AUTOMATED COUNT: 13.8 % (ref 12.3–15.4)
FERRITIN SERPL-MCNC: 916.8 NG/ML (ref 30–400)
GFR SERPL CREATININE-BSD FRML MDRD: 51 ML/MIN/1.73
GLUCOSE SERPL-MCNC: 93 MG/DL (ref 65–99)
HCT VFR BLD AUTO: 44.4 % (ref 37.5–51)
HGB BLD-MCNC: 15 G/DL (ref 13–17.7)
IMM GRANULOCYTES # BLD AUTO: 0.05 10*3/MM3 (ref 0–0.05)
IMM GRANULOCYTES NFR BLD AUTO: 0.4 % (ref 0–0.5)
LDH SERPL-CCNC: 425 U/L (ref 135–225)
LEFT ATRIUM VOLUME INDEX: 28 ML/M^2
LEFT ATRIUM VOLUME: 64 ML
LYMPHOCYTES # BLD AUTO: 2.01 10*3/MM3 (ref 0.7–3.1)
LYMPHOCYTES NFR BLD AUTO: 17.8 % (ref 19.6–45.3)
MAGNESIUM SERPL-MCNC: 2.3 MG/DL (ref 1.6–2.4)
MAXIMAL PREDICTED HEART RATE: 147 BPM
MCH RBC QN AUTO: 30.5 PG (ref 26.6–33)
MCHC RBC AUTO-ENTMCNC: 33.8 G/DL (ref 31.5–35.7)
MCV RBC AUTO: 90.4 FL (ref 79–97)
MONOCYTES # BLD AUTO: 0.69 10*3/MM3 (ref 0.1–0.9)
MONOCYTES NFR BLD AUTO: 6.1 % (ref 5–12)
NEUTROPHILS NFR BLD AUTO: 75.6 % (ref 42.7–76)
NEUTROPHILS NFR BLD AUTO: 8.55 10*3/MM3 (ref 1.7–7)
NRBC BLD AUTO-RTO: 0 /100 WBC (ref 0–0.2)
NT-PROBNP SERPL-MCNC: 839.4 PG/ML (ref 0–900)
PHOSPHATE SERPL-MCNC: 2.9 MG/DL (ref 2.5–4.5)
PLATELET # BLD AUTO: 277 10*3/MM3 (ref 140–450)
PMV BLD AUTO: 9.9 FL (ref 6–12)
POTASSIUM SERPL-SCNC: 3.4 MMOL/L (ref 3.5–5.2)
RBC # BLD AUTO: 4.91 10*6/MM3 (ref 4.14–5.8)
SODIUM SERPL-SCNC: 134 MMOL/L (ref 136–145)
STRESS TARGET HR: 125 BPM
WBC # BLD AUTO: 11.31 10*3/MM3 (ref 3.4–10.8)

## 2021-09-04 PROCEDURE — 82728 ASSAY OF FERRITIN: CPT | Performed by: INTERNAL MEDICINE

## 2021-09-04 PROCEDURE — 84100 ASSAY OF PHOSPHORUS: CPT | Performed by: INTERNAL MEDICINE

## 2021-09-04 PROCEDURE — 86140 C-REACTIVE PROTEIN: CPT | Performed by: INTERNAL MEDICINE

## 2021-09-04 PROCEDURE — 80048 BASIC METABOLIC PNL TOTAL CA: CPT | Performed by: INTERNAL MEDICINE

## 2021-09-04 PROCEDURE — 83735 ASSAY OF MAGNESIUM: CPT | Performed by: INTERNAL MEDICINE

## 2021-09-04 PROCEDURE — 85025 COMPLETE CBC W/AUTO DIFF WBC: CPT | Performed by: INTERNAL MEDICINE

## 2021-09-04 PROCEDURE — 94640 AIRWAY INHALATION TREATMENT: CPT

## 2021-09-04 PROCEDURE — 83880 ASSAY OF NATRIURETIC PEPTIDE: CPT | Performed by: INTERNAL MEDICINE

## 2021-09-04 PROCEDURE — 63710000001 DEXAMETHASONE PER 0.25 MG: Performed by: INTERNAL MEDICINE

## 2021-09-04 PROCEDURE — 99233 SBSQ HOSP IP/OBS HIGH 50: CPT | Performed by: INTERNAL MEDICINE

## 2021-09-04 PROCEDURE — 93010 ELECTROCARDIOGRAM REPORT: CPT | Performed by: INTERNAL MEDICINE

## 2021-09-04 PROCEDURE — 93005 ELECTROCARDIOGRAM TRACING: CPT | Performed by: INTERNAL MEDICINE

## 2021-09-04 PROCEDURE — 94799 UNLISTED PULMONARY SVC/PX: CPT

## 2021-09-04 PROCEDURE — 99232 SBSQ HOSP IP/OBS MODERATE 35: CPT | Performed by: INTERNAL MEDICINE

## 2021-09-04 PROCEDURE — 83615 LACTATE (LD) (LDH) ENZYME: CPT | Performed by: INTERNAL MEDICINE

## 2021-09-04 RX ORDER — BUDESONIDE AND FORMOTEROL FUMARATE DIHYDRATE 80; 4.5 UG/1; UG/1
2 AEROSOL RESPIRATORY (INHALATION)
Status: DISCONTINUED | OUTPATIENT
Start: 2021-09-04 | End: 2021-09-06 | Stop reason: HOSPADM

## 2021-09-04 RX ORDER — POTASSIUM CHLORIDE 1.5 G/1.77G
40 POWDER, FOR SOLUTION ORAL AS NEEDED
Status: DISCONTINUED | OUTPATIENT
Start: 2021-09-04 | End: 2021-09-06 | Stop reason: HOSPADM

## 2021-09-04 RX ORDER — POTASSIUM CHLORIDE 7.45 MG/ML
10 INJECTION INTRAVENOUS
Status: DISCONTINUED | OUTPATIENT
Start: 2021-09-04 | End: 2021-09-06 | Stop reason: HOSPADM

## 2021-09-04 RX ORDER — POTASSIUM CHLORIDE 750 MG/1
40 CAPSULE, EXTENDED RELEASE ORAL AS NEEDED
Status: DISCONTINUED | OUTPATIENT
Start: 2021-09-04 | End: 2021-09-06 | Stop reason: HOSPADM

## 2021-09-04 RX ORDER — MAGNESIUM SULFATE HEPTAHYDRATE 40 MG/ML
4 INJECTION, SOLUTION INTRAVENOUS AS NEEDED
Status: DISCONTINUED | OUTPATIENT
Start: 2021-09-04 | End: 2021-09-06 | Stop reason: HOSPADM

## 2021-09-04 RX ORDER — MAGNESIUM SULFATE 1 G/100ML
1 INJECTION INTRAVENOUS AS NEEDED
Status: DISCONTINUED | OUTPATIENT
Start: 2021-09-04 | End: 2021-09-06 | Stop reason: HOSPADM

## 2021-09-04 RX ORDER — MAGNESIUM SULFATE HEPTAHYDRATE 40 MG/ML
2 INJECTION, SOLUTION INTRAVENOUS AS NEEDED
Status: DISCONTINUED | OUTPATIENT
Start: 2021-09-04 | End: 2021-09-06 | Stop reason: HOSPADM

## 2021-09-04 RX ADMIN — Medication 10 MG: at 21:44

## 2021-09-04 RX ADMIN — REMDESIVIR 100 MG: 100 INJECTION, POWDER, LYOPHILIZED, FOR SOLUTION INTRAVENOUS at 13:52

## 2021-09-04 RX ADMIN — APIXABAN 5 MG: 5 TABLET, FILM COATED ORAL at 09:55

## 2021-09-04 RX ADMIN — ISOSORBIDE DINITRATE 20 MG: 20 TABLET ORAL at 18:15

## 2021-09-04 RX ADMIN — BUDESONIDE AND FORMOTEROL FUMARATE DIHYDRATE 2 PUFF: 80; 4.5 AEROSOL RESPIRATORY (INHALATION) at 21:08

## 2021-09-04 RX ADMIN — ACETAMINOPHEN 650 MG: 325 TABLET, FILM COATED ORAL at 18:15

## 2021-09-04 RX ADMIN — DEXAMETHASONE 6 MG: 2 TABLET ORAL at 09:55

## 2021-09-04 RX ADMIN — FAMOTIDINE 20 MG: 20 TABLET, FILM COATED ORAL at 09:55

## 2021-09-04 RX ADMIN — METOPROLOL SUCCINATE 25 MG: 25 TABLET, EXTENDED RELEASE ORAL at 09:54

## 2021-09-04 RX ADMIN — SODIUM CHLORIDE, PRESERVATIVE FREE 10 ML: 5 INJECTION INTRAVENOUS at 21:44

## 2021-09-04 RX ADMIN — POTASSIUM CHLORIDE 40 MEQ: 750 CAPSULE, EXTENDED RELEASE ORAL at 18:15

## 2021-09-04 RX ADMIN — ISOSORBIDE DINITRATE 20 MG: 20 TABLET ORAL at 09:55

## 2021-09-04 RX ADMIN — ATORVASTATIN CALCIUM 10 MG: 10 TABLET, FILM COATED ORAL at 21:44

## 2021-09-04 RX ADMIN — SODIUM CHLORIDE, PRESERVATIVE FREE 10 ML: 5 INJECTION INTRAVENOUS at 09:55

## 2021-09-04 RX ADMIN — APIXABAN 5 MG: 5 TABLET, FILM COATED ORAL at 21:44

## 2021-09-04 RX ADMIN — TAMSULOSIN HYDROCHLORIDE 0.4 MG: 0.4 CAPSULE ORAL at 21:44

## 2021-09-04 NOTE — PROGRESS NOTES
"  [facility] at [facility]   Inpatient Progress Note       LOS: 3 days   Patient Care Team:  Nini Jhaveri, Risa Staton MD as PCP - General    Chief Complaint: Heart failure with preserved EF to CAD    Subjective     Interval History:         Review of Systems:   Pertinent positives noted in history, exam, and assessment. Otherwise reviewed and negative.      Objective     Vitals:  Blood pressure 127/72, pulse 103, temperature 97.9 °F (36.6 °C), temperature source Oral, resp. rate 22, height 182.9 cm (72\"), weight 104 kg (228 lb 9.6 oz), SpO2 92 %.     Intake/Output Summary (Last 24 hours) at 9/4/2021 0959  Last data filed at 9/3/2021 1808  Gross per 24 hour   Intake 520 ml   Output 1450 ml   Net -930 ml     Physical Exam       Results Review:     I reviewed the patient's new clinical results.    Results from last 7 days   Lab Units 09/03/21  0305   WBC 10*3/mm3 11.82*   HEMOGLOBIN g/dL 14.3   HEMATOCRIT % 41.9   PLATELETS 10*3/mm3 238     Results from last 7 days   Lab Units 09/03/21  1445 09/03/21  0305 09/03/21  0305 09/02/21  1144 09/02/21  0603   SODIUM mmol/L  --   --  129*   < > 122*   POTASSIUM mmol/L 3.9   < > 3.4*   < > 4.7   CHLORIDE mmol/L  --   --  92*   < > 88*   CO2 mmol/L  --   --  24.0   < > 21.0*   BUN mg/dL  --   --  23   < > 16   CREATININE mg/dL  --   --  1.34*   < > 1.34*   CALCIUM mg/dL  --   --  8.6   < > 8.7   BILIRUBIN mg/dL  --   --   --   --  1.1   ALK PHOS U/L  --   --   --   --  103   ALT (SGPT) U/L  --   --   --   --  37   AST (SGOT) U/L  --   --   --   --  68*   GLUCOSE mg/dL  --   --  98   < > 100*    < > = values in this interval not displayed.     Results from last 7 days   Lab Units 09/03/21  1445 09/03/21  0305 09/03/21  0305   SODIUM mmol/L  --   --  129*   POTASSIUM mmol/L 3.9   < > 3.4*   CHLORIDE mmol/L  --   --  92*   CO2 mmol/L  --   --  24.0   BUN mg/dL  --   --  23   CREATININE mg/dL  --   --  1.34*   GLUCOSE mg/dL  --   --  98   CALCIUM mg/dL  --   " --  8.6    < > = values in this interval not displayed.         No results found for: TROPONINI  Results from last 7 days   Lab Units 09/01/21  1235   TSH uIU/mL 1.260         Results from last 7 days   Lab Units 09/03/21  0305   PROBNP pg/mL 1,385.0*       Results for orders placed during the hospital encounter of 09/01/21    Adult Transthoracic Echo Complete W/ Cont if Necessary Per Protocol    Interpretation Summary  · The study is technically suboptimal for diagnosis. The quality of the study is limited due to patient body habitus, patient positioning and lung disease.  · Left ventricular ejection fraction appears to be 46 - 50%. Left ventricular systolic function is low normal.  · Left ventricular wall thickness is consistent with mild concentric hypertrophy.  · The following left ventricular wall segments are dyskinetic: apex.      XR Chest 1 View    Result Date: 9/3/2021  Stable bibasilar interstitial disease. No new chest pathology is seen.   D:  09/03/2021 E:  09/03/2021  This report was finalized on 9/3/2021 9:57 PM by Dr. Vaughn Stiles MD.          Assessment/Plan       C-19 Pneumonitis  CHF    Heart failure with reduced ejection fraction (CMS/HCC)    TELE: A fib    Plan:  Assessment/Plan:   1 heart failure with preserved EF-the patient had an excellent diuretic response and his BNP is gone down  2 CAD-no angina  3 hyponatremia improved    Vinayak Novoa PA-C for Dr. Romario Jack M.D. [facility]

## 2021-09-04 NOTE — PLAN OF CARE
Goal Outcome Evaluation:  Plan of Care Reviewed With: patient           Outcome Summary: Pt a/o, VSS, 3 LNC, Sat. 92-94%, c/o pain addressed with PRN meds. Continue monitoring.

## 2021-09-04 NOTE — PROGRESS NOTES
[facility]  PROGRESS NOTE    Patient Name: Morgan Luna  : 1950  MRN: [id number]    Date of Admission: 2021  Primary Care Physician: Risa Morales MD    Subjective   Subjective     CC:  covid pna, chf exac    HPI:  Breathing a little better. No chest pain. No n/v/d    ROS:  Gen- No fevers, chills  CV- No chest pain, palpitations  Resp- above  GI- No N/V/D, abd pain        Objective   Objective     Vital Signs:   Temp:  [97.4 °F (36.3 °C)-97.9 °F (36.6 °C)] 97.7 °F (36.5 °C)  Heart Rate:  [] 88  Resp:  [20-22] 22  BP: (115-132)/(62-72) 115/68  Flow (L/min):  [4] 4     Physical Exam:  See in full ppe  Constitutional:Alert, oriented x 3, nontoxic appearing, sitting up in bed no distress  Psych:Normal/appropriate affect  HEENT:Ncat, oroph clear, nasal canula in plac3  Neck: neck supple, full range of motion  Neuro: Face symmetric, speech clear, equal , moves all extremities  Cardiac: Rrr; No pretibial pitting edema  Resp: Ctab, normal effort  GI: abd soft, nontender, obese  Skin: No extremity rash  Musculoskeletal/extremities: no cyanosis extremities; no significant ankle edema        Results Reviewed:  LAB RESULTS:      Lab 21  1008 21  0305 21  0603 21  1737 21  1235   WBC 11.31* 11.82* 7.43  --  6.53   HEMOGLOBIN 15.0 14.3 13.9  --  13.3   HEMATOCRIT 44.4 41.9 40.7  --  38.7   PLATELETS 277 238 206  --  181   NEUTROS ABS 8.55* 9.56* 5.45  --  4.67   IMMATURE GRANS (ABS) 0.05 0.07* 0.02  --  0.02   LYMPHS ABS 2.01 1.62 1.52  --  1.42   MONOS ABS 0.69 0.56 0.43  --  0.41   EOS ABS 0.00 0.00 0.00  --  0.00   MCV 90.4 87.5 89.5  --  88.8   CRP 1.97* 1.61*  --  0.87* 0.76*   PROCALCITONIN  --   --  0.11  --  0.10   LACTATE  --   --   --   --  1.4   * 419*  --  404* 384*   D DIMER QUANT  --  0.73* 0.90*  --  0.87*         Lab 21  1008 21  1445 21  0305 21  1725 21  1144  09/02/21  0603 09/02/21  0603 09/01/21  1737 09/01/21  1235   SODIUM 134*  --  129* 126* 125*  --  122*   < > 116*   POTASSIUM 3.4* 3.9 3.4* 3.8 3.9   < > 4.7   < > 3.8   CHLORIDE 96*  --  92* 89* 90*  --  88*   < > 84*   CO2 24.0  --  24.0 24.0 21.0*  --  21.0*   < > 20.0*   ANION GAP 14.0  --  13.0 13.0 14.0  --  13.0   < > 12.0   BUN 28*  --  23 21 19  --  16   < > 13   CREATININE 1.37*  --  1.34* 1.32* 1.35*  --  1.34*   < > 1.19   GLUCOSE 93  --  98 130* 106*  --  100*   < > 100*   CALCIUM 8.9  --  8.6 8.6 8.7  --  8.7   < > 8.3*   MAGNESIUM 2.3  --  2.5*  --   --   --  1.8  --  1.8   PHOSPHORUS 2.9  --   --   --   --   --  3.5  --   --    HEMOGLOBIN A1C  --   --   --   --   --   --   --   --  5.90*   TSH  --   --   --   --   --   --   --   --  1.260    < > = values in this interval not displayed.         Lab 09/02/21  0603 09/01/21  1235   TOTAL PROTEIN 6.8 6.9   ALBUMIN 3.50 3.60   GLOBULIN 3.3 3.3   ALT (SGPT) 37 36   AST (SGOT) 68* 63*   BILIRUBIN 1.1 1.1   ALK PHOS 103 100         Lab 09/04/21  1008 09/03/21  0305 09/02/21  0603 09/01/21  1235   PROBNP 839.4 1,385.0* 2,578.0* 2,959.0*   TROPONIN T  --   --  <0.010 <0.010             Lab 09/04/21  1008   FERRITIN 916.80*         Lab 09/01/21  1255   PH, ARTERIAL 7.432   PCO2, ARTERIAL 29.9*   PO2 ART 54.7*   FIO2 32   HCO3 ART 20.0   BASE EXCESS ART -3.2*   CARBOXYHEMOGLOBIN 0.7     Brief Urine Lab Results     None          Microbiology Results Abnormal     None          Adult Transthoracic Echo Complete W/ Cont if Necessary Per Protocol    Result Date: 9/4/2021  · The study is technically suboptimal for diagnosis. The quality of the study is limited due to patient body habitus, patient positioning and lung disease. · Left ventricular ejection fraction appears to be 46 - 50%. Left ventricular systolic function is low normal. · Left ventricular wall thickness is consistent with mild concentric hypertrophy. · The following left ventricular wall segments are  dyskinetic: apex.      XR Chest 1 View    Result Date: 9/3/2021  EXAMINATION: XR CHEST 1 VW-  INDICATION: CHF; U07.1-COVID-19; K24-Guxsjundoa's pneumoconiosis; J44.1-Chronic obstructive pulmonary disease with (acute) exacerbation; J96.11-Chronic respiratory failure with hypoxia; Z99.81-Dependence on supplemental oxygen; E87.4-Inae-tdhetbgrgv and hyponatremia; Z72.0-Tobacco use.  COMPARISON: 09/02/2021.  FINDINGS: Left-sided triple lead ICD is again noted. The heart is mildly enlarged. Vasculature is normal. Bibasilar interstitial disease is stable or little greater left than right. Upper lungs remain clear. No pneumothorax or effusion is seen.      Impression: Stable bibasilar interstitial disease. No new chest pathology is seen.   D:  09/03/2021 E:  09/03/2021  This report was finalized on 9/3/2021 9:57 PM by Dr. Vaughn Stiles MD.        Results for orders placed during the hospital encounter of 09/01/21    Adult Transthoracic Echo Complete W/ Cont if Necessary Per Protocol    Interpretation Summary  · The study is technically suboptimal for diagnosis. The quality of the study is limited due to patient body habitus, patient positioning and lung disease.  · Left ventricular ejection fraction appears to be 46 - 50%. Left ventricular systolic function is low normal.  · Left ventricular wall thickness is consistent with mild concentric hypertrophy.  · The following left ventricular wall segments are dyskinetic: apex.      I have reviewed the medications:  Scheduled Meds:apixaban, 5 mg, Oral, BID  atorvastatin, 10 mg, Oral, Nightly  dexamethasone, 6 mg, Oral, Daily With Breakfast  famotidine, 20 mg, Oral, Daily  isosorbide dinitrate, 20 mg, Oral, BID - Nitrates  metoprolol succinate XL, 25 mg, Oral, Daily  remdesivir, 100 mg, Intravenous, Daily With Lunch  tamsulosin, 0.4 mg, Oral, Nightly      Continuous Infusions:   PRN Meds:.•  acetaminophen  •  albuterol sulfate HFA  •  magnesium sulfate  •  melatonin  •   nitroglycerin  •  ondansetron  •  sodium chloride    Assessment/Plan   Assessment & Plan     Active Hospital Problems    Diagnosis  POA   • C-19 Pneumonitis  CHF [U07.1]  Yes   • Heart failure with reduced ejection fraction (CMS/HCC) [I50.20]  Unknown      Resolved Hospital Problems   No resolved problems to display.        Brief Hospital Course to date:  Morgan Luna is a 71 y.o. male w/ hx 3L o2 dep copd/cwp, cad/cabg, cardiomyopathy (s/p bivicd), parox aflutter (w/ minimal arrhthmia burden), chronic anticoagulation (eliquis), ckd 3 who is vaccinated for covid 19 (2nd shot 3/2021) who presented 9/1/21 w/ increasing dyspnea, diarrhea x 2 weeks and 89% sats on chronic oxygen. Was admitted to icu for covid pna, chf exacerbation as covid pcr was +. Was initiated on remdesivir, steroids, and was diuresed. Cardiology consulted. Echo revealed LV ef 46-50% and has been diuresed. Patient improved clinically and was transferred to telemetry on 9/4/21    *Covid 19 pneumonia w/ acute hypoxic respiratory failure  -fully vaccinated, 2nd shot 3/2021  -day #4/5 remdesivir  -day #4/10 steroid/decadron  -currently on 4Lnc, requires 3Lnc baseline  *COPD/CWP, 3Lnc O2 dependence  *Acute CHF w/ preserved EF  *Hx parox aflutter (w/ minimal burden of arrhythmia)  -echo 9/3/21 w/ mild dyskinesis of apex w/ abnormal septal wall motion due to pacing  -responding to iv diuresis; repeating bumex 2mg iv if renal fxn stable  -continue eliquis, idsordil, b-blocker, statin, asa 81  *hx CAD/previous CABG  *Hx of cardiomyopathy/biventricular icd implant  *CKD 3(baseline cr ~1.2-1.3)  *Hyponatremia, improved  *HTN  *HL  *Obesity  *GERD    Labs: covid progression    DVT prophylaxis:  Medical DVT prophylaxis orders are present. eliquis           Disposition: I expect the patient to be discharged TBD    CODE STATUS:   Code Status and Medical Interventions:   Ordered at: 09/01/21 1941     Code Status:    CPR     Medical Interventions (Level of Support  Prior to Arrest):    Full       Kamari Truong MD  09/04/21

## 2021-09-04 NOTE — PROGRESS NOTES
[facility]    Inpatient Progress Note      Chief Complaint/Reason for service:    · HFpEF         Subjective:       Breathing better each day.  Still requiring more oxygen than he normally does (on 3 L at home for COPD).  Denies chest pain, palpitations    Past medical, surgical, social and family history reviewed in the patient's electronic medical record.    Review of Systems:   Positive for dyspnea  Negative for exertional chest pain, dyspnea with exertion, lower extremity edema, palpitations    Problem List  Active Hospital Problems    Diagnosis  POA   • C-19 Pneumonitis  CHF [U07.1]  Yes   • Heart failure with reduced ejection fraction (CMS/HCC) [I50.20]  Unknown      Resolved Hospital Problems   No resolved problems to display.            Objective:      Current Facility-Administered Medications:   •  acetaminophen (TYLENOL) tablet 650 mg, 650 mg, Oral, Q4H PRN, Baldo Cohn MD  •  albuterol sulfate HFA (PROVENTIL HFA;VENTOLIN HFA;PROAIR HFA) inhaler 2 puff, 2 puff, Inhalation, Q4H PRN, Baldo Cohn MD  •  apixaban (ELIQUIS) tablet 5 mg, 5 mg, Oral, BID, Baldo Cohn MD, 5 mg at 09/03/21 2023  •  atorvastatin (LIPITOR) tablet 10 mg, 10 mg, Oral, Nightly, Baldo Cohn MD, 10 mg at 09/03/21 2023  •  dexamethasone (DECADRON) tablet 6 mg, 6 mg, Oral, Daily With Breakfast, Baldo Cohn MD  •  famotidine (PEPCID) tablet 20 mg, 20 mg, Oral, Daily, Baldo Cohn MD, 20 mg at 09/03/21 0914  •  isosorbide dinitrate (ISORDIL) tablet 20 mg, 20 mg, Oral, BID - Nitrates, Baldo Cohn MD, 20 mg at 09/03/21 1807  •  magnesium sulfate 4g/100mL (PREMIX) infusion, 4 g, Intravenous, PRN, Baldo Cohn MD, 4 g at 09/02/21 0830  •  melatonin tablet 10 mg, 10 mg, Oral, Nightly PRN, Baldo Cohn MD, 10 mg at 09/03/21 2023  •  metoprolol succinate XL (TOPROL-XL) 24 hr tablet 25 mg, 25 mg, Oral, Daily, Baldo Cohn MD, 25 mg at 09/03/21 0923  •  nitroglycerin (NITROSTAT) SL  tablet 0.4 mg, 0.4 mg, Sublingual, Q5 Min PRN, Baldo Cohn MD  •  ondansetron (ZOFRAN) injection 4 mg, 4 mg, Intravenous, Q6H PRN, Baldo Cohn MD  •  [COMPLETED] remdesivir 200 mg in sodium chloride 0.9 % 290 mL IVPB (powder vial), 200 mg, Intravenous, Q24H, 200 mg at 09/01/21 2026 **FOLLOWED BY** remdesivir 100 mg in sodium chloride 0.9 % 270 mL IVPB (powder vial), 100 mg, Intravenous, Daily With Lunch, Baldo Cohn MD, 100 mg at 09/03/21 1317  •  sodium chloride 0.9 % flush 10 mL, 10 mL, Intravenous, PRN, Baldo Cohn MD  •  tamsulosin (FLOMAX) 24 hr capsule 0.4 mg, 0.4 mg, Oral, Nightly, Baldo Cohn MD, 0.4 mg at 09/03/21 2023    Vital Sign Min/Max for last 24 hours  Temp  Min: 97.4 °F (36.3 °C)  Max: 97.7 °F (36.5 °C)   BP  Min: 105/65  Max: 135/76   Pulse  Min: 80  Max: 101   Resp  Min: 20  Max: 22   SpO2  Min: 85 %  Max: 96 %   No data recorded      Intake/Output Summary (Last 24 hours) at 9/4/2021 0701  Last data filed at 9/3/2021 1808  Gross per 24 hour   Intake 640 ml   Output 1750 ml   Net -1110 ml           CONSTITUTIONAL: No acute distress  RESPIRATORY: Normal effort.  Mild rhonchi bilaterally  CARDIOVASCULAR: Regular rate and rhythm with normal S1 and S2. Without murmur, gallop or rub. Normal radial pulse. There is no significant lower extremity edema bilaterally.  PSYCH: Normal affect and mood    Results reviewed by myself today including studies listed below:   Lab Results   Component Value Date    TROPONINT <0.010 09/02/2021       BUN   Date Value Ref Range Status   09/03/2021 23 8 - 23 mg/dL Final     Creatinine   Date Value Ref Range Status   09/03/2021 1.34 (H) 0.76 - 1.27 mg/dL Final     Potassium   Date Value Ref Range Status   09/03/2021 3.9 3.5 - 5.2 mmol/L Final     ALT (SGPT)   Date Value Ref Range Status   09/02/2021 37 1 - 41 U/L Final     AST (SGOT)   Date Value Ref Range Status   09/02/2021 68 (H) 1 - 40 U/L Final           Tele: Paced rhythm with ectopy            Assessment/Plan:         ASSESSMENT:  1. Acute heart failure with preserved EF in the setting of Covid pneumonia and a low normal EF of 45%  a. Echo 9/3 with mild dyskinesis of the apex and abnormal septal wall motion due to pacing  2. History of CAD status post CABG  3. History of biventricular ICD implant  4. Covid pneumonitis  a. Fully vaccinated with last dose in 3/2021  5. COPD on 3 L NC O2 at home  6. Hyponatremia  7. Hypertension  8. Mixed hyperlipidemia  9. GERD  10. Obesity    PLAN:  1. Morning labs pending  2. Repeat Bumex 2 mg IV push if creatinine stable  3. Continue Eliquis, statin, Isordil, beta-blocker  4. Starting an aspirin at 81 mg daily. On it at home.    Gumaro Huerta MD, MSc, FACC, [facility]  Interventional Cardiology

## 2021-09-05 LAB
ALBUMIN SERPL-MCNC: 3.3 G/DL (ref 3.5–5.2)
ALBUMIN/GLOB SERPL: 1 G/DL
ALP SERPL-CCNC: 100 U/L (ref 39–117)
ALT SERPL W P-5'-P-CCNC: 30 U/L (ref 1–41)
ANION GAP SERPL CALCULATED.3IONS-SCNC: 14 MMOL/L (ref 5–15)
AST SERPL-CCNC: 37 U/L (ref 1–40)
BILIRUB SERPL-MCNC: 1.1 MG/DL (ref 0–1.2)
BUN SERPL-MCNC: 28 MG/DL (ref 8–23)
BUN/CREAT SERPL: 22.4 (ref 7–25)
CALCIUM SPEC-SCNC: 8.9 MG/DL (ref 8.6–10.5)
CHLORIDE SERPL-SCNC: 98 MMOL/L (ref 98–107)
CO2 SERPL-SCNC: 21 MMOL/L (ref 22–29)
CREAT SERPL-MCNC: 1.25 MG/DL (ref 0.76–1.27)
CRP SERPL-MCNC: 1.72 MG/DL (ref 0–0.5)
DEPRECATED RDW RBC AUTO: 47.1 FL (ref 37–54)
ERYTHROCYTE [DISTWIDTH] IN BLOOD BY AUTOMATED COUNT: 13.9 % (ref 12.3–15.4)
FERRITIN SERPL-MCNC: 784.9 NG/ML (ref 30–400)
GFR SERPL CREATININE-BSD FRML MDRD: 57 ML/MIN/1.73
GLOBULIN UR ELPH-MCNC: 3.4 GM/DL
GLUCOSE SERPL-MCNC: 101 MG/DL (ref 65–99)
HCT VFR BLD AUTO: 43.6 % (ref 37.5–51)
HGB BLD-MCNC: 14.2 G/DL (ref 13–17.7)
MAGNESIUM SERPL-MCNC: 2.2 MG/DL (ref 1.6–2.4)
MCH RBC QN AUTO: 29.8 PG (ref 26.6–33)
MCHC RBC AUTO-ENTMCNC: 32.6 G/DL (ref 31.5–35.7)
MCV RBC AUTO: 91.6 FL (ref 79–97)
NT-PROBNP SERPL-MCNC: 697.8 PG/ML (ref 0–900)
PLATELET # BLD AUTO: 286 10*3/MM3 (ref 140–450)
PMV BLD AUTO: 9.6 FL (ref 6–12)
POTASSIUM SERPL-SCNC: 3.7 MMOL/L (ref 3.5–5.2)
PROT SERPL-MCNC: 6.7 G/DL (ref 6–8.5)
RBC # BLD AUTO: 4.76 10*6/MM3 (ref 4.14–5.8)
SODIUM SERPL-SCNC: 133 MMOL/L (ref 136–145)
WBC # BLD AUTO: 11.22 10*3/MM3 (ref 3.4–10.8)

## 2021-09-05 PROCEDURE — 94799 UNLISTED PULMONARY SVC/PX: CPT

## 2021-09-05 PROCEDURE — 99232 SBSQ HOSP IP/OBS MODERATE 35: CPT | Performed by: INTERNAL MEDICINE

## 2021-09-05 PROCEDURE — 82728 ASSAY OF FERRITIN: CPT | Performed by: INTERNAL MEDICINE

## 2021-09-05 PROCEDURE — 63710000001 DEXAMETHASONE PER 0.25 MG: Performed by: INTERNAL MEDICINE

## 2021-09-05 PROCEDURE — 85027 COMPLETE CBC AUTOMATED: CPT | Performed by: INTERNAL MEDICINE

## 2021-09-05 PROCEDURE — 80053 COMPREHEN METABOLIC PANEL: CPT | Performed by: INTERNAL MEDICINE

## 2021-09-05 PROCEDURE — 99233 SBSQ HOSP IP/OBS HIGH 50: CPT | Performed by: INTERNAL MEDICINE

## 2021-09-05 PROCEDURE — 83880 ASSAY OF NATRIURETIC PEPTIDE: CPT | Performed by: INTERNAL MEDICINE

## 2021-09-05 PROCEDURE — 83735 ASSAY OF MAGNESIUM: CPT | Performed by: INTERNAL MEDICINE

## 2021-09-05 PROCEDURE — 86140 C-REACTIVE PROTEIN: CPT | Performed by: INTERNAL MEDICINE

## 2021-09-05 RX ORDER — PANTOPRAZOLE SODIUM 40 MG/1
40 TABLET, DELAYED RELEASE ORAL
Status: DISCONTINUED | OUTPATIENT
Start: 2021-09-05 | End: 2021-09-06 | Stop reason: HOSPADM

## 2021-09-05 RX ORDER — FUROSEMIDE 40 MG/1
40 TABLET ORAL ONCE
Status: DISCONTINUED | OUTPATIENT
Start: 2021-09-05 | End: 2021-09-05

## 2021-09-05 RX ORDER — FUROSEMIDE 20 MG/1
20 TABLET ORAL DAILY
Status: DISCONTINUED | OUTPATIENT
Start: 2021-09-06 | End: 2021-09-06 | Stop reason: HOSPADM

## 2021-09-05 RX ADMIN — METOPROLOL SUCCINATE 25 MG: 25 TABLET, EXTENDED RELEASE ORAL at 09:34

## 2021-09-05 RX ADMIN — PANTOPRAZOLE SODIUM 40 MG: 40 TABLET, DELAYED RELEASE ORAL at 13:57

## 2021-09-05 RX ADMIN — Medication 10 MG: at 20:01

## 2021-09-05 RX ADMIN — BUDESONIDE AND FORMOTEROL FUMARATE DIHYDRATE 2 PUFF: 80; 4.5 AEROSOL RESPIRATORY (INHALATION) at 18:46

## 2021-09-05 RX ADMIN — TAMSULOSIN HYDROCHLORIDE 0.4 MG: 0.4 CAPSULE ORAL at 20:01

## 2021-09-05 RX ADMIN — FAMOTIDINE 20 MG: 20 TABLET, FILM COATED ORAL at 09:34

## 2021-09-05 RX ADMIN — DEXAMETHASONE 6 MG: 2 TABLET ORAL at 09:34

## 2021-09-05 RX ADMIN — BUDESONIDE AND FORMOTEROL FUMARATE DIHYDRATE 2 PUFF: 80; 4.5 AEROSOL RESPIRATORY (INHALATION) at 07:13

## 2021-09-05 RX ADMIN — APIXABAN 5 MG: 5 TABLET, FILM COATED ORAL at 20:01

## 2021-09-05 RX ADMIN — ACETAMINOPHEN 650 MG: 325 TABLET, FILM COATED ORAL at 09:34

## 2021-09-05 RX ADMIN — ACETAMINOPHEN 650 MG: 325 TABLET, FILM COATED ORAL at 18:08

## 2021-09-05 RX ADMIN — ISOSORBIDE DINITRATE 20 MG: 20 TABLET ORAL at 18:08

## 2021-09-05 RX ADMIN — ATORVASTATIN CALCIUM 10 MG: 10 TABLET, FILM COATED ORAL at 20:01

## 2021-09-05 RX ADMIN — FUROSEMIDE 60 MG: 40 TABLET ORAL at 13:57

## 2021-09-05 RX ADMIN — ISOSORBIDE DINITRATE 20 MG: 20 TABLET ORAL at 09:34

## 2021-09-05 RX ADMIN — APIXABAN 5 MG: 5 TABLET, FILM COATED ORAL at 09:34

## 2021-09-05 NOTE — PROGRESS NOTES
"      Cardiac Electrophysiology Inpatient Follow Up Note         [facility] at [facility]    Progress Note    INITIAL REASON FOR CONSULT: Acute on chronic diastolic heart failure, coronary artery disease, biventricular ICD, hypertension    CHIEF COMPLAINT:  Chief Complaint   Patient presents with   • Shortness of Breath     Acute on chronic diastolic heart failure, coronary artery disease, biventricular ICD, hypertension    Subjective   Mr. Morgan Luna denies vomiting, abdominal pain, fussiness, diarrhea, cough and difficulty breathing.      REVIEW OF SYSTEMS  ROS no change from admission H&P except for: above    Objective   VITAL SIGNS  /65 (BP Location: Right arm, Patient Position: Lying)   Pulse 87   Temp 98.3 °F (36.8 °C) (Axillary)   Resp 20   Ht 182.9 cm (72\")   Wt 104 kg (228 lb 9.6 oz)   SpO2 94%   BMI 31.00 kg/m²   [unfilled]  Pulse Ox: SpO2  Av.8 %  Min: 92 %  Max: 96 %  Supplemental O2:       Admit Weight  Weight: 111 kg (245 lb)  Last 3 Weights  [unfilled]  Body mass index is 31 kg/m².    INTAKE/OUTPUT  I/O last 3 completed shifts:  In: 630 [P.O.:630]  Out: 400 [Urine:400]    Intake/Output Summary (Last 24 hours) at 2021 1529  Last data filed at 2021 1100  Gross per 24 hour   Intake 330 ml   Output 400 ml   Net -70 ml       PHYSICAL EXAM  General appearance: awake, alert, oriented, moves all extremities  Lungs: no rhonchi, no wheezes, no rales  Heart: Regular rate and rhythm  Abdomen: positive bowel sounds, no bruits, no masses  Extremities: warm and dry, no cyanosis, no clubbing    LABS  Results from last 7 days   Lab Units 21  0903 21  1008 21  0305   WBC 10*3/mm3 11.22* 11.31* 11.82*   HEMOGLOBIN g/dL 14.2 15.0 14.3   HEMATOCRIT % 43.6 44.4 41.9   MCV fL 91.6 90.4 87.5   PLATELETS 10*3/mm3 286 277 238         Results from last 7 days   Lab Units 21  0903 21  1008 21  1008 21  1445 21  0305 21  0305 "   POTASSIUM mmol/L 3.7  --  3.4* 3.9   < > 3.4*   CHLORIDE mmol/L 98  --  96*  --   --  92*   CO2 mmol/L 21.0*  --  24.0  --   --  24.0   BUN mg/dL 28*  --  28*  --   --  23   CREATININE mg/dL 1.25  --  1.37*  --   --  1.34*   GLUCOSE mg/dL 101*   < > 93  --    < > 98   CALCIUM mg/dL 8.9  --  8.9  --   --  8.6   MAGNESIUM mg/dL 2.2  --  2.3  --   --  2.5*    < > = values in this interval not displayed.             Results from last 7 days   Lab Units 09/05/21  0903 09/04/21  1008 09/03/21  0305   MAGNESIUM mg/dL 2.2 2.3 2.5*                 No results found for: CHOL, TRIG, HDL    CURRENT MEDICATIONS  apixaban, 5 mg, Oral, BID  atorvastatin, 10 mg, Oral, Nightly  budesonide-formoterol, 2 puff, Inhalation, BID - RT  dexamethasone, 6 mg, Oral, Daily With Breakfast  [START ON 9/6/2021] furosemide, 20 mg, Oral, Daily  isosorbide dinitrate, 20 mg, Oral, BID - Nitrates  metoprolol succinate XL, 25 mg, Oral, Daily  pantoprazole, 40 mg, Oral, Q AM  tamsulosin, 0.4 mg, Oral, Nightly      CONTINUOUS INFUSIONS           EKG: Noted  ECHO:REVIEWED   STRESS: REVIEWED  CATH: REVIEWED  CXR: Noted    TELEMETRY: Noted         Diagnosis Plan   1. COVID-19   per primary team.  Vaccinated status.   2. Black lung disease (CMS/[facility])   undoubtedly placing him at high risk for complications from COVID-19.   3. COPD with acute exacerbation (CMS/[facility])   as above.   4.   Heart failure with preserved ejection fraction.  Acute exacerbation.   Doing reasonably well.  Relatively euvolemic.   5.   Coronary artery disease.   Asymptomatic.  Apixaban and statin.   6.   Rare episodes of atrial fibrillation.  Not clinically an issue at this point.   Apixaban for the primary prevention of stroke.   Resynchronization ICD system in situ.  Normal device function.    Disposition per primary team.    Not much to add at this point.  Body mass index is 31 kg/m².    I spent 28 minutes in consultation with this patient which included more than 65% of this time in  direct face-to-face counseling, physical examination and discussion of my assessment and findings and shared decision making with the patient.  The remainder of the time not spent face to face was performing one, some or all of the following actions:  preparing to see this patient ( eg. Review of tests),  ordering medications, tests or procedures ), care coordination, discussion of the plan with other healthcare providers, documenting clinical information in Epic well as independently interpreting results and communicating results to patient, family and or caregiver.  All time noted occurred on the date of service.        Jay Martin DO, FACC, RS  Cardiac Electrophysiologist  [facility] / [facility]

## 2021-09-05 NOTE — PLAN OF CARE
Problem: Adult Inpatient Plan of Care  Goal: Plan of Care Review  Outcome: Ongoing, Progressing    VSS. Patient alert and oriented self. Disoriented to time and place. Oxygen remains intact at 3L/minute via nasal cannula (baseline). Paced via telemetry. Patient removed IV this shift. Multiple attempts made to replace, without success. Charge RN aware. Otherwise, up to date on POC. No acute events overnight. Belongings and call  light at the bedside and within reach. Bed alarm active. Continuing to monitor.     Antonio Marks RN  06:50 EDT  09/05/21

## 2021-09-05 NOTE — PROGRESS NOTES
"    [facility] Medicine Services  PROGRESS NOTE    Patient Name: Morgan Luna  : 1950  MRN: [id number]    Date of Admission: 2021  Primary Care Physician: Nini Jhaveri, Risa Staton MD    Subjective   Subjective     CC:  covid pna, chf exac    HPI:  Breathing at baseline today. Iv came out, nurse difficulty getting a new one. Patient feels \"great\". No pain. On his chronic 3Lnc. Tolerating po. No n/v. No chest pain.     ROS:  Gen- No fevers, chills  CV- No chest pain, palpitations  Resp- above  GI- No N/V/D, abd pain    Objective   Objective     Vital Signs:   Temp:  [97.7 °F (36.5 °C)-100.2 °F (37.9 °C)] 98.3 °F (36.8 °C)  Heart Rate:  [75-93] 88  Resp:  [17-20] 20  BP: (104-127)/(65-88) 115/65  Flow (L/min):  [3] 3     Physical Exam:  Seen in full ppe  Constitutional:Alert, oriented x 3, nontoxic appearing,pleasant, no distress, normal work of breathine  Psych:Normal/appropriate affect  HEENT:[place], oroph clear, nasal canula in place  Neck: neck supple, full range of motion  Neuro: Face symmetric, speech clear, equal , moves all extremities  Cardiac: Rrr; No pretibial pitting edema  Resp: Ctab, normal effort  GI: abd soft, nontender, obese  Skin: No extremity rash  Musculoskeletal/extremities: no cyanosis extremities; no significant ankle edema      Results Reviewed:  LAB RESULTS:      Lab 21  0903 21  1008 21  0305 21  0603 21  1737 21  1235   WBC 11.22* 11.31* 11.82* 7.43  --  6.53   HEMOGLOBIN 14.2 15.0 14.3 13.9  --  13.3   HEMATOCRIT 43.6 44.4 41.9 40.7  --  38.7   PLATELETS 286 277 238 206  --  181   NEUTROS ABS  --  8.55* 9.56* 5.45  --  4.67   IMMATURE GRANS (ABS)  --  0.05 0.07* 0.02  --  0.02   LYMPHS ABS  --  2.01 1.62 1.52  --  1.42   MONOS ABS  --  0.69 0.56 0.43  --  0.41   EOS ABS  --  0.00 0.00 0.00  --  0.00   MCV 91.6 90.4 87.5 89.5  --  88.8   CRP 1.72* 1.97* 1.61*  --  0.87* 0.76*   PROCALCITONIN  --   --   --  0.11  " --  0.10   LACTATE  --   --   --   --   --  1.4   LDH  --  425* 419*  --  404* 384*   D DIMER QUANT  --   --  0.73* 0.90*  --  0.87*         Lab 09/05/21  0903 09/04/21  1008 09/03/21  1445 09/03/21  0305 09/02/21  1725 09/02/21  1144 09/02/21  1144 09/02/21  0603 09/02/21 0603 09/01/21  1737 09/01/21  1235   SODIUM 133* 134*  --  129* 126*  --  125*   < > 122*   < > 116*   POTASSIUM 3.7 3.4* 3.9 3.4* 3.8   < > 3.9   < > 4.7   < > 3.8   CHLORIDE 98 96*  --  92* 89*  --  90*   < > 88*   < > 84*   CO2 21.0* 24.0  --  24.0 24.0  --  21.0*   < > 21.0*   < > 20.0*   ANION GAP 14.0 14.0  --  13.0 13.0  --  14.0   < > 13.0   < > 12.0   BUN 28* 28*  --  23 21  --  19   < > 16   < > 13   CREATININE 1.25 1.37*  --  1.34* 1.32*  --  1.35*   < > 1.34*   < > 1.19   GLUCOSE 101* 93  --  98 130*  --  106*   < > 100*   < > 100*   CALCIUM 8.9 8.9  --  8.6 8.6  --  8.7   < > 8.7   < > 8.3*   MAGNESIUM 2.2 2.3  --  2.5*  --   --   --   --  1.8  --  1.8   PHOSPHORUS  --  2.9  --   --   --   --   --   --  3.5  --   --    HEMOGLOBIN A1C  --   --   --   --   --   --   --   --   --   --  5.90*   TSH  --   --   --   --   --   --   --   --   --   --  1.260    < > = values in this interval not displayed.         Lab 09/05/21 0903 09/02/21 0603 09/01/21  1235   TOTAL PROTEIN 6.7 6.8 6.9   ALBUMIN 3.30* 3.50 3.60   GLOBULIN 3.4 3.3 3.3   ALT (SGPT) 30 37 36   AST (SGOT) 37 68* 63*   BILIRUBIN 1.1 1.1 1.1   ALK PHOS 100 103 100         Lab 09/05/21  0903 09/04/21  1008 09/03/21  0305 09/02/21  0603 09/01/21  1235   PROBNP 697.8 839.4 1,385.0* 2,578.0* 2,959.0*   TROPONIN T  --   --   --  <0.010 <0.010             Lab 09/05/21  0903   FERRITIN 784.90*         Lab 09/01/21  1255   PH, ARTERIAL 7.432   PCO2, ARTERIAL 29.9*   PO2 ART 54.7*   FIO2 32   HCO3 ART 20.0   BASE EXCESS ART -3.2*   CARBOXYHEMOGLOBIN 0.7     Brief Urine Lab Results     None          Microbiology Results Abnormal     None          No radiology results from the last 24  hrs    Results for orders placed during the hospital encounter of 09/01/21    Adult Transthoracic Echo Complete W/ Cont if Necessary Per Protocol    Interpretation Summary  · The study is technically suboptimal for diagnosis. The quality of the study is limited due to patient body habitus, patient positioning and lung disease.  · Left ventricular ejection fraction appears to be 46 - 50%. Left ventricular systolic function is low normal.  · Left ventricular wall thickness is consistent with mild concentric hypertrophy.  · The following left ventricular wall segments are dyskinetic: apex.      I have reviewed the medications:  Scheduled Meds:apixaban, 5 mg, Oral, BID  atorvastatin, 10 mg, Oral, Nightly  budesonide-formoterol, 2 puff, Inhalation, BID - RT  dexamethasone, 6 mg, Oral, Daily With Breakfast  [START ON 9/6/2021] furosemide, 20 mg, Oral, Daily  furosemide, 60 mg, Oral, Once  isosorbide dinitrate, 20 mg, Oral, BID - Nitrates  metoprolol succinate XL, 25 mg, Oral, Daily  pantoprazole, 40 mg, Oral, Q AM  tamsulosin, 0.4 mg, Oral, Nightly      Continuous Infusions:   PRN Meds:.•  acetaminophen  •  albuterol sulfate HFA  •  magnesium sulfate **OR** magnesium sulfate in D5W 1g/100mL (PREMIX) **OR** magnesium sulfate  •  melatonin  •  nitroglycerin  •  ondansetron  •  potassium chloride **OR** potassium chloride **OR** potassium chloride  •  sodium chloride    Assessment/Plan   Assessment & Plan     Active Hospital Problems    Diagnosis  POA   • C-19 Pneumonitis  CHF [U07.1]  Yes   • Heart failure with reduced ejection fraction (CMS/HCC) [I50.20]  Unknown      Resolved Hospital Problems   No resolved problems to display.        Brief Hospital Course to date:  Morgan Luna is a 71 y.o. male w/ hx 3L o2 dep copd/cwp, cad/cabg, cardiomyopathy (s/p bivicd), parox aflutter (w/ minimal arrhthmia burden), chronic anticoagulation (eliquis), ckd 3 who is vaccinated for covid 19 (2nd shot 3/2021) who presented 9/1/21 w/  "increasing dyspnea, diarrhea x 2 weeks and 89% sats on chronic oxygen. Was admitted to icu for covid pna, chf exacerbation as covid pcr was +. Was initiated on remdesivir, steroids, and was diuresed. Cardiology consulted. Echo revealed LV ef 46-50% and has been diuresed. Patient improved clinically and was transferred to telemetry on 9/4/21    *Covid 19 pneumonia w/ acute hypoxic respiratory failure  -fully vaccinated, 2nd shot 3/2021  -s/p 4 days remdesivir, was to get 5th dose today 9/5/21 but lost iv access and very \"difficult stick\"; will stop remdesivir  -day #5/10 steroid/decadron  -currently weaned to 3Lnc (patient's baseline)  -will give single dose lasix 60mg po x 1 now; then restart home lasix 20mg po daily tomorrow  -patient feels \"great\" today & wants to go home, but he states it would be extraorindarily difficulty to have someone get him today (would be very late tonight) and has asked to be discharged tomorrow morning  -I called & updated wife (Cadence) on 9/5/21, she appreciated the call  *COPD/CWP, 3Lnc O2 dependence  *Acute CHF w/ preserved EF  *Hx parox aflutter (w/ minimal burden of arrhythmia)  -echo 9/3/21 w/ mild dyskinesis of apex w/ abnormal septal wall motion due to pacing  -cards following but yet to leave note today 9/5/21: since no iv access (and difficulty getting one) will give lasix 60mg po x 1, then restart home lasix 20mg po daily starting tomorrow  -continue eliquis, idsordil, b-blocker, statin, asa 81  -follow up per cards recs  *hx CAD/previous CABG  *Hx of cardiomyopathy/biventricular icd implant  *CKD 3(baseline cr ~1.2-1.3), stable  *Hyponatremia, improved  *HTN  *HL  *Obesity  *GERD    Labs: bmp    DVT prophylaxis:  Medical DVT prophylaxis orders are present. eliquis         Disposition: I expect the patient to be discharged home tomorrow 9/6/21 morning    CODE STATUS:   Code Status and Medical Interventions:   Ordered at: 09/01/21 1941     Code Status:    CPR     Medical " Interventions (Level of Support Prior to Arrest):    Full       Kamari Truong MD  09/05/21

## 2021-09-06 ENCOUNTER — READMISSION MANAGEMENT (OUTPATIENT)
Dept: CALL CENTER | Facility: HOSPITAL | Age: 71
End: 2021-09-06

## 2021-09-06 VITALS
HEIGHT: 72 IN | HEART RATE: 101 BPM | OXYGEN SATURATION: 94 % | DIASTOLIC BLOOD PRESSURE: 66 MMHG | WEIGHT: 224.4 LBS | BODY MASS INDEX: 30.4 KG/M2 | RESPIRATION RATE: 20 BRPM | TEMPERATURE: 98.4 F | SYSTOLIC BLOOD PRESSURE: 93 MMHG

## 2021-09-06 PROBLEM — D89.831 CYTOKINE RELEASE SYNDROME, GRADE 1: Status: ACTIVE | Noted: 2021-09-06

## 2021-09-06 LAB
ANION GAP SERPL CALCULATED.3IONS-SCNC: 14 MMOL/L (ref 5–15)
BUN SERPL-MCNC: 25 MG/DL (ref 8–23)
BUN/CREAT SERPL: 17.9 (ref 7–25)
CALCIUM SPEC-SCNC: 9 MG/DL (ref 8.6–10.5)
CHLORIDE SERPL-SCNC: 99 MMOL/L (ref 98–107)
CO2 SERPL-SCNC: 22 MMOL/L (ref 22–29)
CREAT SERPL-MCNC: 1.4 MG/DL (ref 0.76–1.27)
GFR SERPL CREATININE-BSD FRML MDRD: 50 ML/MIN/1.73
GLUCOSE SERPL-MCNC: 100 MG/DL (ref 65–99)
POTASSIUM SERPL-SCNC: 4.1 MMOL/L (ref 3.5–5.2)
QT INTERVAL: 398 MS
QTC INTERVAL: 521 MS
SODIUM SERPL-SCNC: 135 MMOL/L (ref 136–145)

## 2021-09-06 PROCEDURE — 94799 UNLISTED PULMONARY SVC/PX: CPT

## 2021-09-06 PROCEDURE — 99231 SBSQ HOSP IP/OBS SF/LOW 25: CPT | Performed by: NURSE PRACTITIONER

## 2021-09-06 PROCEDURE — 80048 BASIC METABOLIC PNL TOTAL CA: CPT | Performed by: INTERNAL MEDICINE

## 2021-09-06 PROCEDURE — 99239 HOSP IP/OBS DSCHRG MGMT >30: CPT | Performed by: INTERNAL MEDICINE

## 2021-09-06 PROCEDURE — 63710000001 DEXAMETHASONE PER 0.25 MG: Performed by: INTERNAL MEDICINE

## 2021-09-06 RX ORDER — DEXAMETHASONE 6 MG/1
6 TABLET ORAL
Qty: 5 TABLET | Refills: 0 | Status: SHIPPED | OUTPATIENT
Start: 2021-09-06 | End: 2021-09-11

## 2021-09-06 RX ADMIN — ISOSORBIDE DINITRATE 20 MG: 20 TABLET ORAL at 08:43

## 2021-09-06 RX ADMIN — BUDESONIDE AND FORMOTEROL FUMARATE DIHYDRATE 2 PUFF: 80; 4.5 AEROSOL RESPIRATORY (INHALATION) at 09:19

## 2021-09-06 RX ADMIN — DEXAMETHASONE 6 MG: 2 TABLET ORAL at 08:43

## 2021-09-06 RX ADMIN — FUROSEMIDE 20 MG: 20 TABLET ORAL at 08:43

## 2021-09-06 RX ADMIN — METOPROLOL SUCCINATE 25 MG: 25 TABLET, EXTENDED RELEASE ORAL at 08:43

## 2021-09-06 RX ADMIN — APIXABAN 5 MG: 5 TABLET, FILM COATED ORAL at 08:44

## 2021-09-06 RX ADMIN — PANTOPRAZOLE SODIUM 40 MG: 40 TABLET, DELAYED RELEASE ORAL at 04:13

## 2021-09-06 NOTE — PLAN OF CARE
Problem: Adult Inpatient Plan of Care  Goal: Plan of Care Review  Outcome: Ongoing, Progressing    VSS.Patient remains alert and oriented to self and situation. Disoriented to time. Restless. Patient has continued to exit bed impulsively a/e/b  bed alarm sounding frequently. He continues to remove tele-monitor, oxygen, and 02 sat finger probe. Educated on the importance of utilizing call light prior to exiting bed & leaving equipment intact to his body, as it was placed. Patient needing gentle reminding. Otherwise, no acute changes overnight. Belongings and call  light at the bedside and within reach. Bed alarm active. Continuing to monitor.     Antonio Marks RN  03:45 EDT  09/06/21

## 2021-09-06 NOTE — CASE MANAGEMENT/SOCIAL WORK
Discharge Planning Assessment  [facility]     Patient Name: Morgan Luna  MRN: [id number]  Today's Date: 9/6/2021    Admit Date: 9/1/2021    Discharge Needs Assessment    No documentation.       Discharge Plan     Row Name 09/06/21 0930       Plan    Plan  Home    Patient/Family in Agreement with Plan  yes    Plan Comments  Mr Luna is being DC today. He is already on 3LNC home oxygen. A secure chat was sent to his primary nurse asking her to please provide him with a pulse ox prior to DC. I called & spoke wtih Mr Luna. He has a portable tank at bedside. No other needs voiced. Plan is home and spouse will transport.    Final Discharge Disposition Code  01 - home or self-care        Continued Care and Services - Admitted Since 9/1/2021    Coordination has not been started for this encounter.       Expected Discharge Date and Time     Expected Discharge Date Expected Discharge Time    Sep 6, 2021         Demographic Summary    No documentation.       Functional Status    No documentation.       Psychosocial    No documentation.       Abuse/Neglect    No documentation.       Legal    No documentation.       Substance Abuse    No documentation.       Patient Forms    No documentation.           Josie Ricardo, JUDI

## 2021-09-06 NOTE — PROGRESS NOTES
"      Cardiac Electrophysiology Inpatient Follow Up Note         [facility]    Progress Note    INITIAL REASON FOR CONSULT: Acute on chronic diastolic heart failure, coronary artery disease, biventricular ICD, hypertension    CHIEF COMPLAINT:  Chief Complaint   Patient presents with   • Shortness of Breath     Acute on chronic diastolic heart failure, coronary artery disease, biventricular ICD, hypertension    Subjective   Mr. Morgan Luna is sitting up in the chair without complaints.  He anticipates discharge home today.  He is breathing easy on O2 at 3 L via nasal cannula.  He reminds me that this is his baseline as he uses O2 at 3 L at home.  I did not enter the room but spoke with the patient on the phone and observed him through the window.    REVIEW OF SYSTEMS  ROS no change from admission H&P except for: above    Objective   VITAL SIGNS  /77 (BP Location: Right arm, Patient Position: Lying)   Pulse 91   Temp 97.9 °F (36.6 °C) (Oral)   Resp 18   Ht 182.9 cm (72\")   Wt 102 kg (224 lb 6.4 oz)   SpO2 93%   BMI 30.43 kg/m²   O2 at 3 L via nasal cannula  Pulse Ox: SpO2  Av.5 %  Min: 92 %  Max: 93 %  Supplemental O2:       Admit Weight  Weight: 111 kg (245 lb)  Last 3 Weights  Body mass index is 30.43 kg/m².    INTAKE/OUTPUT  I/O last 3 completed shifts:  In: 1050 [P.O.:1050]  Out: 900 [Urine:900]    Intake/Output Summary (Last 24 hours) at 2021 0749  Last data filed at 2021 0407  Gross per 24 hour   Intake 1020 ml   Output 900 ml   Net 120 ml       PHYSICAL EXAM  Exam was performed through visualizing the patient from the window and speaking with him on the phone.  General appearance: awake, alert, oriented, moves all extremities  No audible dyspnea  Paced on telemetry        LABS  Results from last 7 days   Lab Units 21  0903 21  1008 21  0305   WBC 10*3/mm3 11.22* 11.31* 11.82*   HEMOGLOBIN g/dL 14.2 15.0 14.3   HEMATOCRIT % 43.6 " 44.4 41.9   MCV fL 91.6 90.4 87.5   PLATELETS 10*3/mm3 286 277 238         Results from last 7 days   Lab Units 09/05/21  0903 09/04/21  1008 09/04/21  1008 09/03/21  1445 09/03/21  0305 09/03/21  0305   POTASSIUM mmol/L 3.7  --  3.4* 3.9   < > 3.4*   CHLORIDE mmol/L 98  --  96*  --   --  92*   CO2 mmol/L 21.0*  --  24.0  --   --  24.0   BUN mg/dL 28*  --  28*  --   --  23   CREATININE mg/dL 1.25  --  1.37*  --   --  1.34*   GLUCOSE mg/dL 101*   < > 93  --    < > 98   CALCIUM mg/dL 8.9  --  8.9  --   --  8.6   MAGNESIUM mg/dL 2.2  --  2.3  --   --  2.5*    < > = values in this interval not displayed.             Results from last 7 days   Lab Units 09/05/21  0903 09/04/21  1008 09/03/21  0305   MAGNESIUM mg/dL 2.2 2.3 2.5*                 No results found for: CHOL, TRIG, HDL    CURRENT MEDICATIONS  apixaban, 5 mg, Oral, BID  atorvastatin, 10 mg, Oral, Nightly  budesonide-formoterol, 2 puff, Inhalation, BID - RT  dexamethasone, 6 mg, Oral, Daily With Breakfast  furosemide, 20 mg, Oral, Daily  isosorbide dinitrate, 20 mg, Oral, BID - Nitrates  metoprolol succinate XL, 25 mg, Oral, Daily  pantoprazole, 40 mg, Oral, Q AM  tamsulosin, 0.4 mg, Oral, Nightly               EKG: Noted  ECHO:REVIEWED   STRESS: REVIEWED  CATH: REVIEWED  CXR: Noted    TELEMETRY: Noted         Diagnosis Plan   1. COVID-19   per primary team.  Vaccinated status.   2. Black lung disease (CMS/HCC)   undoubtedly placing him at high risk for complications from COVID-19.   3. COPD with acute exacerbation (CMS/HCC)   as above.   4.   Heart failure with preserved ejection fraction.  Acute exacerbation.   Doing reasonably well.  Relatively euvolemic.  Lasix 20 mg daily, metoprolol succinate 25 mg daily   5.   Coronary artery disease.   Asymptomatic.  Apixaban and statin.   6.   Rare episodes of atrial fibrillation.  Not clinically an issue at this point.   Apixaban for the primary prevention of stroke.   Resynchronization ICD system in situ.  Normal  device function.    Disposition per primary team.    Okay for discharge home  Follow-up with Dr. Felix at already scheduled appointment in November.  Body mass index is 30.43 kg/m².        OZZIE Oneill

## 2021-09-06 NOTE — OUTREACH NOTE
Prep Survey      Responses   [facility] facility patient discharged from?  [facility]   Is LACE score < 7 ?  No   Emergency Room discharge w/ pulse ox?  No   Eligibility  Readm Mgmt   Discharge diagnosis  Covid 19 pneumonia w/ acute hypoxic respiratory failure   Does the patient have one of the following disease processes/diagnoses(primary or secondary)?  COVID-19   Does the patient have Home health ordered?  No   Is there a DME ordered?  No   Prep survey completed?  Yes          Helen Ferrera RN

## 2021-09-06 NOTE — DISCHARGE SUMMARY
[facility] Medicine Services  DISCHARGE SUMMARY    Patient Name: Morgan Luna  : 1950  MRN: [id number]    Date of Admission: 2021 12:17 PM  Date of Discharge: 2021  Primary Care Physician: Nini Jhaveri, Risa Staton MD    Consults     Date and Time Order Name Status Description    2021 12:35 AM Inpatient Cardiology Consult Completed           Hospital Course     Presenting Problem:   COVID-19 [U07.1]    Active Hospital Problems    Diagnosis  POA   • Cytokine release syndrome, grade 1 [D89.831]  Unknown   • C-19 Pneumonitis  CHF [U07.1]  Yes   • Heart failure with reduced ejection fraction (CMS/HCC) [I50.20]  Yes   • COPD (chronic obstructive pulmonary disease) (CMS/HCC) [J44.9]  Yes   • Black lung disease (CMS/HCC) [J60]  Yes   • HTN (hypertension), benign [I10]  Yes      Resolved Hospital Problems   No resolved problems to display.        Hospital Course:  Morgan Luna is a 71 y.o. male w/ hx 3L o2 dep copd/cwp, cad/cabg, cardiomyopathy (s/p bivicd), parox aflutter (w/ minimal arrhthmia burden), chronic anticoagulation (eliquis), ckd 3 who is vaccinated for covid 19 (2nd shot 3/2021) who presented 21 w/ increasing dyspnea, diarrhea x 2 weeks and 89% sats on chronic oxygen. Was admitted to icu for covid pna, chf exacerbation as covid pcr was +. Was initiated on remdesivir, steroids, and was diuresed. Cardiology consulted. Echo revealed LV ef 46-50% and has been diuresed. Patient improved clinically and was transferred to telemetry on 21     Covid 19 pneumonia w/ acute hypoxic respiratory failure  COPD/CWP, 3Lnc O2 dependence  -Had increased oxygen requirements above baseline on presentation. fully vaccinated, 2nd shot 3/2021  -Patient will need to remain in quarantine until 2021, 20 days from his first positive Covid test on .  -CT chest showed patchy groundglass opacities and interseptal thickening typical for COVID-19.  -Completed 4 days IV  remdesivir and 6 days of Decadron.  Will discharge for an additional 4 days of Decadron.  -currently weaned to 3Lnc (patient's baseline)    Acute CHF w/ preserved EF  Hx parox aflutter (w/ minimal burden of arrhythmia)  -echo 9/3/21 w/ mild dyskinesis of apex w/ abnormal septal wall motion due to pacing  -Patient was diuresed with improvement  -Cardiology followed during admission.  Patient can follow-up with cardiology in 6 weeks  -continue eliquis, metoprolol XL, idsordil, b-blocker, statin, asa 81 on discharge    Discharge Follow Up Recommendations for outpatient labs/diagnostics:  Follow-up with PCP in 1 week  Follow-up with cardiology in 6 weeks      Day of Discharge     HPI:   Patient is doing well this morning.  Feels ready to go home.  Denies any shortness of breath.  No complaints.    Review of Systems  General: denies fevers or chills  CV: denies chest pain  Resp: denies shortness of breath  Abd: denies abd pain, nausea      Vital Signs:   Temp:  [97.9 °F (36.6 °C)-98.5 °F (36.9 °C)] 98.4 °F (36.9 °C)  Heart Rate:  [] 100  Resp:  [18-20] 20  BP: ()/(63-77) 93/66     Physical Exam:  With patient's consent, physical exam was conducted via visual telemedicine encounter due to patient's current isolation requirements in the interest of PPE conservation.    Constitutional: No acute distress, awake, alert, nontoxic, normal body habitus  Respiratory: Good effort, nonlabored respirations   Cardiovascular:  tele with NSR  Musculoskeletal: No edema, normal muscle tone and mass for age  Psychiatric: Appropriate affect, good insight and judgement, cooperative  Neurologic: Oriented x 3, movements symmetric BUE and BLE, speech clear and fluent  Skin: No visible rashes, no jaundice seen on exposed skin through window        Pertinent  and/or Most Recent Results     LAB RESULTS:      Lab 09/05/21  0903 09/04/21  1008 09/03/21  0305 09/02/21  0603 09/01/21  1737 09/01/21  1235   WBC 11.22* 11.31* 11.82* 7.43   --  6.53   HEMOGLOBIN 14.2 15.0 14.3 13.9  --  13.3   HEMATOCRIT 43.6 44.4 41.9 40.7  --  38.7   PLATELETS 286 277 238 206  --  181   NEUTROS ABS  --  8.55* 9.56* 5.45  --  4.67   IMMATURE GRANS (ABS)  --  0.05 0.07* 0.02  --  0.02   LYMPHS ABS  --  2.01 1.62 1.52  --  1.42   MONOS ABS  --  0.69 0.56 0.43  --  0.41   EOS ABS  --  0.00 0.00 0.00  --  0.00   MCV 91.6 90.4 87.5 89.5  --  88.8   CRP 1.72* 1.97* 1.61*  --  0.87* 0.76*   PROCALCITONIN  --   --   --  0.11  --  0.10   LACTATE  --   --   --   --   --  1.4   LDH  --  425* 419*  --  404* 384*   D DIMER QUANT  --   --  0.73* 0.90*  --  0.87*         Lab 09/06/21  0729 09/05/21  0903 09/04/21  1008 09/03/21  1445 09/03/21  0305 09/02/21  1725 09/02/21  1725 09/02/21  1144 09/02/21  0603 09/01/21  1737 09/01/21  1235   SODIUM 135* 133* 134*  --  129*  --  126*   < > 122*   < > 116*   POTASSIUM 4.1 3.7 3.4* 3.9 3.4*   < > 3.8   < > 4.7   < > 3.8   CHLORIDE 99 98 96*  --  92*  --  89*   < > 88*   < > 84*   CO2 22.0 21.0* 24.0  --  24.0  --  24.0   < > 21.0*   < > 20.0*   ANION GAP 14.0 14.0 14.0  --  13.0  --  13.0   < > 13.0   < > 12.0   BUN 25* 28* 28*  --  23  --  21   < > 16   < > 13   CREATININE 1.40* 1.25 1.37*  --  1.34*  --  1.32*   < > 1.34*   < > 1.19   GLUCOSE 100* 101* 93  --  98  --  130*   < > 100*   < > 100*   CALCIUM 9.0 8.9 8.9  --  8.6  --  8.6   < > 8.7   < > 8.3*   MAGNESIUM  --  2.2 2.3  --  2.5*  --   --   --  1.8  --  1.8   PHOSPHORUS  --   --  2.9  --   --   --   --   --  3.5  --   --    HEMOGLOBIN A1C  --   --   --   --   --   --   --   --   --   --  5.90*   TSH  --   --   --   --   --   --   --   --   --   --  1.260    < > = values in this interval not displayed.         Lab 09/05/21  0903 09/02/21  0603 09/01/21  1235   TOTAL PROTEIN 6.7 6.8 6.9   ALBUMIN 3.30* 3.50 3.60   GLOBULIN 3.4 3.3 3.3   ALT (SGPT) 30 37 36   AST (SGOT) 37 68* 63*   BILIRUBIN 1.1 1.1 1.1   ALK PHOS 100 103 100         Lab 09/05/21  0903 09/04/21  1008  09/03/21  0305 09/02/21  0603 09/01/21  1235   PROBNP 697.8 839.4 1,385.0* 2,578.0* 2,959.0*   TROPONIN T  --   --   --  <0.010 <0.010             Lab 09/05/21  0903   FERRITIN 784.90*         Lab 09/01/21  1255   PH, ARTERIAL 7.432   PCO2, ARTERIAL 29.9*   PO2 ART 54.7*   FIO2 32   HCO3 ART 20.0   BASE EXCESS ART -3.2*   CARBOXYHEMOGLOBIN 0.7     Brief Urine Lab Results     None        Microbiology Results (last 10 days)     Procedure Component Value - Date/Time    COVID PRE-OP / PRE-PROCEDURE SCREENING ORDER (NO ISOLATION) - Swab, Nasopharynx [[id number]]  (Abnormal) Collected: 09/01/21 1239    Lab Status: Final result Specimen: Swab from Nasopharynx Updated: 09/01/21 1318    Narrative:      The following orders were created for panel order COVID PRE-OP / PRE-PROCEDURE SCREENING ORDER (NO ISOLATION) - Swab, Nasopharynx.  Procedure                               Abnormality         Status                     ---------                               -----------         ------                     COVID-19 and FLU A/B PCR...[[id number]]  Abnormal            Final result                 Please view results for these tests on the individual orders.    COVID-19 and FLU A/B PCR - Swab, Nasopharynx [[id number]]  (Abnormal) Collected: 09/01/21 1239    Lab Status: Final result Specimen: Swab from Nasopharynx Updated: 09/01/21 1318     COVID19 Detected     Influenza A PCR Not Detected     Influenza B PCR Not Detected    Narrative:      Fact sheet for providers: https://www.fda.gov/media/505594/download    Fact sheet for patients: https://www.fda.gov/media/481087/download    Test performed by PCR.  Influenza A and Influenza B negative results should be considered presumptive in samples that have a positive SARS-CoV-2 result.    Competitive inhibition studies showed that SARS-CoV-2 virus, when present at concentrations above 3.6E+04 copies/mL, can inhibit the detection and amplification of influenza A and influenza B virus RNA if  present at or below 1.8E+02 copies/mL or 4.9E+02 copies/mL, respectively, and may lead to false negative influenza virus results. If co-infection with influenza A or influenza B virus is suspected in samples with a positive SARS-CoV-2 result, the sample should be re-tested with another FDA cleared, approved, or authorized influenza test, if influenza virus detection would change clinical management.          Adult Transthoracic Echo Complete W/ Cont if Necessary Per Protocol    Result Date: 9/4/2021  · The study is technically suboptimal for diagnosis. The quality of the study is limited due to patient body habitus, patient positioning and lung disease. · Left ventricular ejection fraction appears to be 46 - 50%. Left ventricular systolic function is low normal. · Left ventricular wall thickness is consistent with mild concentric hypertrophy. · The following left ventricular wall segments are dyskinetic: apex.      CT Chest Without Contrast Diagnostic    Result Date: 9/1/2021  EXAMINATION: CT CHEST WO CONTRAST DIAGNOSTIC-  INDICATION: COVID Evaluation, Cough, Fever; U07.1-COVID-19; Q38-Jqmiqyipib's pneumoconiosis; J44.1-Chronic obstructive pulmonary disease with (acute) exacerbation; J96.11-Chronic respiratory failure with hypoxia; Z99.81-Dependence on supplemental oxygen; E87.5-Pabh-vihdmfadef and hyponatremia; Z72.0-Tobacco use, shortness of breath, chest pain.  TECHNIQUE: Multiple axial CT imaging was obtained of the chest without the administration of intravenous contrast.  The radiation dose reduction device was turned on for each scan per the ALARA (As Low as Reasonably Achievable) protocol.  COMPARISON: NONE.  FINDINGS: The lung parenchyma reveals emphysematous and chronic changes seen bilaterally with patchy groundglass opacity seen in the periphery of the upper mid and lower lung field. Findings typical in appearance for Covid-19 pneumonia. No pleural effusion or pneumothorax. No pulmonary mass or nodule.  The thyroid is homogeneous. No mediastinal mass. Reactive adenopathy in the mediastinum and hilar regions. Coronary artery calcification identified. Cardiac chambers are within normal limits. No pericardial effusion. The visualized upper abdomen reveals stones in the gallbladder.      Patchy groundglass opacity seen with some interseptal thickening suggesting findings typical for Covid-19 pneumonia. No pleural effusion with reactive lymph nodes in the mediastinum and hilar regions.  D:  09/01/2021 E:  09/01/2021  This report was finalized on 9/1/2021 5:04 PM by Dr. Shawnee Brenner MD.      XR Chest 1 View    Result Date: 9/3/2021  EXAMINATION: XR CHEST 1 VW-  INDICATION: CHF; U07.1-COVID-19; L58-Dmyhyxptur's pneumoconiosis; J44.1-Chronic obstructive pulmonary disease with (acute) exacerbation; J96.11-Chronic respiratory failure with hypoxia; Z99.81-Dependence on supplemental oxygen; E87.3-Rxlm-txruwudumx and hyponatremia; Z72.0-Tobacco use.  COMPARISON: 09/02/2021.  FINDINGS: Left-sided triple lead ICD is again noted. The heart is mildly enlarged. Vasculature is normal. Bibasilar interstitial disease is stable or little greater left than right. Upper lungs remain clear. No pneumothorax or effusion is seen.      Stable bibasilar interstitial disease. No new chest pathology is seen.   D:  09/03/2021 E:  09/03/2021  This report was finalized on 9/3/2021 9:57 PM by Dr. Vaughn Stiles MD.      XR Chest 1 View    Result Date: 9/2/2021  EXAMINATION: XR CHEST 1 VW-09/02/2021:  INDICATION: CHF, Covid-19; U07.1-COVID-19; F28-Xxdyjmptwi's pneumoconiosis; J44.1-Chronic obstructive pulmonary disease with (acute) exacerbation; J96.11-Chronic respiratory failure with hypoxia; Z99.81-Dependence on supplemental oxygen; E87.3-Nbwa-ufpapqkmeg and hyponatremia; Z72.0-Tobacco use.  COMPARISON: 09/01/2021.  FINDINGS: Left sided triple-lead ICD is again noted. The heart is normal in size. The vasculature appears normal. Patchy bibasilar  interstitial disease appears stable. The upper lungs remain clear. No pneumothorax or effusion is seen.      Stable bibasilar pulmonary interstitial disease.  D:  09/02/2021 E:  09/02/2021     This report was finalized on 9/2/2021 9:29 PM by Dr. Vaughn Stiles MD.      XR Chest 1 View    Result Date: 9/1/2021  EXAMINATION: XR CHEST 1 VW- 09/01/2021  INDICATION: SOA triage protocol  COMPARISON: NONE  FINDINGS: Portable chest reveals pacer leads in satisfactory position. Patchy airspace disease seen in the periphery of the mid and lower lung fields. There is increased pulmonary vascularity bilaterally. Degenerative changes seen within the spine. Patient status post median sternotomy.        Heart is enlarged with increased pulmonary vascularity bilaterally and increased markings superimposed in the periphery in the mid and lower lung field. Superimposed infiltrate is of concern. Clinical correlation needed.  D:  09/01/2021 E:  09/01/2021  This report was finalized on 9/1/2021 5:04 PM by Dr. Shawnee Brenner MD.                Results for orders placed during the hospital encounter of 09/01/21    Adult Transthoracic Echo Complete W/ Cont if Necessary Per Protocol    Interpretation Summary  · The study is technically suboptimal for diagnosis. The quality of the study is limited due to patient body habitus, patient positioning and lung disease.  · Left ventricular ejection fraction appears to be 46 - 50%. Left ventricular systolic function is low normal.  · Left ventricular wall thickness is consistent with mild concentric hypertrophy.  · The following left ventricular wall segments are dyskinetic: apex.      Plan for Follow-up of Pending Labs/Results:     Discharge Details        Discharge Medications      New Medications      Instructions Start Date   dexamethasone 6 MG tablet  Commonly known as: DECADRON   6 mg, Oral, Daily With Breakfast         Changes to Medications      Instructions Start Date   furosemide 20 MG  tablet  Commonly known as: LASIX  What changed: when to take this   20 mg, Oral, As Needed         Continue These Medications      Instructions Start Date   albuterol sulfate  (90 Base) MCG/ACT inhaler  Commonly known as: PROVENTIL HFA;VENTOLIN HFA;PROAIR HFA   2 puffs, Inhalation, Every 4 Hours PRN      albuterol (2.5 MG/3ML) 0.083% nebulizer solution  Commonly known as: PROVENTIL   2.5 mg, Nebulization, Daily PRN      apixaban 5 MG tablet tablet  Commonly known as: ELIQUIS   5 mg, Oral, 2 Times Daily, (Stop aspirin when starting Eliquis)      atorvastatin 10 MG tablet  Commonly known as: LIPITOR   10 mg, Oral, Nightly      Breztri Aerosphere 160-9-4.8 MCG/ACT aerosol inhaler  Generic drug: Budeson-Glycopyrrol-Formoterol   2 puffs, Inhalation, 2 Times Daily      famotidine 20 MG tablet  Commonly known as: PEPCID   1 tablet, Oral, Daily      isosorbide dinitrate 20 MG tablet  Commonly known as: ISORDIL   TAKE 2 TABS DAILY      metoprolol succinate XL 25 MG 24 hr tablet  Commonly known as: TOPROL-XL   25 mg, Oral, Daily      nitroglycerin 0.4 MG SL tablet  Commonly known as: NITROSTAT   0.4 mg, Sublingual, Every 5 Minutes PRN, Take no more than 3 doses in 15 minutes.       polyethylene glycol 17 g packet  Commonly known as: MIRALAX   17 g, Oral, Daily PRN      tamsulosin 0.4 MG capsule 24 hr capsule  Commonly known as: FLOMAX   1 capsule, Oral, Nightly      traZODone 50 MG tablet  Commonly known as: DESYREL   50 mg, Oral, Nightly      Tylenol 8 Hour 650 MG 8 hr tablet  Generic drug: acetaminophen   1-2 tablets, Oral, As Needed             Allergies   Allergen Reactions   • Codeine      GI UPSET         Discharge Disposition:  Home or Self Care    Diet:  Hospital:  Diet Order   Procedures   • Diet Regular; Cardiac, Consistent Carbohydrate       Activity:  Activity Instructions     Activity as Tolerated            Restrictions or Other Recommendations:         CODE STATUS:    Code Status and Medical  Interventions:   Ordered at: 09/01/21 1941     Code Status:    CPR     Medical Interventions (Level of Support Prior to Arrest):    Full       Future Appointments   Date Time Provider Department Center   11/11/2021 10:45 AM Vinayak Felix III, MD [facility]       Additional Instructions for the Follow-ups that You Need to Schedule     Discharge Follow-up with PCP   As directed       Currently Documented PCP:    Risa Morales MD    PCP Phone Number:    [phone]     Follow Up Details: 1 week         Discharge Follow-up with PCP   As directed       Currently Documented PCP:    Risa Morales MD    PCP Phone Number:    [phone]     Follow Up Details: 1 week         Discharge Follow-up with Specified Provider: Cardiology; 6 Weeks   As directed      To: Cardiology    Follow Up: 6 Weeks                     Josie Donaldson MD  09/06/21      Time Spent on Discharge:  I spent  40  minutes on this discharge activity which included: face-to-face encounter with the patient, reviewing the data in the system, coordination of the care with the nursing staff as well as consultants, documentation, and entering orders.

## 2021-09-06 NOTE — PLAN OF CARE
Goal Outcome Evaluation:  Plan of Care Reviewed With: patient      Patient ready for discharge to home today

## 2021-09-07 ENCOUNTER — READMISSION MANAGEMENT (OUTPATIENT)
Dept: CALL CENTER | Facility: HOSPITAL | Age: 71
End: 2021-09-07

## 2021-09-07 NOTE — OUTREACH NOTE
COVID-19 Week 1 Survey      Responses   [facility] patient discharged from?  [place]   Does the patient have one of the following disease processes/diagnoses(primary or secondary)?  COVID-19   COVID-19 underlying condition?  None   Call Number  Call 1   Week 1 Call successful?  Yes   Call start time  1025   Call end time  1032   Discharge diagnosis  Covid 19 pneumonia w/ acute hypoxic respiratory failure   Meds reviewed with patient/caregiver?  Yes   Is the patient having any side effects they believe may be caused by any medication additions or changes?  No   Does the patient have all medications ordered at discharge?  Yes   Is the patient taking all medications as directed (includes completed medication regime)?  Yes   Comments regarding appointments  Has a heart and valve center appt on 9/10/2021 virtual visit   Does the patient have a primary care provider?   Yes   Comments regarding PCP  Encouraged patient to make a followup appt via telehaelth or when out of quarantine.    Does the patient have an appointment with their PCP or specialist within 7 days of discharge?  Yes [with cardiology]   Has the patient kept scheduled appointments due by today?  N/A   Has home health visited the patient within 72 hours of discharge?  N/A   Has all DME been delivered?  No   Psychosocial issues?  No   Did the patient receive a copy of their discharge instructions?  Yes   Did the patient receive a copy of COVID-19 specific instructions?  Yes   Nursing interventions  Reviewed instructions with patient   What is the patient's perception of their health status since discharge?  Improving   Does the patient have any of the following symptoms?  Shortness of breath, Cough   Nursing Interventions  Nurse provided patient education   Pulse Ox monitoring  Intermittent   O2 Sat comments  Sats are in 91 to 93 % with 02   O2 Sat: education provided  Sat levels, Monitoring frequency, When to seek care   O2 Sat education comments   Patient wears 3 LBNC 02 at home    Is the patient/caregiver able to teach back steps to recovery at home?  Set small, achievable goals for return to baseline health, Rest and rebuild strength, gradually increase activity, Make a list of questions for provider's appointment   If the patient is a current smoker, are they able to teach back resources for cessation?  Not a smoker   Is the patient/caregiver able to teach back the hierarchy of who to call/visit for symptoms/problems? PCP, Specialist, Home health nurse, Urgent Care, ED, 911  Yes   COVID-19 call completed?  Yes          Emanuel Jason RN

## 2021-09-08 ENCOUNTER — READMISSION MANAGEMENT (OUTPATIENT)
Dept: CALL CENTER | Facility: HOSPITAL | Age: 71
End: 2021-09-08

## 2021-09-08 NOTE — OUTREACH NOTE
COVID-19 Week 1 Survey      Responses   [facility] patient discharged from?  [place]   Does the patient have one of the following disease processes/diagnoses(primary or secondary)?  COVID-19   COVID-19 underlying condition?  None   Call Number  Call 2   Week 1 Call successful?  Yes   Call start time  1029   Call end time  1034   Discharge diagnosis  Covid 19 pneumonia w/ acute hypoxic respiratory failure   Is patient permission given to speak with other caregiver?  Yes   List who call center can speak with  Wife- Cadence   Meds reviewed with patient/caregiver?  Yes   Is the patient having any side effects they believe may be caused by any medication additions or changes?  No   Does the patient have all medications ordered at discharge?  Yes   Is the patient taking all medications as directed (includes completed medication regime)?  Yes   Comments regarding appointments  Has a heart and valve center appt on 9/10/2021 virtual visit   Does the patient have a primary care provider?   Yes   Comments regarding PCP  Apptment for Friday   Does the patient have an appointment with their PCP or specialist within 7 days of discharge?  Yes   Has the patient kept scheduled appointments due by today?  N/A   Has home health visited the patient within 72 hours of discharge?  N/A   Has all DME been delivered?  No   Psychosocial issues?  No   Did the patient receive a copy of their discharge instructions?  Yes   Did the patient receive a copy of COVID-19 specific instructions?  Yes   Nursing interventions  Reviewed instructions with patient   What is the patient's perception of their health status since discharge?  Improving   Does the patient have any of the following symptoms?  Shortness of breath   Nursing Interventions  Nurse provided patient education   Pulse Ox monitoring  Intermittent   Pulse Ox device source  Patient   O2 Sat comments  92-93% with O2 3L   O2 Sat: education provided  Sat levels, Monitoring frequency,  "When to seek care   O2 Sat education comments  If sat drops below 90% and stays call 911   Is the patient/caregiver able to teach back steps to recovery at home?  Rest and rebuild strength, gradually increase activity, Set small, achievable goals for return to baseline health, Make a list of questions for provider's appointment   If the patient is a current smoker, are they able to teach back resources for cessation?  Not a smoker   Is the patient/caregiver able to teach back the hierarchy of who to call/visit for symptoms/problems? PCP, Specialist, Home health nurse, Urgent Care, ED, 911  Yes   COVID-19 call completed?  Yes   Wrap up additional comments  States he is doing good. \"eating everything in sight.\"          Lily Krueger RN  "

## 2021-09-09 ENCOUNTER — READMISSION MANAGEMENT (OUTPATIENT)
Dept: CALL CENTER | Facility: HOSPITAL | Age: 71
End: 2021-09-09

## 2021-09-09 NOTE — OUTREACH NOTE
COVID-19 Week 1 Survey      Responses   [facility] patient discharged from?  [place]   Does the patient have one of the following disease processes/diagnoses(primary or secondary)?  COVID-19   COVID-19 underlying condition?  None   Call Number  Call 3   Week 1 Call successful?  Yes   Call start time  1142   Call end time  1146   Discharge diagnosis  Covid 19 pneumonia w/ acute hypoxic respiratory failure   Person spoke with today (if not patient) and relationship  Cadence   Meds reviewed with patient/caregiver?  Yes   Is the patient taking all medications as directed (includes completed medication regime)?  Yes   Comments regarding PCP  Virtual visit is next week   Has the patient kept scheduled appointments due by today?  N/A   DME comments  Pt wears 3 L of O2 continuous    What is the patient's perception of their health status since discharge?  Improving [Pt has constipation]   Does the patient have any of the following symptoms?  Shortness of breath, Loss of taste/smell   Pulse Ox monitoring  Intermittent   O2 Sat comments  90% with 3 L of O2    Is the patient/caregiver able to teach back steps to recovery at home?  Rest and rebuild strength, gradually increase activity, Set small, achievable goals for return to baseline health   COVID-19 call completed?  Yes          Cleopatra Jasmine RN

## 2021-09-09 NOTE — PROGRESS NOTES
"[facility], [facility]    This was an audio and video enabled telemedicine encounter.    Chief Complaint  Congestive Heart Failure and Establish Care    Subjective    History of Present Illness {CC  Problem List  Visit  Diagnosis   Encounters  Notes  Medications  Labs  Result Review Imaging  Media :23}     Morgan Luna presents to [facility] CARDIOLOGY for   History of Present Illness     71-year-old male admitted to [facility] on 9/1/2021 with COVID-19 pneumonitis and CHF exacerbation.  Patient with COPD, black lung (O2 dependent), CAD previous CABG, cardiomyopathy/heart failure (status post BiV ICD), paroxysmal atrial flutter on chronic anticoagulation, chronic kidney disease stage III, hypertension, anxiety    Echocardiogram 9/4/2021: EF 46 to 50%.  Patient symptoms improved with diuresis.  Patient was continued on metoprolol XL, Isordil, Eliquis for stroke prevention.      No CP, pressure.  Dsypnea has improved back to baseline.  No edema (resolved).  Still on Lasix normal dosing.  No dizziness, near syncope, syncope.  BP log baseline 100's  Objective     Vital Signs:   Vitals:    09/10/21 0856 09/10/21 0921   BP: (!) 82/46 100/68   BP Location: Left arm    Patient Position: Sitting    Cuff Size: Adult    Pulse: 81    SpO2: 97%    Weight: 113 kg (249 lb)    Height: 182.9 cm (72\")      Body mass index is 33.77 kg/m².  Physical Exam  Vitals reviewed.   Constitutional:       General: He is not in acute distress.  Pulmonary:      Effort: Pulmonary effort is normal.   Skin:     Coloration: Skin is not pale.   Neurological:      Mental Status: He is alert.   Psychiatric:         Mood and Affect: Mood normal.         Behavior: Behavior normal. Behavior is cooperative.              Result Review  Data Reviewed:{ Labs  Result Review  Imaging  Med Tab  Media :23}     Lab Results   Component Value Date    WBC 11.22 (H) 09/05/2021    HGB 14.2 " 09/05/2021    HCT 43.6 09/05/2021    MCV 91.6 09/05/2021     09/05/2021     Lab Results   Component Value Date    GLUCOSE 100 (H) 09/06/2021    CALCIUM 9.0 09/06/2021     (L) 09/06/2021    K 4.1 09/06/2021    CO2 22.0 09/06/2021    CL 99 09/06/2021    BUN 25 (H) 09/06/2021    CREATININE 1.40 (H) 09/06/2021    EGFRIFNONA 50 (L) 09/06/2021    BCR 17.9 09/06/2021    ANIONGAP 14.0 09/06/2021     Lab Results   Component Value Date    TSH 1.260 09/01/2021     Lab Results   Component Value Date    CHLPL 134 02/08/2018     Lab Results   Component Value Date    TRIG 133 02/08/2018     Lab Results   Component Value Date    HDL 41 02/08/2018     Lab Results   Component Value Date    LDL 66 02/08/2018       Assessment and Plan {CC Problem List  Visit Diagnosis  ROS  Review (Popup)  Health Maintenance  Quality  BestPractice  Medications  SmartSets  SnapShot Encounters  Media :23}   1. Systolic CHF, chronic (CMS/HCC)  EF 46%  Continue Lasix, metoprolol succinate  Max tolerated dose due to hypotension    Heart failure education discussed: What is heart failure, causes, signs and symptoms, medication management, daily weight monitoring, low-sodium diet of less than 2 g per day, daily exercise, role the heart failure center.    S/s in setting of COVID 19 PNA and chronic lung dx.      2. Ischemic cardiomyopathy  Asa, statin  Isordil  No CP    3. HTN (hypertension), benign  No normal, no s/s of hypotension  Continue to monitor    4. Stage 3 chronic kidney disease, unspecified whether stage 3a or 3b CKD (CMS/HCC)  Creatine 1.4      F/u 2 weeks.       I spent 22 minutes caring for Morgan on this date of service. This time includes time spent by me in the following activities:preparing for the visit, reviewing tests, performing a medically appropriate examination and/or evaluation , counseling and educating the patient/family/caregiver and documenting information in the medical record    Follow Up {Instructions  Charge Capture  Follow-up Communications :23}   Return in about 2 weeks (around 9/24/2021) for Video visit, HF.    Patient was given instructions and counseling regarding his condition or for health maintenance advice. Please see specific information pulled into the AVS if appropriate.  Patient was instructed to call the Heart and Valve Center with any questions, concerns, or worsening symptoms.    *Please note that portions of this note were completed with a voice recognition program. Efforts were made to edit the dictations, but occasionally words are mistranscribed.

## 2021-09-10 ENCOUNTER — TELEMEDICINE (OUTPATIENT)
Dept: CARDIOLOGY | Facility: HOSPITAL | Age: 71
End: 2021-09-10

## 2021-09-10 VITALS
SYSTOLIC BLOOD PRESSURE: 100 MMHG | HEIGHT: 72 IN | HEART RATE: 81 BPM | DIASTOLIC BLOOD PRESSURE: 68 MMHG | WEIGHT: 249 LBS | OXYGEN SATURATION: 97 % | BODY MASS INDEX: 33.72 KG/M2

## 2021-09-10 DIAGNOSIS — I10 HTN (HYPERTENSION), BENIGN: ICD-10-CM

## 2021-09-10 DIAGNOSIS — I25.5 ISCHEMIC CARDIOMYOPATHY: ICD-10-CM

## 2021-09-10 DIAGNOSIS — N18.30 STAGE 3 CHRONIC KIDNEY DISEASE, UNSPECIFIED WHETHER STAGE 3A OR 3B CKD (HCC): ICD-10-CM

## 2021-09-10 DIAGNOSIS — I50.22 SYSTOLIC CHF, CHRONIC (HCC): Primary | ICD-10-CM

## 2021-09-10 PROCEDURE — 99214 OFFICE O/P EST MOD 30 MIN: CPT | Performed by: NURSE PRACTITIONER

## 2021-09-13 ENCOUNTER — READMISSION MANAGEMENT (OUTPATIENT)
Dept: CALL CENTER | Facility: HOSPITAL | Age: 71
End: 2021-09-13

## 2021-09-13 NOTE — OUTREACH NOTE
"COVID-19 Week 2 Survey      Responses   [facility] patient discharged from?  [place]   Does the patient have one of the following disease processes/diagnoses(primary or secondary)?  COVID-19   COVID-19 underlying condition?  None   Call Number  Call 1   COVID-19 Week 2: Call 1 attempt successful?  Yes   Call start time  1520   Call end time  1524   Discharge diagnosis  Covid 19 pneumonia w/ acute hypoxic respiratory failure   Is the patient taking all medications as directed (includes completed medication regime)?  Yes   Medication comments  Completed steroids   Does the patient have a primary care provider?   Yes   Has the patient kept scheduled appointments due by today?  Yes   Comments  Had a televist with PCP   Psychosocial issues?  No   What is the patient's perception of their health status since discharge?  Improving   Does the patient have any of the following symptoms?  Shortness of breath, Loss of taste/smell [States shortness of breath was a prior issue due to black lung]   Pulse Ox monitoring  Intermittent   Pulse Ox device source  Patient   O2 Sat comments  92-93% on 3L of oxygen   O2 Sat: education provided  Sat levels, Monitoring frequency, When to seek care   O2 Sat education comments  If sat drops below 90% while at rest and using oxygen.   Is the patient/caregiver able to teach back steps to recovery at home?  Set small, achievable goals for return to baseline health, Rest and rebuild strength, gradually increase activity, Eat a well-balance diet   If the patient is a current smoker, are they able to teach back resources for cessation?  Not a smoker   Is the patient/caregiver able to teach back the hierarchy of who to call/visit for symptoms/problems? PCP, Specialist, Home health nurse, Urgent Care, ED, 911  Yes   COVID-19 call completed?  Yes   Wrap up additional comments  States \"I'm doing good\".  No issues with eating or drinking.  Using oxygen at all times with sats at 92-93% on 3L.    "       Nohelia Han LPN

## 2021-09-20 ENCOUNTER — READMISSION MANAGEMENT (OUTPATIENT)
Dept: CALL CENTER | Facility: HOSPITAL | Age: 71
End: 2021-09-20

## 2021-09-20 NOTE — OUTREACH NOTE
COVID-19 Week 3 Survey      Responses   [facility] patient discharged from?  [place]   Does the patient have one of the following disease processes/diagnoses(primary or secondary)?  COVID-19   COVID-19 underlying condition?  None   COVID-19 Week 3: Call 1 attempt successful?  Yes   Call start time  0851   Call end time  0853   Discharge diagnosis  Covid 19 pneumonia w/ acute hypoxic respiratory failure   Is patient permission given to speak with other caregiver?  Yes   List who call center can speak with  Mayo ware    Person spoke with today (if not patient) and relationship  Mayo son    Psychosocial issues?  No   What is the patient's perception of their health status since discharge?  Improving   Does the patient have any of the following symptoms?  None   Pulse Ox monitoring  Intermittent   Pulse Ox device source  Patient   O2 Sat comments  Oxygen has been good with oxygen    COVID-19 call completed?  Yes   Wrap up additional comments  Phone call muffled-not all questions answered          Augustina Landaverde RN

## 2021-09-27 ENCOUNTER — READMISSION MANAGEMENT (OUTPATIENT)
Dept: CALL CENTER | Facility: HOSPITAL | Age: 71
End: 2021-09-27

## 2021-09-27 NOTE — OUTREACH NOTE
COVID-19 Week 4 Survey      Responses   [facility] patient discharged from?  [place]   Does the patient have one of the following disease processes/diagnoses(primary or secondary)?  COVID-19   COVID-19 underlying condition?  None   Call Number  Call 1   COVID-19 Week 4: Call 1 attempt successful?  Yes   Call start time  0747   Call end time  0754   Discharge diagnosis  Covid 19 pneumonia w/ acute hypoxic respiratory failure   Meds reviewed with patient/caregiver?  Yes   Is the patient having any side effects they believe may be caused by any medication additions or changes?  No   Does the patient have all medications ordered at discharge?  Yes   Is the patient taking all medications as directed (includes completed medication regime)?  Yes   Medication comments  Routine medications   Has the patient kept scheduled appointments due by today?  Yes   DME comments  Pt wears 3 L of O2 continuous    What is the patient's perception of their health status since discharge?  Improving   Does the patient have any of the following symptoms?  Shortness of breath [Has black lung and wears O2 all the time. Says his brain seems foggy since having COVID]   Nursing Interventions  Nurse provided patient education   Pulse Ox monitoring  Intermittent   Pulse Ox device source  Patient   O2 Sat comments  O2 sat 92% with O2   O2 Sat: education provided  Sat levels, Monitoring frequency, When to seek care   Is the patient/caregiver able to teach back the hierarchy of who to call/visit for symptoms/problems? PCP, Specialist, Home health nurse, Urgent Care, ED, 911  Yes   Is the patient interested in additional calls from an ambulatory ?  NOTE:  applies to high risk patients requiring additional follow-up.  Yes   Did the patient feel the follow up calls were helpful during their recovery period?  Yes   Was the number of calls appropriate?  Yes          Rajni Renae RN

## 2021-09-29 ENCOUNTER — TELEPHONE (OUTPATIENT)
Dept: CARDIOLOGY | Facility: HOSPITAL | Age: 71
End: 2021-09-29

## 2021-09-29 ENCOUNTER — TELEMEDICINE (OUTPATIENT)
Dept: CARDIOLOGY | Facility: HOSPITAL | Age: 71
End: 2021-09-29

## 2021-09-29 VITALS
WEIGHT: 235 LBS | HEART RATE: 92 BPM | BODY MASS INDEX: 31.83 KG/M2 | SYSTOLIC BLOOD PRESSURE: 106 MMHG | OXYGEN SATURATION: 95 % | DIASTOLIC BLOOD PRESSURE: 54 MMHG | HEIGHT: 72 IN

## 2021-09-29 DIAGNOSIS — I10 HTN (HYPERTENSION), BENIGN: ICD-10-CM

## 2021-09-29 DIAGNOSIS — I25.5 ISCHEMIC CARDIOMYOPATHY: ICD-10-CM

## 2021-09-29 DIAGNOSIS — N18.30 STAGE 3 CHRONIC KIDNEY DISEASE, UNSPECIFIED WHETHER STAGE 3A OR 3B CKD (HCC): ICD-10-CM

## 2021-09-29 DIAGNOSIS — I50.22 SYSTOLIC CHF, CHRONIC (HCC): Primary | ICD-10-CM

## 2021-09-29 PROCEDURE — 99214 OFFICE O/P EST MOD 30 MIN: CPT | Performed by: NURSE PRACTITIONER

## 2021-09-29 NOTE — PROGRESS NOTES
"[facility], [facility]    This was an audio and video enabled telemedicine encounter.    Chief Complaint  Chest Pain and Follow-up    Subjective    History of Present Illness {CC  Problem List  Visit  Diagnosis   Encounters  Notes  Medications  Labs  Result Review Imaging  Media :23}     Morgan Luna presents to [facility] CARDIOLOGY for   History of Present Illness     71-year-old male who was hospitalized 9/1/2021 with COVID-19 pneumonia and CHF exacerbation.  History of COPD, black lung, O2 dependent, CAD with previous CABG, cardiomyopathy/heart failure (status post BiV ICD), paroxysmal atrial flutter on chronic anticoagulation, chronic kidney disease stage III, hypertension, anxiety.    Echocardiogram 9/4/2021: EF 46 to 50%.    Patient's baseline creatinine 1.2- 1.4.    Patient's blood pressure low normal last office visit.  Patient on Lasix, metoprolol succinate, Isordil.    No CP, pressure, palpitations, dizziness, syncope, edema. Home O2 at 3L.      Baseline dyspnea, moderate with minimal exertion.  Baseline for several years.  Slightly worse since COVID but not continuing to get worse.  Followed by pulmonary.            Objective     Vital Signs:   Vitals:    09/29/21 0926   BP: 106/54   BP Location: Right arm   Patient Position: Sitting   Cuff Size: Adult   Pulse: 92   SpO2: 95%   Weight: 107 kg (235 lb)   Height: 182.9 cm (72\")     Body mass index is 31.87 kg/m².  Physical Exam  Vitals reviewed.   Constitutional:       General: He is not in acute distress.     Appearance: He is ill-appearing.   Pulmonary:      Comments: Home o2 by [place].  Increased respiratory effort at rest and during interview (reported baseline).   Skin:     Coloration: Skin is not pale.   Neurological:      Mental Status: He is alert.   Psychiatric:         Mood and Affect: Mood normal.         Behavior: Behavior normal. Behavior is cooperative.              Result " Review  Data Reviewed:{ Labs  Result Review  Imaging  Med Tab  Media :23}     Lab Results   Component Value Date    WBC 11.22 (H) 09/05/2021    HGB 14.2 09/05/2021    HCT 43.6 09/05/2021    MCV 91.6 09/05/2021     09/05/2021     Lab Results   Component Value Date    GLUCOSE 100 (H) 09/06/2021    CALCIUM 9.0 09/06/2021     (L) 09/06/2021    K 4.1 09/06/2021    CO2 22.0 09/06/2021    CL 99 09/06/2021    BUN 25 (H) 09/06/2021    CREATININE 1.40 (H) 09/06/2021    EGFRIFNONA 50 (L) 09/06/2021    BCR 17.9 09/06/2021    ANIONGAP 14.0 09/06/2021       Assessment and Plan {CC Problem List  Visit Diagnosis  ROS  Review (Popup)  Health Maintenance  Quality  BestPractice  Medications  SmartSets  SnapShot Encounters  Media :23}   1. Systolic CHF, chronic (HCC)    EF 46%  Continue Lasix, metoprolol succinate  Max tolerated dose due to hypotension     Heart failure education discussed: What is heart failure, causes, signs and symptoms, medication management, daily weight monitoring, low-sodium diet of less than 2 g per day, daily exercise, role the heart failure center.     S/s in setting of COVID 19 PNA and chronic lung dx.      2. Ischemic cardiomyopathy  Asa, statin  Isordil  No CP    3. HTN (hypertension), benign  Low normal BP  No s/s hypotension  Continue to monitor    4. Stage 3 chronic kidney disease, unspecified whether stage 3a or 3b CKD (HCC)    - Basic Metabolic Panel; 1-2 weeks with PCP (mail order to patient)      Follow-up with [place] cardiology as scheduled.  Discussed follow-up in the heart valve center.  Patient at this time does not feel that he can keep clinic follow-up visits due to transportation issues.  Patient will follow up on an as-needed basis or as determined by cardiology.  Reviewed signs and symptoms of heart failure worsening role the heart and valve center and when to call.      I spent 20 minutes caring for Morgan on this date of service. This time includes time spent  by me in the following activities:preparing for the visit, reviewing tests, performing a medically appropriate examination and/or evaluation , counseling and educating the patient/family/caregiver, ordering medications, tests, or procedures and documenting information in the medical record    Follow Up {Instructions Charge Capture  Follow-up Communications :23}   Return if symptoms worsen or fail to improve.    Patient was given instructions and counseling regarding his condition or for health maintenance advice. Please see specific information pulled into the AVS if appropriate.  Patient was instructed to call the Heart and Valve Center with any questions, concerns, or worsening symptoms.    *Please note that portions of this note were completed with a voice recognition program. Efforts were made to edit the dictations, but occasionally words are mistranscribed.

## 2021-09-29 NOTE — TELEPHONE ENCOUNTER
Please mail BMP order to patient.  They are aware they will be receiving an order in the mail.   
Negative

## 2021-09-30 ENCOUNTER — PATIENT OUTREACH (OUTPATIENT)
Dept: CASE MANAGEMENT | Facility: OTHER | Age: 71
End: 2021-09-30

## 2021-09-30 NOTE — OUTREACH NOTE
Ambulatory Case Management Note    Patient Outreach    RN-ACM outreach to patient. Conversation this date with patient's spouse, Cadence.  Patient recently completed Readmission Management.  Case returned to RN-ACM for additional outreach.      Per spouse, patient continues to improved.  He followed with Cardiology yesterday.  She denied unmet needs/concerns for RN-ACM to address.  Patient has home [address] and the couple drive themself as needed locally.  The couple prefer to utilize medical services in their local community.      Spouse expressed appreciation for outreach and declined engagement for ongoing HRCM. Tom is active and AD noted as filed.  Non-[facility] PCP so records are not available.      RN-ACM contact information provided and patient/spouse encouraged to call with care coordination needs.     SDOH updated and reviewed with the patient during this program:    SDOH questionnaire forwarded to Tom this encounter.     General & Health Literacy Assessment    Questions/Answers      Most Recent Value   Assessment Completed With  Spouse or Significant Other   Living Arrangement  Spouse   Type of Residence  Private Residence   Equiptment Used at Home  Oxygen/Respiratory Treatment [[facility] is DME provider for home oxygen.  Chronic use at 2-3L nc]   Bed or Wheelchair Confined  No   Difficulty Keeping Appointments  -- [Traveling away from local community is difficult. ]          Karlie Tejada RN  Ambulatory Case Management    9/30/2021, 15:19 EDT

## 2021-11-11 NOTE — PROGRESS NOTES
[facility]  Office visit  Morgan Luna  1950  [phone]    VISIT DATE:  11/11/2021      PCP: Risa Morales MD  [address] [place] [id number]    CC:  Chief Complaint   Patient presents with   • Coronary Artery Disease   • Shortness of Breath       PROBLEM LIST:  1. Ischemic cardiomyopathy:  a. History of 4-vessel CABG in 1996, incomplete database.  b. History of post-CABG stenting, 2004, incomplete database.  c. Status post pacemaker defibrillator implantation, 2015, Dr. Fernandez.  2. Black lung disease/chronic obstructive pulmonary disease/ongoing tobacco.  3. Anxiety.  4. Benign hypertension.  5. Hypercholesterolemia.  6. Acid reflux.  7. Family history.  8. Surgical history:  a. Coronary artery bypass grafting.  b. Back surgery.  c. ICD implantation.    March 2019 echo   Endocardial definition too poor to make an accurate estimation of LVEF,   would require IV echocontrast or alternative imaging modality.   Moderately increased left ventricular cavity size.   There is mild concentric left ventricular hypertrophy.   Diastolic filling parameters suggests grade I diastolic dysfunction .    August 2021 Kelli scan myocardial perfusion imaging   -Large apical infarct with associated apical akinesis.    -No ischemia.    -Left ventricular ejection fraction of 35 %.    -Overall findings represent a high risk scan.     September 2021 TTE  · The study is technically suboptimal for diagnosis. The quality of the study is limited due to patient body habitus, patient positioning and lung disease.  · Left ventricular ejection fraction appears to be 46 - 50%. Left ventricular systolic function is low normal.  · Left ventricular wall thickness is consistent with mild concentric hypertrophy.  · The following left ventricular wall segments are dyskinetic: apex.      ASSESSMENT:   Diagnosis Plan   1. Coronary artery disease involving native coronary artery of native heart  "without angina pectoris     2. Heart failure with reduced ejection fraction (HCC)     3. HTN (hypertension), benign     4. Hypercholesteremia       Device interrogation:  [place] scientific biventricular ICD  7% atrial pacing, sensed P wave 1.3 mV, threshold 0.9 V at 0.5 ms, impedance 655 ohms  97% BiV pacing  Sensed R wave 8.4 mV, threshold 1.1 V at 0.5 ms, impedance 500 ohms  Sensed LV 13.8 mV, threshold 2.5 V at 2 ms, impedance 587 ohms  Estimated battery life 1.5 years  5% AT/A. fib, longest duration 48 hours, spiking activity during Covid pneumonia in September.    PLAN:  Coronary artery disease: Currently stable and asymptomatic.  Continue current medical therapy.  Off antiplatelet therapy in the setting of chronic anticoagulation.    Congestive heart failure, chronic, systolic: Currently euvolemic and compensated.  On Max tolerated medical therapy, further titration limited by symptomatic hypotension.  Continue routine follow-up in device clinic.      Hyperlipidemia: Goal LDL less than 70.  Currently well controlled. Continue current medical therapy    Nonsustained ventricular tachycardia: Continue beta-blockade, continue metoprolol succinate 25 mg p.o. Daily.    Paroxysmal atrial flutter: Continue Eliquis 5 mg p.o. twice daily for stroke prophylaxis.  Continue metoprolol succinate for rate control.  Trending burden of arrhythmia through device interrogation.    Subjective  Most of his limitations are related to underlying black lung, currently on 3 L nasal cannula oxygen.  Has recovered from recent admission in September for Covid pneumonia.  Blood pressures running less than 110/70 mmHg.    PHYSICAL EXAMINATION:  Vitals:    11/11/21 1025   BP: 98/58   BP Location: Right arm   Patient Position: Sitting   Weight: 108 kg (238 lb)   Height: 182.9 cm (72\")     General Appearance:    Alert, cooperative, no distress, appears stated age   Head:    Normocephalic, without obvious abnormality, atraumatic   Eyes:    " conjunctiva/corneas clear   Nose:   Nares normal, septum midline, mucosa normal, no drainage   Throat:   Lips, teeth and gums normal   Neck:   Supple, symmetrical, trachea midline, no carotid    bruit or JVD   Lungs:     Clear to auscultation bilaterally, respirations unlabored   Chest Wall:    No tenderness or deformity    Heart:    Regular rate and rhythm, S1 and S2 normal, no murmur, rub   or gallop, normal carotid impulse bilaterally without bruit.   Abdomen:     Soft, non-tender   Extremities:   Extremities normal, atraumatic, no cyanosis or edema   Pulses:   2+ and symmetric all extremities   Skin:   Skin color, texture, turgor normal, no rashes or lesions       Diagnostic Data:  Procedures  Lab Results   Component Value Date    CHLPL 134 02/08/2018    TRIG 133 02/08/2018    HDL 41 02/08/2018     Lab Results   Component Value Date    GLUCOSE 100 (H) 09/06/2021    BUN 25 (H) 09/06/2021    CREATININE 1.40 (H) 09/06/2021     (L) 09/06/2021    K 4.1 09/06/2021    CL 99 09/06/2021    CO2 22.0 09/06/2021     Lab Results   Component Value Date    HGBA1C 5.90 (H) 09/01/2021     Lab Results   Component Value Date    WBC 11.22 (H) 09/05/2021    HGB 14.2 09/05/2021    HCT 43.6 09/05/2021     09/05/2021       Allergies  Allergies   Allergen Reactions   • Codeine      GI UPSET       Current Medications    Current Outpatient Medications:   •  albuterol (PROVENTIL HFA;VENTOLIN HFA) 108 (90 BASE) MCG/ACT inhaler, Inhale 2 puffs Every 4 (Four) Hours As Needed for Wheezing or Shortness of Air., Disp: , Rfl:   •  albuterol (PROVENTIL) (2.5 MG/3ML) 0.083% nebulizer solution, Take 2.5 mg by nebulization Daily As Needed for Wheezing or Shortness of Air., Disp: , Rfl: 2  •  apixaban (ELIQUIS) 5 MG tablet tablet, Take 1 tablet by mouth 2 (Two) Times a Day. (Stop aspirin when starting Eliquis), Disp: 60 tablet, Rfl: 5  •  Budeson-Glycopyrrol-Formoterol (Breztri Aerosphere) 160-9-4.8 MCG/ACT aerosol inhaler, Inhale 2 puffs  2 (Two) Times a Day., Disp: , Rfl:   •  furosemide (LASIX) 20 MG tablet, Take 1 tablet by mouth As Needed (edema). (Patient taking differently: Take 20 mg by mouth Daily As Needed (edema).), Disp: 30 tablet, Rfl: 11  •  isosorbide dinitrate (ISORDIL) 20 MG tablet, TAKE 2 TABS DAILY, Disp: 180 tablet, Rfl: 0  •  O2 (OXYGEN), Inhale 3 L/min Continuous., Disp: , Rfl:   •  tamsulosin (FLOMAX) 0.4 MG capsule 24 hr capsule, Take 1 capsule by mouth Every Night., Disp: , Rfl:   •  acetaminophen (Tylenol 8 Hour) 650 MG 8 hr tablet, Take 1-2 tablets by mouth As Needed., Disp: , Rfl:   •  atorvastatin (LIPITOR) 10 MG tablet, Take 1 tablet by mouth Every Night., Disp: 30 tablet, Rfl: 11  •  famotidine (PEPCID) 20 MG tablet, Take 1 tablet by mouth Daily., Disp: , Rfl:   •  metoprolol succinate XL (TOPROL-XL) 25 MG 24 hr tablet, Take 1 tablet by mouth Daily., Disp: 90 tablet, Rfl: 3  •  nitroglycerin (NITROSTAT) 0.4 MG SL tablet, Place 1 tablet under the tongue Every 5 (Five) Minutes As Needed for Chest Pain. Take no more than 3 doses in 15 minutes., Disp: 30 tablet, Rfl: 3  •  polyethylene glycol (MIRALAX) 17 g packet, Take 17 g by mouth Daily As Needed., Disp: , Rfl:   •  traZODone (DESYREL) 50 MG tablet, Take 50 mg by mouth Every Night., Disp: , Rfl:           ROS  Review of Systems   Cardiovascular: Positive for dyspnea on exertion, orthopnea and paroxysmal nocturnal dyspnea. Negative for irregular heartbeat.   Respiratory: Positive for cough, shortness of breath, sleep disturbances due to breathing and sputum production. Negative for snoring and wheezing.    Neurological: Positive for dizziness.       SOCIAL HX  Social History     Socioeconomic History   • Marital status:    Tobacco Use   • Smoking status: Former Smoker     Packs/day: 1.00     Types: Cigarettes     Quit date: 2016     Years since quittin.0   • Smokeless tobacco: Never Used   Vaping Use   • Vaping Use: Never used   Substance and Sexual Activity    • Alcohol use: No   • Drug use: No   • Sexual activity: Defer       FAMILY HX  Family History   Problem Relation Age of Onset   • Heart disease Mother    • Alzheimer's disease Mother    • Cancer Father    • Heart attack Sister    • Diabetes Brother    • Lung disease Brother    • Kidney disease Brother    • Colon cancer Brother              Vinayak Felix III, MD, [facility]

## 2021-11-16 NOTE — TELEPHONE ENCOUNTER
On 9/29/2021 BMP order was mailed to patient to be completed in 1 to 2 weeks.  I have not received results.     I attempted to call the patient.  Unsuccessful at reaching.      Please follow-up with patient.

## 2022-01-01 ENCOUNTER — OFFICE VISIT (OUTPATIENT)
Dept: CARDIOLOGY | Facility: CLINIC | Age: 72
End: 2022-01-01

## 2022-01-01 ENCOUNTER — APPOINTMENT (OUTPATIENT)
Dept: GENERAL RADIOLOGY | Facility: HOSPITAL | Age: 72
End: 2022-01-01

## 2022-01-01 ENCOUNTER — HOSPITAL ENCOUNTER (EMERGENCY)
Facility: HOSPITAL | Age: 72
Discharge: LEFT BEFORE DISCHARGE | End: 2022-10-07
Attending: EMERGENCY MEDICINE | Admitting: EMERGENCY MEDICINE

## 2022-01-01 ENCOUNTER — TELEPHONE (OUTPATIENT)
Dept: CARDIOLOGY | Facility: CLINIC | Age: 72
End: 2022-01-01

## 2022-01-01 ENCOUNTER — APPOINTMENT (OUTPATIENT)
Dept: CT IMAGING | Facility: HOSPITAL | Age: 72
End: 2022-01-01

## 2022-01-01 ENCOUNTER — HOSPITAL ENCOUNTER (OUTPATIENT)
Dept: CARDIOLOGY | Facility: HOSPITAL | Age: 72
Discharge: HOME | End: 2022-04-08
Attending: INTERNAL MEDICINE | Admitting: INTERNAL MEDICINE

## 2022-01-01 ENCOUNTER — PREP FOR SURGERY (OUTPATIENT)
Dept: OTHER | Facility: HOSPITAL | Age: 72
End: 2022-01-01

## 2022-01-01 VITALS
OXYGEN SATURATION: 90 % | BODY MASS INDEX: 32.1 KG/M2 | SYSTOLIC BLOOD PRESSURE: 104 MMHG | HEART RATE: 73 BPM | WEIGHT: 237 LBS | DIASTOLIC BLOOD PRESSURE: 50 MMHG | HEIGHT: 72 IN

## 2022-01-01 VITALS
OXYGEN SATURATION: 100 % | WEIGHT: 235.89 LBS | HEIGHT: 72 IN | TEMPERATURE: 97.6 F | BODY MASS INDEX: 31.95 KG/M2 | DIASTOLIC BLOOD PRESSURE: 71 MMHG | SYSTOLIC BLOOD PRESSURE: 108 MMHG | HEART RATE: 68 BPM | RESPIRATION RATE: 17 BRPM

## 2022-01-01 VITALS
OXYGEN SATURATION: 92 % | RESPIRATION RATE: 20 BRPM | TEMPERATURE: 98.4 F | BODY MASS INDEX: 32.37 KG/M2 | SYSTOLIC BLOOD PRESSURE: 128 MMHG | WEIGHT: 239 LBS | HEART RATE: 80 BPM | HEIGHT: 72 IN | DIASTOLIC BLOOD PRESSURE: 79 MMHG

## 2022-01-01 DIAGNOSIS — I48.19 ATRIAL FIBRILLATION, PERSISTENT: ICD-10-CM

## 2022-01-01 DIAGNOSIS — K80.21 CALCULUS OF GALLBLADDER WITH BILIARY OBSTRUCTION BUT WITHOUT CHOLECYSTITIS: ICD-10-CM

## 2022-01-01 DIAGNOSIS — I48.19 ATRIAL FIBRILLATION, PERSISTENT: Primary | ICD-10-CM

## 2022-01-01 DIAGNOSIS — K85.90 ACUTE PANCREATITIS WITHOUT INFECTION OR NECROSIS, UNSPECIFIED PANCREATITIS TYPE: Primary | ICD-10-CM

## 2022-01-01 DIAGNOSIS — E78.00 HYPERCHOLESTEREMIA: ICD-10-CM

## 2022-01-01 DIAGNOSIS — D72.829 LEUKOCYTOSIS, UNSPECIFIED TYPE: ICD-10-CM

## 2022-01-01 DIAGNOSIS — Z53.21 ELOPED FROM EMERGENCY DEPARTMENT: ICD-10-CM

## 2022-01-01 DIAGNOSIS — E78.5 HYPERLIPIDEMIA LDL GOAL <70: ICD-10-CM

## 2022-01-01 DIAGNOSIS — I25.5 ISCHEMIC CARDIOMYOPATHY: ICD-10-CM

## 2022-01-01 DIAGNOSIS — I25.10 CORONARY ARTERY DISEASE INVOLVING NATIVE CORONARY ARTERY OF NATIVE HEART WITHOUT ANGINA PECTORIS: Primary | ICD-10-CM

## 2022-01-01 DIAGNOSIS — I48.19 PERSISTENT ATRIAL FIBRILLATION: ICD-10-CM

## 2022-01-01 DIAGNOSIS — I10 HTN (HYPERTENSION), BENIGN: ICD-10-CM

## 2022-01-01 LAB
ALBUMIN SERPL-MCNC: 4 G/DL (ref 3.5–5.2)
ALBUMIN SERPL-MCNC: 4.1 G/DL (ref 3.5–5.2)
ALBUMIN/GLOB SERPL: 1 G/DL
ALBUMIN/GLOB SERPL: 1 G/DL
ALP SERPL-CCNC: 122 U/L (ref 39–117)
ALP SERPL-CCNC: 160 U/L (ref 39–117)
ALT SERPL W P-5'-P-CCNC: 14 U/L (ref 1–41)
ALT SERPL W P-5'-P-CCNC: 68 U/L (ref 1–41)
ANION GAP SERPL CALCULATED.3IONS-SCNC: 10 MMOL/L (ref 5–15)
ANION GAP SERPL CALCULATED.3IONS-SCNC: 16 MMOL/L (ref 5–15)
AST SERPL-CCNC: 101 U/L (ref 1–40)
AST SERPL-CCNC: 16 U/L (ref 1–40)
BASOPHILS # BLD AUTO: 0.04 10*3/MM3 (ref 0–0.2)
BASOPHILS NFR BLD AUTO: 0.2 % (ref 0–1.5)
BILIRUB SERPL-MCNC: 0.7 MG/DL (ref 0–1.2)
BILIRUB SERPL-MCNC: 1.1 MG/DL (ref 0–1.2)
BUN SERPL-MCNC: 11 MG/DL (ref 8–23)
BUN SERPL-MCNC: 20 MG/DL (ref 8–23)
BUN/CREAT SERPL: 10.4 (ref 7–25)
BUN/CREAT SERPL: 6.3 (ref 7–25)
CALCIUM SPEC-SCNC: 9.1 MG/DL (ref 8.6–10.5)
CALCIUM SPEC-SCNC: 9.5 MG/DL (ref 8.6–10.5)
CHLORIDE SERPL-SCNC: 101 MMOL/L (ref 98–107)
CHLORIDE SERPL-SCNC: 95 MMOL/L (ref 98–107)
CO2 SERPL-SCNC: 22 MMOL/L (ref 22–29)
CO2 SERPL-SCNC: 24 MMOL/L (ref 22–29)
CREAT SERPL-MCNC: 1.75 MG/DL (ref 0.76–1.27)
CREAT SERPL-MCNC: 1.92 MG/DL (ref 0.76–1.27)
DEPRECATED RDW RBC AUTO: 52.2 FL (ref 37–54)
DEPRECATED RDW RBC AUTO: 55.4 FL (ref 37–54)
EGFRCR SERPLBLD CKD-EPI 2021: 36.1 ML/MIN/1.73
EGFRCR SERPLBLD CKD-EPI 2021: 40.4 ML/MIN/1.73
EOSINOPHIL # BLD AUTO: 0.1 10*3/MM3 (ref 0–0.4)
EOSINOPHIL NFR BLD AUTO: 0.4 % (ref 0.3–6.2)
ERYTHROCYTE [DISTWIDTH] IN BLOOD BY AUTOMATED COUNT: 15.7 % (ref 12.3–15.4)
ERYTHROCYTE [DISTWIDTH] IN BLOOD BY AUTOMATED COUNT: 15.9 % (ref 12.3–15.4)
GLOBULIN UR ELPH-MCNC: 4 GM/DL
GLOBULIN UR ELPH-MCNC: 4.1 GM/DL
GLUCOSE SERPL-MCNC: 104 MG/DL (ref 65–99)
GLUCOSE SERPL-MCNC: 182 MG/DL (ref 65–99)
HCT VFR BLD AUTO: 42.2 % (ref 37.5–51)
HCT VFR BLD AUTO: 43.6 % (ref 37.5–51)
HGB BLD-MCNC: 13.8 G/DL (ref 13–17.7)
HGB BLD-MCNC: 14 G/DL (ref 13–17.7)
HOLD SPECIMEN: NORMAL
IMM GRANULOCYTES # BLD AUTO: 0.18 10*3/MM3 (ref 0–0.05)
IMM GRANULOCYTES NFR BLD AUTO: 0.7 % (ref 0–0.5)
LIPASE SERPL-CCNC: >3000 U/L (ref 13–60)
LYMPHOCYTES # BLD AUTO: 1.51 10*3/MM3 (ref 0.7–3.1)
LYMPHOCYTES NFR BLD AUTO: 5.8 % (ref 19.6–45.3)
MAXIMAL PREDICTED HEART RATE: 146 BPM
MCH RBC QN AUTO: 29.7 PG (ref 26.6–33)
MCH RBC QN AUTO: 30.4 PG (ref 26.6–33)
MCHC RBC AUTO-ENTMCNC: 32.1 G/DL (ref 31.5–35.7)
MCHC RBC AUTO-ENTMCNC: 32.7 G/DL (ref 31.5–35.7)
MCV RBC AUTO: 90.9 FL (ref 79–97)
MCV RBC AUTO: 94.6 FL (ref 79–97)
MONOCYTES # BLD AUTO: 1.79 10*3/MM3 (ref 0.1–0.9)
MONOCYTES NFR BLD AUTO: 6.8 % (ref 5–12)
NEUTROPHILS NFR BLD AUTO: 22.63 10*3/MM3 (ref 1.7–7)
NEUTROPHILS NFR BLD AUTO: 86.1 % (ref 42.7–76)
NRBC BLD AUTO-RTO: 0 /100 WBC (ref 0–0.2)
NT-PROBNP SERPL-MCNC: 576.1 PG/ML (ref 0–900)
PLATELET # BLD AUTO: 315 10*3/MM3 (ref 140–450)
PLATELET # BLD AUTO: 365 10*3/MM3 (ref 140–450)
PMV BLD AUTO: 8.5 FL (ref 6–12)
PMV BLD AUTO: 8.9 FL (ref 6–12)
POTASSIUM SERPL-SCNC: 3.3 MMOL/L (ref 3.5–5.2)
POTASSIUM SERPL-SCNC: 4.4 MMOL/L (ref 3.5–5.2)
PROT SERPL-MCNC: 8.1 G/DL (ref 6–8.5)
PROT SERPL-MCNC: 8.1 G/DL (ref 6–8.5)
QT INTERVAL: 484 MS
QT INTERVAL: 488 MS
QTC INTERVAL: 556 MS
QTC INTERVAL: 585 MS
RBC # BLD AUTO: 4.61 10*6/MM3 (ref 4.14–5.8)
RBC # BLD AUTO: 4.64 10*6/MM3 (ref 4.14–5.8)
SODIUM SERPL-SCNC: 133 MMOL/L (ref 136–145)
SODIUM SERPL-SCNC: 135 MMOL/L (ref 136–145)
STRESS TARGET HR: 124 BPM
TROPONIN T SERPL-MCNC: <0.01 NG/ML (ref 0–0.03)
WBC NRBC COR # BLD: 10.74 10*3/MM3 (ref 3.4–10.8)
WBC NRBC COR # BLD: 26.25 10*3/MM3 (ref 3.4–10.8)
WHOLE BLOOD HOLD COAG: NORMAL
WHOLE BLOOD HOLD SPECIMEN: NORMAL

## 2022-01-01 PROCEDURE — 85027 COMPLETE CBC AUTOMATED: CPT | Performed by: PHYSICIAN ASSISTANT

## 2022-01-01 PROCEDURE — 85025 COMPLETE CBC W/AUTO DIFF WBC: CPT | Performed by: EMERGENCY MEDICINE

## 2022-01-01 PROCEDURE — 93295 DEV INTERROG REMOTE 1/2/MLT: CPT | Performed by: INTERNAL MEDICINE

## 2022-01-01 PROCEDURE — 84484 ASSAY OF TROPONIN QUANT: CPT | Performed by: EMERGENCY MEDICINE

## 2022-01-01 PROCEDURE — 80053 COMPREHEN METABOLIC PANEL: CPT | Performed by: EMERGENCY MEDICINE

## 2022-01-01 PROCEDURE — 71045 X-RAY EXAM CHEST 1 VIEW: CPT

## 2022-01-01 PROCEDURE — 80053 COMPREHEN METABOLIC PANEL: CPT | Performed by: PHYSICIAN ASSISTANT

## 2022-01-01 PROCEDURE — 92960 CARDIOVERSION ELECTRIC EXT: CPT | Performed by: INTERNAL MEDICINE

## 2022-01-01 PROCEDURE — 93005 ELECTROCARDIOGRAM TRACING: CPT | Performed by: PHYSICIAN ASSISTANT

## 2022-01-01 PROCEDURE — 93010 ELECTROCARDIOGRAM REPORT: CPT | Performed by: INTERNAL MEDICINE

## 2022-01-01 PROCEDURE — 93284 PRGRMG EVAL IMPLANTABLE DFB: CPT | Performed by: INTERNAL MEDICINE

## 2022-01-01 PROCEDURE — 36415 COLL VENOUS BLD VENIPUNCTURE: CPT

## 2022-01-01 PROCEDURE — 93005 ELECTROCARDIOGRAM TRACING: CPT | Performed by: EMERGENCY MEDICINE

## 2022-01-01 PROCEDURE — 83690 ASSAY OF LIPASE: CPT | Performed by: EMERGENCY MEDICINE

## 2022-01-01 PROCEDURE — 93296 REM INTERROG EVL PM/IDS: CPT | Performed by: INTERNAL MEDICINE

## 2022-01-01 PROCEDURE — 74176 CT ABD & PELVIS W/O CONTRAST: CPT

## 2022-01-01 PROCEDURE — 99283 EMERGENCY DEPT VISIT LOW MDM: CPT

## 2022-01-01 PROCEDURE — 99214 OFFICE O/P EST MOD 30 MIN: CPT | Performed by: INTERNAL MEDICINE

## 2022-01-01 PROCEDURE — 92960 CARDIOVERSION ELECTRIC EXT: CPT

## 2022-01-01 PROCEDURE — 83880 ASSAY OF NATRIURETIC PEPTIDE: CPT | Performed by: EMERGENCY MEDICINE

## 2022-01-01 RX ORDER — FLUMAZENIL 0.1 MG/ML
INJECTION INTRAVENOUS
Status: DISCONTINUED
Start: 2022-01-01 | End: 2022-01-01 | Stop reason: WASHOUT

## 2022-01-01 RX ORDER — ROFLUMILAST 500 UG/1
500 TABLET ORAL DAILY
COMMUNITY
Start: 2022-01-01

## 2022-01-01 RX ORDER — ISOSORBIDE DINITRATE 20 MG/1
TABLET ORAL
Qty: 180 TABLET | Refills: 0 | Status: SHIPPED | OUTPATIENT
Start: 2022-01-01 | End: 2022-01-01 | Stop reason: SDUPTHER

## 2022-01-01 RX ORDER — SODIUM CHLORIDE 0.9 % (FLUSH) 0.9 %
10 SYRINGE (ML) INJECTION EVERY 12 HOURS SCHEDULED
Status: DISCONTINUED | OUTPATIENT
Start: 2022-01-01 | End: 2022-01-01 | Stop reason: HOSPADM

## 2022-01-01 RX ORDER — MIDAZOLAM HYDROCHLORIDE 1 MG/ML
INJECTION INTRAMUSCULAR; INTRAVENOUS
Status: DISCONTINUED
Start: 2022-01-01 | End: 2022-01-01 | Stop reason: WASHOUT

## 2022-01-01 RX ORDER — AMIODARONE HYDROCHLORIDE 200 MG/1
200 TABLET ORAL 2 TIMES DAILY
Qty: 60 TABLET | Refills: 0 | Status: ON HOLD | OUTPATIENT
Start: 2022-01-01 | End: 2022-01-01 | Stop reason: SDUPTHER

## 2022-01-01 RX ORDER — SODIUM CHLORIDE 0.9 % (FLUSH) 0.9 %
10 SYRINGE (ML) INJECTION AS NEEDED
Status: CANCELLED | OUTPATIENT
Start: 2022-01-01

## 2022-01-01 RX ORDER — ACETAMINOPHEN 325 MG/1
650 TABLET ORAL EVERY 4 HOURS PRN
Status: CANCELLED | OUTPATIENT
Start: 2022-01-01

## 2022-01-01 RX ORDER — HYDROMORPHONE HYDROCHLORIDE 1 MG/ML
0.25 INJECTION, SOLUTION INTRAMUSCULAR; INTRAVENOUS; SUBCUTANEOUS ONCE
Status: DISCONTINUED | OUTPATIENT
Start: 2022-01-01 | End: 2022-01-01 | Stop reason: HOSPADM

## 2022-01-01 RX ORDER — SODIUM CHLORIDE 0.9 % (FLUSH) 0.9 %
10 SYRINGE (ML) INJECTION AS NEEDED
Status: DISCONTINUED | OUTPATIENT
Start: 2022-01-01 | End: 2022-01-01 | Stop reason: HOSPADM

## 2022-01-01 RX ORDER — SODIUM CHLORIDE 0.9 % (FLUSH) 0.9 %
10 SYRINGE (ML) INJECTION EVERY 12 HOURS SCHEDULED
Status: CANCELLED | OUTPATIENT
Start: 2022-01-01

## 2022-01-01 RX ORDER — ATORVASTATIN CALCIUM 10 MG/1
10 TABLET, FILM COATED ORAL NIGHTLY
Qty: 30 TABLET | Refills: 11 | Status: SHIPPED | OUTPATIENT
Start: 2022-01-01

## 2022-01-01 RX ORDER — ACETAMINOPHEN 325 MG/1
650 TABLET ORAL EVERY 4 HOURS PRN
Status: DISCONTINUED | OUTPATIENT
Start: 2022-01-01 | End: 2022-01-01 | Stop reason: HOSPADM

## 2022-01-01 RX ORDER — AMIODARONE HYDROCHLORIDE 200 MG/1
200 TABLET ORAL DAILY
Qty: 90 TABLET | Refills: 1 | Status: SHIPPED | OUTPATIENT
Start: 2022-01-01

## 2022-01-01 RX ORDER — ASPIRIN 81 MG/1
324 TABLET, CHEWABLE ORAL ONCE
Status: DISCONTINUED | OUTPATIENT
Start: 2022-01-01 | End: 2022-01-01 | Stop reason: HOSPADM

## 2022-01-01 RX ORDER — AMITRIPTYLINE HYDROCHLORIDE 75 MG/1
TABLET, FILM COATED ORAL NIGHTLY
COMMUNITY

## 2022-01-01 RX ORDER — ONDANSETRON 2 MG/ML
4 INJECTION INTRAMUSCULAR; INTRAVENOUS ONCE
Status: DISCONTINUED | OUTPATIENT
Start: 2022-01-01 | End: 2022-01-01 | Stop reason: HOSPADM

## 2022-01-01 RX ORDER — ISOSORBIDE DINITRATE 20 MG/1
TABLET ORAL
Qty: 180 TABLET | Refills: 3 | Status: SHIPPED | OUTPATIENT
Start: 2022-01-01

## 2022-03-25 NOTE — TELEPHONE ENCOUNTER
----- Message from Vinayak Felix III, MD sent at 3/25/2022  9:34 AM EDT -----  Pacemaker revealing persistent atrial fibrillation.  Needs to start amiodarone 200 mg p.o. twice daily and be scheduled for an outpatient cardioversion in 2 to 3 weeks.

## 2022-04-08 PROBLEM — I48.92 ATRIAL FLUTTER (HCC): Status: ACTIVE | Noted: 2022-01-01

## 2022-04-08 PROBLEM — I48.19 PERSISTENT ATRIAL FIBRILLATION: Status: ACTIVE | Noted: 2022-01-01

## 2022-04-08 PROBLEM — Z95.810 PRESENCE OF BIVENTRICULAR IMPLANTABLE CARDIOVERTER-DEFIBRILLATOR (ICD): Status: ACTIVE | Noted: 2022-01-01

## 2022-04-08 PROBLEM — I50.20 HEART FAILURE WITH REDUCED EJECTION FRACTION (HCC): Status: RESOLVED | Noted: 2021-09-01 | Resolved: 2022-01-01

## 2022-04-08 NOTE — H&P
[facility] at [facility]  Cardiovascular History and Physical Note         Patient is a 72-year-old gentleman with a significant cardiovascular history that includes ischemic cardiomyopathy, systolic heart failure, coronary artery disease, biventricular ICD, persistent atrial fibrillation/flutter, hypertension and hyperlipidemia who presents today to undergo an external cardioversion for his persistent atrial fibrillation.  Patient's last device transmission on 3/25/2021 showed persistent atrial fibrillation.  He was started on p.o. amiodarone 200 mg twice daily and scheduled for a cardioversion.    Cardiac risk factors: Advanced age, known CAD, hypertension, hyperlipidemia, smoker    Past medical and surgical history, social and family history reviewed in EMR.    REVIEW OF SYSTEMS:   H&P ROS reviewed and pertinent CV ROS as noted in HPI.         Vital Sign Min/Max for last 24 hours  Temp  Min: 97.6 °F (36.4 °C)  Max: 97.6 °F (36.4 °C)   BP  Min: 121/78  Max: 131/89   Pulse  Min: 86  Max: 86   Resp  Min: 17  Max: 17   SpO2  Min: 99 %  Max: 100 %   No data recorded    No intake or output data in the 24 hours ending 04/08/22 1028        Constitutional:       Appearance: Healthy appearance. Well-developed.   Eyes:      General: Lids are normal. No scleral icterus.     Conjunctiva/sclera: Conjunctivae normal.   HENT:      Head: Normocephalic and atraumatic.   Neck:      Thyroid: No thyromegaly.      Vascular: No carotid bruit or JVD.   Pulmonary:      Effort: Pulmonary effort is normal.      Breath sounds: Normal breath sounds. No wheezing. No rhonchi. No rales.   Cardiovascular:      Normal rate. Regular rhythm.      Murmurs: There is no murmur.      No gallop. No rub.   Pulses:     Intact distal pulses.   Edema:     Peripheral edema absent.   Abdominal:      General: There is no distension.      Palpations: Abdomen is soft. There is no abdominal mass.   Musculoskeletal:      Cervical back:  Normal range of motion. Skin:     General: Skin is warm and dry.      Findings: No rash.   Neurological:      General: No focal deficit present.      Mental Status: Alert and oriented to person, place, and time.      Gait: Gait is intact.   Psychiatric:         Attention and Perception: Attention normal.         Mood and Affect: Mood normal.         Behavior: Behavior normal.       EKG: BiV paced 4/8/2022    Lab Review:   Labs reviewed in the electronic medical record.  Pertinent findings include:  Lab Results   Component Value Date    GLUCOSE 104 (H) 04/08/2022    BUN 11 04/08/2022    CREATININE 1.75 (H) 04/08/2022    EGFRIFNONA 50 (L) 09/06/2021    BCR 6.3 (L) 04/08/2022    K 4.4 04/08/2022    CO2 24.0 04/08/2022    CALCIUM 9.5 04/08/2022    ALBUMIN 4.10 04/08/2022    AST 16 04/08/2022    ALT 14 04/08/2022     Lab Results   Component Value Date    WBC 10.74 04/08/2022    HGB 13.8 04/08/2022    HCT 42.2 04/08/2022    MCV 90.9 04/08/2022     04/08/2022     Lab Results   Component Value Date    CHLPL 134 02/08/2018    TRIG 133 02/08/2018    HDL 41 02/08/2018    LDL 66 02/08/2018                Active Hospital Problems    Diagnosis    • **Persistent atrial fibrillation (HCC)      · Atrial flutter noted on device interrogation at visit 11/2021   · Chads vasc= 4, anticoagulated with Eliquis  · Device transmission 3/25/2021 showed persistent atrial fibrillation, started on p.o. amiodarone 200 mg twice daily     • Presence of biventricular implantable cardioverter-defibrillator (ICD)    • Coronary artery disease involving native coronary artery of native heart without angina pectoris      · History of 4-vessel CABG in 1996, incomplete database.  · History of post-CABG stenting, 2004, incomplete database.  · Status post pacemaker defibrillator implantation, 2015, Dr. Fernandez  · MPS (8/2021): Large apical infarct with apical akinesis.  No ischemia.  LVEF 35%.     • Ischemic cardiomyopathy      · History of 4-vessel  CABG in 1996, incomplete database.  · History of post-CABG stenting, 2004, incomplete database.  · Status post pacemaker defibrillator implantation, 2015, Dr. Fernandez  · Echo (3/2019): Too poor to make accurate estimation of LVEF.  Grade 1 diastolic dysfunction.  · Echo (9/4/2021):The study is technically suboptimal for diagnosis. The quality of the study is limited due to patient body habitus, patient positioning and lung disease.  LVEF 46-50%.        • HTN (hypertension), benign    • Hyperlipidemia LDL goal <70    • Systolic CHF, chronic (HCC)    • Current every day smoker      Patient presents today to undergo external cardioversion first persistent atrial fibrillation.  He is chronically anticoagulated with Eliquis without any missed doses.  He has been receiving amiodarone 200 mg twice daily since 3/25.  The risks and benefits of the procedure were discussed and the patient is agreeable to proceed.       · Proceed with external cardioversion  · Further recommendations to follow      OZZIE Oneill

## 2022-04-14 NOTE — TELEPHONE ENCOUNTER
Per [facility] remote monitoring transmission received today:     Ongoing in presenting EGM & since 4/12/2022 @ 05:12 AM.   V-rates controlled.   Report in [facility].     S/P ECV 4/8/2022.

## 2022-09-08 NOTE — PROGRESS NOTES
[facility] at [facility]  Office visit  Morgan Luna  1950  [phone]    VISIT DATE:  9/8/2022      PCP: Rsia Morales MD  [address] [place] [id number]    CC:  Chief Complaint   Patient presents with   • Atrial Fibrillation   • Shortness of Breath       PROBLEM LIST:  1. Ischemic cardiomyopathy:  a. History of 4-vessel CABG in 1996, incomplete database.  b. History of post-CABG stenting, 2004, incomplete database.  c. Status post pacemaker defibrillator implantation, 2015, Dr. Fernandez.  2. Black lung disease/chronic obstructive pulmonary disease/ongoing tobacco.  3. Anxiety.  4. Benign hypertension.  5. Hypercholesterolemia.  6. Acid reflux.  7. Family history.  8. Surgical history:  a. Coronary artery bypass grafting.  b. Back surgery.  c. ICD implantation.    March 2019 echo   Endocardial definition too poor to make an accurate estimation of LVEF,   would require IV echocontrast or alternative imaging modality.   Moderately increased left ventricular cavity size.   There is mild concentric left ventricular hypertrophy.   Diastolic filling parameters suggests grade I diastolic dysfunction .    August 2021 Kelli scan myocardial perfusion imaging   -Large apical infarct with associated apical akinesis.    -No ischemia.    -Left ventricular ejection fraction of 35 %.    -Overall findings represent a high risk scan.     September 2021 TTE  · The study is technically suboptimal for diagnosis. The quality of the study is limited due to patient body habitus, patient positioning and lung disease.  · Left ventricular ejection fraction appears to be 46 - 50%. Left ventricular systolic function is low normal.  · Left ventricular wall thickness is consistent with mild concentric hypertrophy.  · The following left ventricular wall segments are dyskinetic: apex.      ASSESSMENT:   Diagnosis Plan   1. Coronary artery disease involving native coronary artery of native heart  "without angina pectoris     2. HTN (hypertension), benign     3. Hyperlipidemia LDL goal <70     4. Ischemic cardiomyopathy     5. Persistent atrial fibrillation (HCC)       Device interrogation:  [place] [employer] biventricular ICD  Less than 1 % atrial pacing, sensed P wave 2.5 mV, threshold 0.9 V at 0.5 ms, impedance 586 ohms  89 % BiV pacing  Sensed R wave 9.2 mV, threshold 1.3 V at 0.5 ms, impedance 500 ohms  Sensed LV 13.8 mV, threshold 3.5 V at 2 ms, impedance 549 ohms  Estimated battery life 1 years  47% A. fib, 78 mode switches, longest over 48 hours.  Increasing LV threshold, did not increase output due to history of extracardiac stimulation.    PLAN:  Coronary artery disease: Currently stable and asymptomatic.  Continue current medical therapy.  Off antiplatelet therapy in the setting of chronic anticoagulation.    Congestive heart failure, chronic, systolic: Currently euvolemic and compensated.  On Max tolerated medical therapy, further titration limited by symptomatic hypotension.  Continue routine follow-up in device clinic.      Hyperlipidemia: Goal LDL less than 70.  Currently well controlled. Continue current medical therapy    Nonsustained ventricular tachycardia: Continue beta-blockade, continue metoprolol succinate 25 mg p.o. Daily.    Paroxysmal atrial flutter: Eliquis becoming cost prohibitive, he will check on cost of Xarelto 15 mg p.o. daily through his pharmaceutical benefits.  Continue metoprolol succinate for rate control.  Trending burden of arrhythmia through device interrogation.    Subjective  Most of his limitations are related to underlying black lung, currently on 3 L nasal cannula oxygen.  Blood pressures running less than 110/70 mmHg.    PHYSICAL EXAMINATION:  Vitals:    09/08/22 1430   BP: 104/50   BP Location: Left arm   Patient Position: Sitting   Pulse: 73   SpO2: 90%   Weight: 108 kg (237 lb)   Height: 182.9 cm (72\")     General Appearance:    Alert, cooperative, no distress, " appears stated age   Head:    Normocephalic, without obvious abnormality, atraumatic   Eyes:    conjunctiva/corneas clear   Nose:   Nares normal, septum midline, mucosa normal, no drainage   Throat:   Lips, teeth and gums normal   Neck:   Supple, symmetrical, trachea midline, no carotid    bruit or JVD   Lungs:     Clear to auscultation bilaterally, respirations unlabored   Chest Wall:    No tenderness or deformity    Heart:    Regular rate and rhythm, S1 and S2 normal, no murmur, rub   or gallop, normal carotid impulse bilaterally without bruit.   Abdomen:     Soft, non-tender   Extremities:   Extremities normal, atraumatic, no cyanosis or edema   Pulses:   2+ and symmetric all extremities   Skin:   Skin color, texture, turgor normal, no rashes or lesions       Diagnostic Data:  Procedures  Lab Results   Component Value Date    CHLPL 134 02/08/2018    TRIG 133 02/08/2018    HDL 41 02/08/2018     Lab Results   Component Value Date    GLUCOSE 104 (H) 04/08/2022    BUN 11 04/08/2022    CREATININE 1.75 (H) 04/08/2022     (L) 04/08/2022    K 4.4 04/08/2022     04/08/2022    CO2 24.0 04/08/2022     Lab Results   Component Value Date    HGBA1C 5.90 (H) 09/01/2021     Lab Results   Component Value Date    WBC 10.74 04/08/2022    HGB 13.8 04/08/2022    HCT 42.2 04/08/2022     04/08/2022       Allergies  Allergies   Allergen Reactions   • Codeine      GI UPSET       Current Medications    Current Outpatient Medications:   •  albuterol (PROVENTIL HFA;VENTOLIN HFA) 108 (90 BASE) MCG/ACT inhaler, Inhale 2 puffs Every 4 (Four) Hours As Needed for Wheezing or Shortness of Air., Disp: , Rfl:   •  albuterol (PROVENTIL) (2.5 MG/3ML) 0.083% nebulizer solution, Take 2.5 mg by nebulization Daily As Needed for Wheezing or Shortness of Air., Disp: , Rfl: 2  •  amiodarone (PACERONE) 200 MG tablet, Take 1 tablet by mouth Daily., Disp: 90 tablet, Rfl: 1  •  amitriptyline (ELAVIL) 75 MG tablet, Take  by mouth Every Night.,  Disp: , Rfl:   •  apixaban (ELIQUIS) 5 MG tablet tablet, Take 1 tablet by mouth 2 (Two) Times a Day. (Stop aspirin when starting Eliquis), Disp: 60 tablet, Rfl: 5  •  atorvastatin (LIPITOR) 10 MG tablet, Take 1 tablet by mouth Every Night. Needs lipid panel., Disp: 30 tablet, Rfl: 11  •  Budeson-Glycopyrrol-Formoterol (Breztri Aerosphere) 160-9-4.8 MCG/ACT aerosol inhaler, Inhale 2 puffs 2 (Two) Times a Day., Disp: , Rfl:   •  Daliresp 500 MCG tablet tablet, Take 500 mcg by mouth Daily., Disp: , Rfl:   •  famotidine (PEPCID) 20 MG tablet, Take 1 tablet by mouth At Night As Needed., Disp: , Rfl:   •  furosemide (LASIX) 20 MG tablet, Take 1 tablet by mouth As Needed (edema). (Patient taking differently: Take 20 mg by mouth Daily As Needed (edema).), Disp: 30 tablet, Rfl: 11  •  isosorbide dinitrate (ISORDIL) 20 MG tablet, TAKE 2 TABS DAILY, Disp: 180 tablet, Rfl: 3  •  metoprolol succinate XL (TOPROL-XL) 25 MG 24 hr tablet, Take 1 tablet by mouth Daily., Disp: 90 tablet, Rfl: 3  •  O2 (OXYGEN), Inhale 3-4 L/min Continuous., Disp: , Rfl:   •  tamsulosin (FLOMAX) 0.4 MG capsule 24 hr capsule, Take 1 capsule by mouth Every Night., Disp: , Rfl:           ROS  Review of Systems   Cardiovascular: Positive for dyspnea on exertion, orthopnea and paroxysmal nocturnal dyspnea. Negative for irregular heartbeat.   Respiratory: Positive for cough, shortness of breath, sleep disturbances due to breathing and sputum production. Negative for snoring and wheezing.    Neurological: Positive for dizziness.       SOCIAL HX  Social History     Socioeconomic History   • Marital status:    Tobacco Use   • Smoking status: Former Smoker     Packs/day: 1.00     Types: Cigarettes     Quit date: 2016     Years since quittin.8   • Smokeless tobacco: Never Used   Vaping Use   • Vaping Use: Never used   Substance and Sexual Activity   • Alcohol use: No   • Drug use: No   • Sexual activity: Defer       FAMILY HX  Family History    Problem Relation Age of Onset   • Heart disease Mother    • Alzheimer's disease Mother    • Cancer Father    • Heart attack Sister    • Diabetes Brother    • Lung disease Brother    • Kidney disease Brother    • Colon cancer Brother              Vinayak Felix III, MD, FACC

## 2022-10-07 NOTE — ED PROVIDER NOTES
EMERGENCY DEPARTMENT ENCOUNTER    Pt Name: Morgan Luna  MRN: [id number]  Pt :   1950  Room Number:  KIRA/KIRA  Date of encounter:  10/7/2022  PCP: Risa Morales MD  ED Provider: Gm Lockwood MD    Historian: Patient      HPI:  Chief Complaint: Abdominal pain        Context: Morgan Luna is a 72 y.o. male who presents to the ED c/o epigastric abdominal pain which started around 9 AM after he ate breakfast.  He rates his discomfort 8-9 out of 10 on the pain scale.  He has been vomiting including water.  He denies fevers, chills, cough.  He does not recall having pain similar to this previously.  He denies chest pain and difficulty breathing.  He has not taken any medications for symptom relief prior to coming to the emergency department.  He is on home oxygen at 3 L, baseline.  He has a pacemaker and underwent CABG 26 years ago.      PAST MEDICAL HISTORY  Past Medical History:   Diagnosis Date   • Acid reflux    • Anxiety    • Anxiety    • Black lung disease (HCC)    • COPD (chronic obstructive pulmonary disease) (HCC)    • Current every day smoker    • Hypercholesteremia    • Hypertension    • Ischemic cardiomyopathy 01/15/2015    DR. FERNANDEZ   • Presence of combination internal cardiac defibrillator (ICD) and pacemaker          PAST SURGICAL HISTORY  Past Surgical History:   Procedure Laterality Date   • BACK SURGERY     • CORONARY ARTERY BYPASS GRAFT     • OTHER SURGICAL HISTORY      ICD IMPLANTATION; Dr. Fernandez         FAMILY HISTORY  Family History   Problem Relation Age of Onset   • Heart disease Mother    • Alzheimer's disease Mother    • Cancer Father    • Heart attack Sister    • Diabetes Brother    • Lung disease Brother    • Kidney disease Brother    • Colon cancer Brother          SOCIAL HISTORY  Social History     Socioeconomic History   • Marital status:    Tobacco Use   • Smoking status: Former     Packs/day: 1.00     Types: Cigarettes     Quit date: 2016     Years  since quittin.9   • Smokeless tobacco: Never   Vaping Use   • Vaping Use: Never used   Substance and Sexual Activity   • Alcohol use: No   • Drug use: No   • Sexual activity: Defer         ALLERGIES  Codeine        REVIEW OF SYSTEMS  Review of Systems       All systems reviewed and negative except for those discussed in HPI.       PHYSICAL EXAM    I have reviewed the triage vital signs and nursing notes.    ED Triage Vitals [10/07/22 1235]   Temp Heart Rate Resp BP SpO2   98.4 °F (36.9 °C) 80 20 128/79 92 %      Temp src Heart Rate Source Patient Position BP Location FiO2 (%)   Oral Monitor Sitting Left arm --       Physical Exam  GENERAL:   Appears rather uncomfortable, occasionally groaning lightly as he holds his epigastric region.  HENT: Nares patent.  EYES: No scleral icterus.  CV: Regular rhythm, regular rate.  No murmurs gallops rubs  RESPIRATORY: Normal effort.  No audible wheezes, rales or rhonchi.  Clear to auscultation  ABDOMEN: Soft, tender to palpation in the epigastric region only.  No hepatosplenomegaly or masses.  No guarding rebound or rigidity.  MUSCULOSKELETAL: No deformities.   NEURO: Alert, moves all extremities, follows commands.  SKIN: Warm, dry, no rash visualized.        LAB RESULTS      If labs were ordered, I independently reviewed the results.  No particular note the patient's white blood cell count is 26,000.  His lipase is too high to count.        RADIOLOGY      I ordered and reviewed the above noted radiographic studies.      I viewed images of CT abdomen pelvis which showed inflammatory changes of the pancreas per my independent interpretation.    See radiologist's dictation for official interpretation.        PROCEDURES    Procedures    ECG 12 Lead   Final Result   Test Reason : CP   Blood Pressure :   */*   mmHG   Vent. Rate :  78 BPM     Atrial Rate :  78 BPM      P-R Int :   * ms          QRS Dur : 190 ms       QT Int : 488 ms       P-R-T Axes :  94 -44 108 degrees      QTc  Int : 556 ms      ** Poor data quality, interpretation may be adversely affected   Ventricular-paced rhythm   Biventricular pacemaker detected   Abnormal ECG   When compared with ECG of 08-APR-2022 10:53,   Vent. rate has increased BY   4 BPM   Confirmed by GABRIELA VALLADARES MD ([id number]) on 10/7/2022 8:03:44 PM      Referred By: ED MD           Confirmed By: GABRIELA VALLADARES MD          MEDICATIONS GIVEN IN ER    Medications - No data to display      PROGRESS, DATA ANALYSIS, CONSULTS, AND MEDICAL DECISION MAKING    All labs have been independently reviewed by me.  All radiology studies have been reviewed by me and the radiologist dictating the report.   EKG's have been independently viewed and interpreted by me.      Differential diagnoses: Gallstone pancreatitis is most likely.      ED Course as of 10/08/22 1545   Sat Oct 08, 2022   1532 The patient became tired of waiting in the lobby and left before he was called back to her room.  There was no answer when he was called back to the room.  I called him just now, 10/8/2022 at 1530.  I spoke with him about his laboratory findings as well as his CT scan.  I strongly recommended that he come back to the emergency department immediately in order to be admitted and for further diagnostic testing.  He tells me that he is still quite miserable with recurrent vomiting and continued epigastric abdominal pain.  I again stressed the need to return immediately and he verbalized understanding and states that he will go directly to the emergency department.  He is coming from [place] which is roughly an hour and a half drive per his estimation.  I am notifying the physicians and ANA MARIA's working today/this evening so that we can expedite his admission. [MS]   1541 I have sent an epic chat to all physicians, PAs, nurse practitioners who are working today and overnight in order to expedite this patient's care once he returns. [MS]      ED Course User Index  [MS] Gabriela Valladares MD              AS OF 15:45 EDT VITALS:    BP - 128/79  HR - 80  TEMP - 98.4 °F (36.9 °C) (Oral)  O2 SATS - 92%                  DIAGNOSIS  Final diagnoses:   Acute pancreatitis without infection or necrosis, unspecified pancreatitis type   Calculus of gallbladder with biliary obstruction but without cholecystitis   Leukocytosis, unspecified type   Eloped from emergency department         DISPOSITION  [facility]           Gm Lockwood MD  10/08/22 1959